# Patient Record
Sex: FEMALE | Race: BLACK OR AFRICAN AMERICAN | NOT HISPANIC OR LATINO | Employment: UNEMPLOYED | URBAN - METROPOLITAN AREA
[De-identification: names, ages, dates, MRNs, and addresses within clinical notes are randomized per-mention and may not be internally consistent; named-entity substitution may affect disease eponyms.]

---

## 2017-02-08 ENCOUNTER — HOSPITAL ENCOUNTER (EMERGENCY)
Facility: HOSPITAL | Age: 27
Discharge: HOME | End: 2017-02-08
Attending: EMERGENCY MEDICINE
Payer: MEDICAID

## 2017-02-08 VITALS
TEMPERATURE: 99 F | HEIGHT: 67 IN | OXYGEN SATURATION: 97 % | SYSTOLIC BLOOD PRESSURE: 141 MMHG | RESPIRATION RATE: 18 BRPM | WEIGHT: 220 LBS | HEART RATE: 64 BPM | DIASTOLIC BLOOD PRESSURE: 83 MMHG | BODY MASS INDEX: 34.53 KG/M2

## 2017-02-08 DIAGNOSIS — M23.8X1 DERANGEMENT OF RIGHT KNEE LIGAMENT: Primary | ICD-10-CM

## 2017-02-08 DIAGNOSIS — S89.90XA KNEE INJURY: ICD-10-CM

## 2017-02-08 DIAGNOSIS — M25.461 EFFUSION OF RIGHT KNEE: ICD-10-CM

## 2017-02-08 LAB
B-HCG UR QL: NEGATIVE
CTP QC/QA: YES

## 2017-02-08 PROCEDURE — 63600175 PHARM REV CODE 636 W HCPCS: Performed by: EMERGENCY MEDICINE

## 2017-02-08 PROCEDURE — 29505 APPLICATION LONG LEG SPLINT: CPT | Mod: RT

## 2017-02-08 PROCEDURE — 99284 EMERGENCY DEPT VISIT MOD MDM: CPT | Mod: 25

## 2017-02-08 PROCEDURE — 81025 URINE PREGNANCY TEST: CPT | Performed by: EMERGENCY MEDICINE

## 2017-02-08 PROCEDURE — 96372 THER/PROPH/DIAG INJ SC/IM: CPT

## 2017-02-08 RX ORDER — KETOROLAC TROMETHAMINE 30 MG/ML
30 INJECTION, SOLUTION INTRAMUSCULAR; INTRAVENOUS
Status: COMPLETED | OUTPATIENT
Start: 2017-02-08 | End: 2017-02-08

## 2017-02-08 RX ORDER — TRAMADOL HYDROCHLORIDE 50 MG/1
50 TABLET ORAL EVERY 6 HOURS PRN
Qty: 12 TABLET | Refills: 0 | Status: SHIPPED | OUTPATIENT
Start: 2017-02-08 | End: 2017-02-18

## 2017-02-08 RX ORDER — IBUPROFEN 600 MG/1
600 TABLET ORAL EVERY 6 HOURS PRN
Qty: 20 TABLET | Refills: 0 | Status: SHIPPED | OUTPATIENT
Start: 2017-02-08 | End: 2021-09-30

## 2017-02-08 RX ADMIN — KETOROLAC TROMETHAMINE 30 MG: 30 INJECTION, SOLUTION INTRAMUSCULAR at 10:02

## 2017-02-08 NOTE — ED AVS SNAPSHOT
[facility]  [address] [place] [address]               Ole Allen   2017  8:07 PM   ED    Description:  Female : 1990   Department:  [facility]           Your Care was Coordinated By:     Provider Role From To    Bonnie Riggs MD Attending Provider 17 --      Reason for Visit     Knee Pain           Diagnoses this Visit        Comments    Derangement of right knee ligament    -  Primary     Knee injury         Effusion of right knee           ED Disposition     ED Disposition Condition Comment    Discharge             To Do List           Follow-up Information     Follow up with Umesh He MD In 2 days.    Specialty:  Family Medicine    Why:  call to make an appointment to be seen for referral for orthopedic specialist.    Contact information:    [address]  [place] [address]  [phone]          Follow up with Jacky Welsh MD In 2 days.    Specialty:  Orthopedic Surgery    Why:  for further evaluation and definitive management    Contact information:    [address]  [address]  [place] [address]  [phone]         These Medications        Disp Refills Start End    ibuprofen (ADVIL,MOTRIN) 600 MG tablet 20 tablet 0 2017     Take 1 tablet (600 mg total) by mouth every 6 (six) hours as needed for Pain. - Oral    Pharmacy: [facility] - [place] [facility] Ph #: [phone]       tramadol (ULTRAM) 50 mg tablet 12 tablet 0 2017    Take 1 tablet (50 mg total) by mouth every 6 (six) hours as needed. - Oral    Pharmacy: [facility] - [place] [facility] Ph #: [phone]         [facility] On Call     [facility] On Call Nurse Care Line -  Assistance  Registered nurses in the [facility] provide clinical advisement, health education, appointment booking, and other advisory services.  Call for  "this free service at [phone].             Medications           Message regarding Medications     Verify the changes and/or additions to your medication regime listed below are the same as discussed with your clinician today.  If any of these changes or additions are incorrect, please notify your healthcare provider.        START taking these NEW medications        Refills    ibuprofen (ADVIL,MOTRIN) 600 MG tablet 0    Sig: Take 1 tablet (600 mg total) by mouth every 6 (six) hours as needed for Pain.    Class: Print    Route: Oral    tramadol (ULTRAM) 50 mg tablet 0    Sig: Take 1 tablet (50 mg total) by mouth every 6 (six) hours as needed.    Class: Print    Route: Oral      These medications were administered today        Dose Freq    ketorolac injection 30 mg 30 mg ED 1 Time    Sig: Inject 30 mg into the muscle ED 1 Time.    Class: Normal    Route: Intramuscular           Verify that the below list of medications is an accurate representation of the medications you are currently taking.  If none reported, the list may be blank. If incorrect, please contact your healthcare provider. Carry this list with you in case of emergency.           Current Medications     LEVONORGESTREL-ETHIN ESTRADIOL (LEVORA 0.15/30, 28, ORAL) Take by mouth.    albuterol sulfate 2.5 mg/0.5 mL Nebu Take 2.5 mg by nebulization every 4 (four) hours as needed.    ibuprofen (ADVIL,MOTRIN) 600 MG tablet Take 1 tablet (600 mg total) by mouth every 6 (six) hours as needed for Pain.    ketorolac injection 30 mg Inject 30 mg into the muscle ED 1 Time.    tramadol (ULTRAM) 50 mg tablet Take 1 tablet (50 mg total) by mouth every 6 (six) hours as needed.           Clinical Reference Information           Your Vitals Were     BP Pulse Temp Resp Height Weight    141/83 64 98.9 °F (37.2 °C) 18 5' 7" (1.702 m) 99.8 kg (220 lb)    SpO2 BMI             97% 34.46 kg/m2         Allergies as of 2/8/2017     No Known Allergies      Immunizations " Administered on Date of Encounter - 2/8/2017     None      ED Micro, Lab, POCT     Start Ordered       Status Ordering Provider    02/08/17 2053 02/08/17 2052  POCT urine pregnancy  Once      Final result       ED Imaging Orders     Start Ordered       Status Ordering Provider    02/08/17 2053 02/08/17 2052  X-Ray Knee 3 View Right  1 time imaging      Final result     02/08/17 2053 02/08/17 2052  X-Ray Tibia Fibula 2 View Right  1 time imaging      Final result         Discharge Instructions       Follow-up with orthopedic specialist for further evaluation and definitive management.  You may require further evaluation with MRI to assess for possible ligament injury.  Keep knee immobilizer in place until seen and cleared by orthopedic specialist.    Discharge References/Attachments     KNEE SPRAIN (ENGLISH)    KNEE LIGAMENT INJURY, ACL AND PCL (ENGLISH)    KNEE EFFUSION (ENGLISH)      [facility] Sign-Up     Activating your [facility] account is as easy as 1-2-3!     1) Visit [email or web address], select Sign Up Now, enter this activation code and your date of birth, then select Next.  Activation code not generated  Current Patient Portal Status: Account disabled      2) Create a username and password to use when you visit [facility] in the future and select a security question in case you lose your password and select Next.    3) Enter your e-mail address and click Sign Up!    Additional Information  If you have questions, please e-mail [email or web address] or call [phone] to talk to our [facility] staff. Remember, [facility] is NOT to be used for urgent needs. For medical emergencies, dial 911.          [facility] complies with applicable Federal civil rights laws and does not discriminate on the basis of race, color, national origin, age, disability, or sex.        Language Assistance Services     ATTENTION: Language assistance services are available, free of charge. Please call [phone].       ATENCIÓN: Si habla español, tiene a [place] disposición servicios gratuitos de asistencia lingüística. Llame al [phone].     CHÚ Ý: N?u b?n nói Ti?ng Vi?t, có các d?ch v? h? tr? ngôn ng? mi?n phí dành cho b?n. G?i s? [phone].

## 2017-02-09 NOTE — DISCHARGE INSTRUCTIONS
Follow-up with orthopedic specialist for further evaluation and definitive management.  You may require further evaluation with MRI to assess for possible ligament injury.  Keep knee immobilizer in place until seen and cleared by orthopedic specialist.

## 2017-02-09 NOTE — ED TRIAGE NOTES
Pt complains of Right knee pain.  Pt injured knee during a basketball layup when she came down on the right leg and heard a pop, felt a tear and pain along out knee area.  Pt also complains of pain all around the knee. Pt has taken ibuprophen last night for pain but has not taken anything today.   R knee is swollen and warm to touch.

## 2017-02-09 NOTE — ED PROVIDER NOTES
"Encounter Date: 2017    SCRIBE #1 NOTE: I, Kiki Pandya, am scribing for, and in the presence of,  Bonnie Riggs MD. I have scribed the following portions of the note - Other sections scribed: HPI, ROS.       History     Chief Complaint   Patient presents with    Knee Pain     " I was playing basket ball and I fell, landing on my right knee on Friday. I tried to see if it would get better on its own but it hasnt. It is swollen and hard to put pressure on."     Review of patient's allergies indicates:  No Known Allergies  HPI Comments: CC: Knee Pain    HPI: 27 year old female with asthma and herpes presents to the ED c/o acute, moderate (6/10) right knee pain with associated swelling. Patient reports playing basketball 6 days ago and heard a pop in her right knee when landing after a layup. Patient states the knee pain is exacerbated with movement, and is unable to bear weight on the right leg. No reported prior treatment. Patient denies fever, nausea, and other symptoms.     The history is provided by the patient. No  was used.     Past Medical History   Diagnosis Date    Asthma     Herpes      No past medical history pertinent negatives.  Past Surgical History   Procedure Laterality Date     section      Anterior cruciate ligament repair       History reviewed. No pertinent family history.  Social History   Substance Use Topics    Smoking status: Never Smoker    Smokeless tobacco: Never Used    Alcohol use Yes      Comment: occ     Review of Systems   Constitutional: Negative for fever.   HENT: Negative for rhinorrhea.    Eyes: Negative for pain.   Respiratory: Negative for shortness of breath.    Cardiovascular: Negative for chest pain.   Gastrointestinal: Negative for diarrhea, nausea and vomiting.   Genitourinary: Negative for dysuria.   Musculoskeletal:        (+) Right knee pain  (+) Right knee swelling   Skin: Negative for rash.   Neurological: Negative " for headaches.       Physical Exam   Initial Vitals   BP Pulse Resp Temp SpO2   02/08/17 1747 02/08/17 1747 02/08/17 1747 02/08/17 1747 02/08/17 1747   117/64 62 18 98.9 °F (37.2 °C) 95 %     Physical Exam    Nursing note and vitals reviewed.  Constitutional: She appears well-developed and well-nourished.   HENT:   Head: Normocephalic and atraumatic.   Eyes: Conjunctivae and EOM are normal.   Neck: Neck supple.   Cardiovascular: Regular rhythm and normal heart sounds. Exam reveals no gallop and no friction rub.    No murmur heard.  Pulmonary/Chest: Breath sounds normal. No respiratory distress. She has no wheezes. She has no rhonchi. She has no rales.   Abdominal: Soft. Bowel sounds are normal. There is no tenderness. There is no rebound and no guarding.   Musculoskeletal: Normal range of motion. She exhibits tenderness.   Pain with palpation to right lateral knee and right proximal anterior tibia. No obvious ligamentous laxity. 2+ DP pulse. No long bone deformity.    Neurological: She is alert and oriented to person, place, and time. No cranial nerve deficit.   Skin: No rash noted.         ED Course   Procedures  Labs Reviewed   POCT URINE PREGNANCY             Medical Decision Making:   History:   Old Medical Records: I decided to obtain old medical records.  27 y.o. female presents with right knee pain, after injuring it while playing basketball approximately 6 days ago.  She reports hearing a pop when landing on the affected leg, after doing a layup while playing basketball.  Differential diagnosis includes but not limited to acute fracture, ligament injury, sprain, septic arthritis, among others.  On exam patient has full range of motion of right knee.  She does have point tenderness to right lateral knee, and right proximal anterior tib-fib.  Right knee x-rays independently interpreted by me show no evidence of acute fracture however there is lateral displacement of patella and evidence of knee effusion.   Doubt septic joint.  I'm concerned for ligament injury.  Patient placed in knee immobilizer and I will refer her to orthopedic specialist.  I discussed at length with the patient the importance of close follow-up up with orthopedic specialist for MRI.  She states understanding discharge plan and is comfortable going home.            Scribe Attestation:   Scribe #1: I performed the above scribed service and the documentation accurately describes the services I performed. I attest to the accuracy of the note.    Attending Attestation:           Physician Attestation for Scribe:  Physician Attestation Statement for Scribe #1: I, Bonnie Riggs MD, reviewed documentation, as scribed by Kiki Pandya in my presence, and it is both accurate and complete.                 ED Course     Clinical Impression:   The primary encounter diagnosis was Derangement of right knee ligament. Diagnoses of Knee injury and Effusion of right knee were also pertinent to this visit.          Bonnie Riggs MD  02/08/17 1809

## 2017-11-28 ENCOUNTER — HOSPITAL ENCOUNTER (EMERGENCY)
Facility: HOSPITAL | Age: 27
Discharge: HOME | End: 2017-11-29
Attending: EMERGENCY MEDICINE
Payer: MEDICAID

## 2017-11-28 DIAGNOSIS — Z33.1 PREGNANCY AS INCIDENTAL FINDING: ICD-10-CM

## 2017-11-28 DIAGNOSIS — R11.2 NAUSEA AND VOMITING, INTRACTABILITY OF VOMITING NOT SPECIFIED, UNSPECIFIED VOMITING TYPE: ICD-10-CM

## 2017-11-28 DIAGNOSIS — N76.0 BV (BACTERIAL VAGINOSIS): ICD-10-CM

## 2017-11-28 DIAGNOSIS — B96.89 BV (BACTERIAL VAGINOSIS): ICD-10-CM

## 2017-11-28 DIAGNOSIS — O30.041 DICHORIONIC DIAMNIOTIC TWIN PREGNANCY IN FIRST TRIMESTER: Primary | ICD-10-CM

## 2017-11-28 LAB
ABO + RH BLD: NORMAL
ALBUMIN SERPL BCP-MCNC: 3.8 G/DL
ALP SERPL-CCNC: 71 U/L
ALT SERPL W/O P-5'-P-CCNC: 26 U/L
ANION GAP SERPL CALC-SCNC: 11 MMOL/L
AST SERPL-CCNC: 23 U/L
B-HCG UR QL: POSITIVE
BACTERIA GENITAL QL WET PREP: ABNORMAL
BASOPHILS # BLD AUTO: 0.02 K/UL
BASOPHILS NFR BLD: 0.3 %
BILIRUB SERPL-MCNC: 0.9 MG/DL
BILIRUB UR QL STRIP: NEGATIVE
BUN SERPL-MCNC: 10 MG/DL
CALCIUM SERPL-MCNC: 9.9 MG/DL
CHLORIDE SERPL-SCNC: 106 MMOL/L
CLARITY UR: CLEAR
CLUE CELLS VAG QL WET PREP: ABNORMAL
CO2 SERPL-SCNC: 20 MMOL/L
COLOR UR: YELLOW
CREAT SERPL-MCNC: 0.8 MG/DL
CTP QC/QA: YES
DIFFERENTIAL METHOD: ABNORMAL
EOSINOPHIL # BLD AUTO: 0 K/UL
EOSINOPHIL NFR BLD: 0.1 %
ERYTHROCYTE [DISTWIDTH] IN BLOOD BY AUTOMATED COUNT: 13.5 %
EST. GFR  (AFRICAN AMERICAN): >60 ML/MIN/1.73 M^2
EST. GFR  (NON AFRICAN AMERICAN): >60 ML/MIN/1.73 M^2
FILAMENT FUNGI VAG WET PREP-#/AREA: ABNORMAL
GLUCOSE SERPL-MCNC: 111 MG/DL
GLUCOSE UR QL STRIP: NEGATIVE
HCG INTACT+B SERPL-ACNC: NORMAL MIU/ML
HCT VFR BLD AUTO: 34.4 %
HGB BLD-MCNC: 12 G/DL
HGB UR QL STRIP: NEGATIVE
KETONES UR QL STRIP: ABNORMAL
LEUKOCYTE ESTERASE UR QL STRIP: NEGATIVE
LIPASE SERPL-CCNC: 4 U/L
LYMPHOCYTES # BLD AUTO: 1 K/UL
LYMPHOCYTES NFR BLD: 14.4 %
MCH RBC QN AUTO: 31 PG
MCHC RBC AUTO-ENTMCNC: 34.9 G/DL
MCV RBC AUTO: 89 FL
MONOCYTES # BLD AUTO: 0.1 K/UL
MONOCYTES NFR BLD: 1.5 %
NEUTROPHILS # BLD AUTO: 5.7 K/UL
NEUTROPHILS NFR BLD: 83.7 %
NITRITE UR QL STRIP: NEGATIVE
PH UR STRIP: 7 [PH] (ref 5–8)
PLATELET # BLD AUTO: 232 K/UL
PMV BLD AUTO: 10.7 FL
POTASSIUM SERPL-SCNC: 3.6 MMOL/L
PROT SERPL-MCNC: 7.9 G/DL
PROT UR QL STRIP: NEGATIVE
RBC # BLD AUTO: 3.87 M/UL
SODIUM SERPL-SCNC: 137 MMOL/L
SP GR UR STRIP: 1.02 (ref 1–1.03)
SPECIMEN SOURCE: ABNORMAL
T VAGINALIS GENITAL QL WET PREP: ABNORMAL
URN SPEC COLLECT METH UR: ABNORMAL
UROBILINOGEN UR STRIP-ACNC: NEGATIVE EU/DL
WBC # BLD AUTO: 6.75 K/UL
WBC #/AREA VAG WET PREP: ABNORMAL
YEAST GENITAL QL WET PREP: ABNORMAL

## 2017-11-28 PROCEDURE — 86901 BLOOD TYPING SEROLOGIC RH(D): CPT | Mod: 91

## 2017-11-28 PROCEDURE — 84702 CHORIONIC GONADOTROPIN TEST: CPT

## 2017-11-28 PROCEDURE — 87210 SMEAR WET MOUNT SALINE/INK: CPT

## 2017-11-28 PROCEDURE — 83690 ASSAY OF LIPASE: CPT

## 2017-11-28 PROCEDURE — 63600175 PHARM REV CODE 636 W HCPCS: Performed by: NURSE PRACTITIONER

## 2017-11-28 PROCEDURE — 80053 COMPREHEN METABOLIC PANEL: CPT

## 2017-11-28 PROCEDURE — 81025 URINE PREGNANCY TEST: CPT | Performed by: EMERGENCY MEDICINE

## 2017-11-28 PROCEDURE — 96374 THER/PROPH/DIAG INJ IV PUSH: CPT

## 2017-11-28 PROCEDURE — 81003 URINALYSIS AUTO W/O SCOPE: CPT

## 2017-11-28 PROCEDURE — 86900 BLOOD TYPING SEROLOGIC ABO: CPT

## 2017-11-28 PROCEDURE — 85025 COMPLETE CBC W/AUTO DIFF WBC: CPT

## 2017-11-28 PROCEDURE — 99284 EMERGENCY DEPT VISIT MOD MDM: CPT | Mod: 25

## 2017-11-28 PROCEDURE — 25000003 PHARM REV CODE 250: Performed by: NURSE PRACTITIONER

## 2017-11-28 PROCEDURE — 87591 N.GONORRHOEAE DNA AMP PROB: CPT

## 2017-11-28 PROCEDURE — 86901 BLOOD TYPING SEROLOGIC RH(D): CPT

## 2017-11-28 PROCEDURE — 96361 HYDRATE IV INFUSION ADD-ON: CPT

## 2017-11-28 RX ORDER — ONDANSETRON 2 MG/ML
4 INJECTION INTRAMUSCULAR; INTRAVENOUS
Status: COMPLETED | OUTPATIENT
Start: 2017-11-28 | End: 2017-11-28

## 2017-11-28 RX ORDER — ONDANSETRON 2 MG/ML
4 INJECTION INTRAMUSCULAR; INTRAVENOUS
Status: DISCONTINUED | OUTPATIENT
Start: 2017-11-28 | End: 2017-11-28

## 2017-11-28 RX ORDER — ONDANSETRON 4 MG/1
4 TABLET, FILM COATED ORAL EVERY 6 HOURS PRN
Qty: 10 TABLET | Refills: 0 | Status: SHIPPED | OUTPATIENT
Start: 2017-11-28 | End: 2022-10-24

## 2017-11-28 RX ORDER — ONDANSETRON 4 MG/1
4 TABLET, ORALLY DISINTEGRATING ORAL
Status: COMPLETED | OUTPATIENT
Start: 2017-11-28 | End: 2017-11-28

## 2017-11-28 RX ADMIN — ONDANSETRON 4 MG: 2 INJECTION INTRAMUSCULAR; INTRAVENOUS at 09:11

## 2017-11-28 RX ADMIN — SODIUM CHLORIDE 1000 ML: 0.9 INJECTION, SOLUTION INTRAVENOUS at 08:11

## 2017-11-28 RX ADMIN — ONDANSETRON 4 MG: 4 TABLET, ORALLY DISINTEGRATING ORAL at 08:11

## 2017-11-29 VITALS
HEART RATE: 60 BPM | OXYGEN SATURATION: 95 % | WEIGHT: 205 LBS | SYSTOLIC BLOOD PRESSURE: 140 MMHG | DIASTOLIC BLOOD PRESSURE: 76 MMHG | TEMPERATURE: 98 F | RESPIRATION RATE: 18 BRPM | BODY MASS INDEX: 32.18 KG/M2 | HEIGHT: 67 IN

## 2017-11-29 LAB
C TRACH DNA SPEC QL NAA+PROBE: NOT DETECTED
N GONORRHOEA DNA SPEC QL NAA+PROBE: NOT DETECTED
RH BLD: NORMAL

## 2017-11-29 NOTE — DISCHARGE INSTRUCTIONS
You are 6 weeks 5 days pregnant with twins.  Estimated delivery date 7/19/2018.    You have been prescribed prenatal vitamins.  Take these as prescribed. You have also been prescribed Zofran, and anti-nausea medication.  Take this medication as prescribed as needed for nausea.  Eat a bland diet and drink plenty of fluids.    Call your OB/GYN tomorrow morning to schedule an appointment immediately to establish care.  Your blood type is O negative. This will be important later on in your pregnancy.  You also have bacterial vaginosis. We will defer this treatment to the OB-GYN doctor. Notify your OB-GYN on your first visit.    Please return to the Emergency Department for any new or worsening symptoms including: Worsening abdominal pain, continued vomiting, vaginal bleeding or pelvic pain, fever, chest pain, shortness of breath, loss of consciousness, dizziness, weakness, or any other concerns.     Please follow up with your Primary Care Provider within in the week. If you do not have one, you may contact the one listed on your discharge paperwork or you may also call the [facility] Appointment Desk at [phone] to schedule an appointment with one.     Please take all medication as prescribed.

## 2017-11-29 NOTE — ED PROVIDER NOTES
Encounter Date: 2017       History     Chief Complaint   Patient presents with    Abdominal Pain     Vomiting since this morning x 10.  Chills and abdominal pain.     CC: Abdominal pain    HPI: The patient is a 27-year-old female who presents today with generalized abdominal pain, nausea, vomiting since this morning.  She describes the pain as cramping, it is constant, rated 9 out of 10 on pain scale.  She reports vomiting many times today and is currently nauseated.  She attempted to take Pepto-Bismol for this problem, but she vomited it up. Her last menstrual cycle was reported as earlier this month.  UPT today here in the ED is positive.  She does report having unprotected sex.  She denies fever, chills, shortness of breath, chest pain, diarrhea, vaginal bleeding, vaginal discharge, dysuria, urinary urgency or frequency.       The history is provided by the patient. No  was used.     Review of patient's allergies indicates:  No Known Allergies  Past Medical History:   Diagnosis Date    Asthma     Herpes      Past Surgical History:   Procedure Laterality Date    ANTERIOR CRUCIATE LIGAMENT REPAIR       SECTION       History reviewed. No pertinent family history.  Social History   Substance Use Topics    Smoking status: Never Smoker    Smokeless tobacco: Never Used    Alcohol use Yes      Comment: occ     Review of Systems   Constitutional: Negative for chills and fever.   HENT: Negative for sore throat.    Respiratory: Negative for cough, chest tightness and shortness of breath.    Cardiovascular: Negative for chest pain.   Gastrointestinal: Positive for abdominal pain, nausea and vomiting. Negative for constipation and diarrhea.   Genitourinary: Negative for decreased urine volume, difficulty urinating, dysuria, vaginal bleeding, vaginal discharge and vaginal pain.   Musculoskeletal: Negative for back pain.   Skin: Negative for rash.   Neurological: Negative for dizziness,  weakness, light-headedness and headaches.   Hematological: Does not bruise/bleed easily.       Physical Exam     Initial Vitals [11/28/17 1725]   BP Pulse Resp Temp SpO2   (!) 154/79 63 18 97.6 °F (36.4 °C) 95 %      MAP       104         Physical Exam    Constitutional: She appears well-developed and well-nourished. She is not diaphoretic. She is Obese . She has a sickly appearance. No distress.   HENT:   Head: Normocephalic and atraumatic.   Neck: Normal range of motion.   Cardiovascular: Normal rate, regular rhythm and normal heart sounds. Exam reveals no gallop and no friction rub.    No murmur heard.  Pulmonary/Chest: Breath sounds normal. No respiratory distress. She has no wheezes. She has no rhonchi. She has no rales.   Abdominal: Soft. Bowel sounds are normal. She exhibits no distension and no mass. There is no hepatosplenomegaly. There is no tenderness. There is no rigidity, no rebound, no guarding, no CVA tenderness, no tenderness at McBurney's point and negative Suazo's sign.   Genitourinary: Vagina normal. Pelvic exam was performed with patient supine. There is no rash, tenderness, lesion or injury on the right labia. There is no rash, tenderness, lesion or injury on the left labia. Cervix exhibits no motion tenderness, no discharge (small amount mucoid clear/white drainage) and no friability. Right adnexum displays no mass, no tenderness and no fullness. Left adnexum displays no mass, no tenderness and no fullness. No erythema, tenderness or bleeding in the vagina. No foreign body in the vagina. No signs of injury around the vagina. No vaginal discharge found.   Musculoskeletal: Normal range of motion.   Neurological: She is alert and oriented to person, place, and time.   Skin: Skin is warm and dry.   Psychiatric: She has a normal mood and affect. Her behavior is normal.         ED Course   Procedures  Labs Reviewed   CBC W/ AUTO DIFFERENTIAL - Abnormal; Notable for the following:        Result Value     RBC 3.87 (*)     Hematocrit 34.4 (*)     Mono # 0.1 (*)     Gran% 83.7 (*)     Lymph% 14.4 (*)     Mono% 1.5 (*)     All other components within normal limits    Narrative:     For upper or mid abdominal pain.   COMPREHENSIVE METABOLIC PANEL - Abnormal; Notable for the following:     CO2 20 (*)     Glucose 111 (*)     All other components within normal limits    Narrative:     For upper or mid abdominal pain.   URINALYSIS - Abnormal; Notable for the following:     Ketones, UA 1+ (*)     All other components within normal limits   POCT URINE PREGNANCY - Abnormal; Notable for the following:     POC Preg Test, Ur Positive (*)     All other components within normal limits   C. TRACHOMATIS/N. GONORRHOEAE BY AMP DNA   LIPASE    Narrative:     For upper or mid abdominal pain.   HCG, QUANTITATIVE, PREGNANCY   VAGINAL SCREEN   GROUP & RH             Medical Decision Making:   ED Management:  This is an evaluation of a 27 y.o. female that that presents to the Emergency Department for Abdominal Pain, nausea and vomiting. UPT was positive on arrival to the ED.  Last menstrual period earlier this month.  She was unaware she was pregnant. She has not received prenatal care. ROS positive for: Generalized abdominal cramping pain, nausea, vomiting; ROS negative for chills, diarrhea, shortness breath, chest pain, vaginal bleeding, vaginal discharge, dysuria. The patient is a non-toxic, afebrile, and well appearing female. On examination, the abdomen is flat without distention.  There is no surface trauma, scars, incisions.  Bowel sounds are present in all 4 quadrants.  No tenderness, guarding, rigidity to palpation.  No tenderness, masses, pulsation in epigastric area.  No organomegaly.  Negative Suazo sign.  No periumbilical tenderness.  No rebound in the lower quadrants.  Nontender over McBurney's point.  No suprapubic tenderness or distention.  Good femoral pulses bilaterally.  No CVA tenderness. pelvic examination revealed a  closed cervical os with small amount of white mucoid drainage, no CMT, no adnexal fullness or tenderness with palpation. She has no bloody vaginal discharge.     Vital Signs are reassuring.    RESULTS:   The chemistry was negative for hypo-or hyper natremia, kalemia, chloridemia, or other electrolyte abnormalities; BUN and creatinine were within normal limits indicating normal kidney function, ALT and AST were within normal limits indicating normal liver function, there was no transaminitis. Lipase within normal limits indicating normal pancreatic function.  CBC was negativefor leukocytosis  indicating unlikely systemic infection, the H&H was stable and was within normal limits and indicating absence of anemia. The platelet count was normal indicating the absence of a tendency toward bleeding.  UA is negative for infection, no nitrites, leukocytes, blood, or protein present.  There were clue cells and moderate amount of bacteria present on vaginal screen. She reports history of BV. She is currently asymptomatic at this time.  I will defer to OB/GYN.      A transvaginal ultrasound was obtained showing a dichorionic diamniotic intrauterine gestation.  Baby A corresponding to 6 weeks and 5 days' gestation.  Fetal heart rate 124 bpm.  Baby B corresponding to 6 weeks and 3 days gestation.  Fetal heart rate of 128 bpm.  Beta HCG 88781, correlates with transvaginal US findings.  Her blood type is O Negative.  She is not exhibiting any vaginal bleeding or signs of miscarriage at this time.  Therefore I do not feel RhoGAM administration appropriate at this time.  This was discussed with the patient.    Given the above findings, my overall impression is pregnancy, nausea, vomiting, BV. Given the above findings, I do not think the patient has ectopic pregnancy, ovarian torsion, tubo-ovarian abscess, bowel obstruction, appendicitis, cholecystitis, gastroenteritis, diverticulitis, colitis, UTI.    During her stay in the ED, the  patient has been given IVF and zofran with good relief of her symptoms. The patient will be discharged home with short-term course of zofran and prenatal vitamins. Additional home care recommendations include bland diet. The diagnosis, treatment plan, instructions for follow-up and reevaluation with her discharging her with follow up to her OB/GYN for further evaluation of her symptoms. She can take tylenol for pain. ED return precautions have been discussed with the patient and she has verbalized an understanding of the information. All questions or concerns from the patient have been addressed.     This case was discussed with Dr. Ulloa who is in agreement with my assessment and plan.                   ED Course      Clinical Impression:   The primary encounter diagnosis was Dichorionic diamniotic twin pregnancy in first trimester. Diagnoses of Pregnancy as incidental finding, Nausea and vomiting, intractability of vomiting not specified, unspecified vomiting type, and BV (bacterial vaginosis) were also pertinent to this visit.    Disposition:   Disposition: Discharged  Condition: Stable                        Joann Hauser NP  11/29/17 0019

## 2017-11-29 NOTE — ED TRIAGE NOTES
Pt presents to ED c/o abdominal pain, nausea/vomiting, chills and generalized body aches that started this morning.

## 2017-12-14 PROBLEM — Z98.891 H/O CESAREAN SECTION: Status: ACTIVE | Noted: 2017-12-14

## 2017-12-14 PROBLEM — A60.9 HSV (HERPES SIMPLEX VIRUS) ANOGENITAL INFECTION: Status: ACTIVE | Noted: 2017-12-14

## 2017-12-14 PROBLEM — J45.909 ASTHMA: Status: ACTIVE | Noted: 2017-12-14

## 2017-12-18 PROBLEM — D57.3 SICKLE CELL TRAIT: Status: ACTIVE | Noted: 2017-12-18

## 2021-05-10 ENCOUNTER — LAB VISIT (OUTPATIENT)
Dept: LAB | Facility: HOSPITAL | Age: 31
End: 2021-05-10
Attending: OBSTETRICS & GYNECOLOGY
Payer: MEDICAID

## 2021-05-10 ENCOUNTER — OFFICE VISIT (OUTPATIENT)
Dept: OBSTETRICS AND GYNECOLOGY | Facility: CLINIC | Age: 31
End: 2021-05-10
Payer: MEDICAID

## 2021-05-10 VITALS
BODY MASS INDEX: 36.43 KG/M2 | WEIGHT: 232.56 LBS | SYSTOLIC BLOOD PRESSURE: 114 MMHG | DIASTOLIC BLOOD PRESSURE: 74 MMHG

## 2021-05-10 DIAGNOSIS — O34.219 HISTORY OF CESAREAN DELIVERY AFFECTING PREGNANCY: ICD-10-CM

## 2021-05-10 DIAGNOSIS — O09.299 HISTORY OF PRE-ECLAMPSIA IN PRIOR PREGNANCY, CURRENTLY PREGNANT: Primary | ICD-10-CM

## 2021-05-10 DIAGNOSIS — O09.299 HISTORY OF PRE-ECLAMPSIA IN PRIOR PREGNANCY, CURRENTLY PREGNANT: ICD-10-CM

## 2021-05-10 DIAGNOSIS — Z12.4 ENCOUNTER FOR PAPANICOLAOU SMEAR FOR CERVICAL CANCER SCREENING: ICD-10-CM

## 2021-05-10 DIAGNOSIS — Z11.3 SCREEN FOR STD (SEXUALLY TRANSMITTED DISEASE): ICD-10-CM

## 2021-05-10 LAB
ABO + RH BLD: NORMAL
BILIRUB UR QL STRIP: NEGATIVE
BLD GP AB SCN CELLS X3 SERPL QL: NORMAL
CLARITY UR: CLEAR
COLOR UR: YELLOW
ERYTHROCYTE [DISTWIDTH] IN BLOOD BY AUTOMATED COUNT: 14.5 % (ref 11.5–14.5)
GLUCOSE UR QL STRIP: NEGATIVE
HCT VFR BLD AUTO: 29.8 % (ref 37–48.5)
HGB BLD-MCNC: 10.2 G/DL (ref 12–16)
HGB UR QL STRIP: NEGATIVE
KETONES UR QL STRIP: NEGATIVE
LEUKOCYTE ESTERASE UR QL STRIP: NEGATIVE
MCH RBC QN AUTO: 28.8 PG (ref 27–31)
MCHC RBC AUTO-ENTMCNC: 34.2 G/DL (ref 32–36)
MCV RBC AUTO: 84 FL (ref 82–98)
NITRITE UR QL STRIP: NEGATIVE
PH UR STRIP: 7 [PH] (ref 5–8)
PLATELET # BLD AUTO: 271 K/UL (ref 150–450)
PMV BLD AUTO: 10.8 FL (ref 9.2–12.9)
PROT UR QL STRIP: NEGATIVE
RBC # BLD AUTO: 3.54 M/UL (ref 4–5.4)
SP GR UR STRIP: 1.01 (ref 1–1.03)
URN SPEC COLLECT METH UR: NORMAL
UROBILINOGEN UR STRIP-ACNC: NEGATIVE EU/DL
WBC # BLD AUTO: 5.13 K/UL (ref 3.9–12.7)

## 2021-05-10 PROCEDURE — 85027 COMPLETE CBC AUTOMATED: CPT | Performed by: OBSTETRICS & GYNECOLOGY

## 2021-05-10 PROCEDURE — 83021 HEMOGLOBIN CHROMOTOGRAPHY: CPT | Performed by: OBSTETRICS & GYNECOLOGY

## 2021-05-10 PROCEDURE — 81003 URINALYSIS AUTO W/O SCOPE: CPT | Performed by: OBSTETRICS & GYNECOLOGY

## 2021-05-10 PROCEDURE — 87491 CHLMYD TRACH DNA AMP PROBE: CPT | Mod: 59 | Performed by: OBSTETRICS & GYNECOLOGY

## 2021-05-10 PROCEDURE — 87624 HPV HI-RISK TYP POOLED RSLT: CPT | Performed by: OBSTETRICS & GYNECOLOGY

## 2021-05-10 PROCEDURE — 80307 DRUG TEST PRSMV CHEM ANLYZR: CPT | Performed by: OBSTETRICS & GYNECOLOGY

## 2021-05-10 PROCEDURE — 87481 CANDIDA DNA AMP PROBE: CPT | Mod: 59 | Performed by: OBSTETRICS & GYNECOLOGY

## 2021-05-10 PROCEDURE — 99203 OFFICE O/P NEW LOW 30 MIN: CPT | Mod: PBBFAC,TH,25 | Performed by: OBSTETRICS & GYNECOLOGY

## 2021-05-10 PROCEDURE — 99999 PR PBB SHADOW E&M-NEW PATIENT-LVL III: ICD-10-PCS | Mod: PBBFAC,,, | Performed by: OBSTETRICS & GYNECOLOGY

## 2021-05-10 PROCEDURE — 99204 PR OFFICE/OUTPT VISIT, NEW, LEVL IV, 45-59 MIN: ICD-10-PCS | Mod: S$PBB,TH,, | Performed by: OBSTETRICS & GYNECOLOGY

## 2021-05-10 PROCEDURE — 86762 RUBELLA ANTIBODY: CPT | Performed by: OBSTETRICS & GYNECOLOGY

## 2021-05-10 PROCEDURE — 80074 ACUTE HEPATITIS PANEL: CPT | Performed by: OBSTETRICS & GYNECOLOGY

## 2021-05-10 PROCEDURE — 86703 HIV-1/HIV-2 1 RESULT ANTBDY: CPT | Performed by: OBSTETRICS & GYNECOLOGY

## 2021-05-10 PROCEDURE — 99204 OFFICE O/P NEW MOD 45 MIN: CPT | Mod: S$PBB,TH,, | Performed by: OBSTETRICS & GYNECOLOGY

## 2021-05-10 PROCEDURE — 87591 N.GONORRHOEAE DNA AMP PROB: CPT | Mod: 59 | Performed by: OBSTETRICS & GYNECOLOGY

## 2021-05-10 PROCEDURE — 85660 RBC SICKLE CELL TEST: CPT | Performed by: OBSTETRICS & GYNECOLOGY

## 2021-05-10 PROCEDURE — 99999 PR PBB SHADOW E&M-NEW PATIENT-LVL III: CPT | Mod: PBBFAC,,, | Performed by: OBSTETRICS & GYNECOLOGY

## 2021-05-10 PROCEDURE — 86900 BLOOD TYPING SEROLOGIC ABO: CPT | Performed by: OBSTETRICS & GYNECOLOGY

## 2021-05-10 PROCEDURE — 88175 CYTOPATH C/V AUTO FLUID REDO: CPT | Performed by: OBSTETRICS & GYNECOLOGY

## 2021-05-10 PROCEDURE — 87086 URINE CULTURE/COLONY COUNT: CPT | Performed by: OBSTETRICS & GYNECOLOGY

## 2021-05-10 RX ORDER — ASPIRIN 81 MG/1
81 TABLET ORAL DAILY
Qty: 150 TABLET | Refills: 2 | Status: SHIPPED | OUTPATIENT
Start: 2021-05-10 | End: 2022-10-18 | Stop reason: SDUPTHER

## 2021-05-11 LAB
AMPHET+METHAMPHET UR QL: NEGATIVE
BARBITURATES UR QL SCN>200 NG/ML: NEGATIVE
BENZODIAZ UR QL SCN>200 NG/ML: NEGATIVE
BZE UR QL SCN: NEGATIVE
CANNABINOIDS UR QL SCN: NORMAL
CREAT UR-MCNC: 69 MG/DL (ref 15–325)
ETHANOL UR-MCNC: <10 MG/DL
HAV IGM SERPL QL IA: NEGATIVE
HBV CORE IGM SERPL QL IA: NEGATIVE
HBV SURFACE AG SERPL QL IA: NEGATIVE
HCV AB SERPL QL IA: NEGATIVE
HIV 1+2 AB+HIV1 P24 AG SERPL QL IA: NEGATIVE
METHADONE UR QL SCN>300 NG/ML: NEGATIVE
OPIATES UR QL SCN: NEGATIVE
PCP UR QL SCN>25 NG/ML: NEGATIVE
RUBV IGG SER-ACNC: 21.4 IU/ML
RUBV IGG SER-IMP: REACTIVE
TOXICOLOGY INFORMATION: NORMAL

## 2021-05-12 ENCOUNTER — TELEPHONE (OUTPATIENT)
Dept: OBSTETRICS AND GYNECOLOGY | Facility: CLINIC | Age: 31
End: 2021-05-12

## 2021-05-12 ENCOUNTER — TELEPHONE (OUTPATIENT)
Dept: MATERNAL FETAL MEDICINE | Facility: CLINIC | Age: 31
End: 2021-05-12

## 2021-05-12 LAB
BACTERIA UR CULT: NORMAL
BACTERIAL VAGINOSIS DNA: NEGATIVE
C TRACH DNA SPEC QL NAA+PROBE: NOT DETECTED
CANDIDA GLABRATA DNA: NEGATIVE
CANDIDA KRUSEI DNA: NEGATIVE
CANDIDA RRNA VAG QL PROBE: POSITIVE
HB ELECTROPHORESIS INTERP CANCEL: ABNORMAL
HB ELECTROPHORESIS INTERPRETATION: ABNORMAL
HB ELECTROPHORESIS SUMMARY INTERPRETATION: NORMAL
HGB A MFR BLD ELPH: 58.1 % (ref 95.8–98)
HGB A2 MFR BLD: 2.9 % (ref 2–3.3)
HGB A2+XXX MFR BLD ELPH: ABNORMAL %
HGB F MFR BLD: 1.7 % (ref 0–0.9)
HGB S BLD QL: POSITIVE
HGB XXX MFR BLD ELPH: ABNORMAL %
HPLC HB VARIANT: ABNORMAL
N GONORRHOEA DNA SPEC QL NAA+PROBE: NOT DETECTED
PROVIDER SIGNING NAME: NORMAL
T VAGINALIS RRNA GENITAL QL PROBE: NEGATIVE

## 2021-05-14 LAB
HPV HR 12 DNA SPEC QL NAA+PROBE: NEGATIVE
HPV16 AG SPEC QL: NEGATIVE
HPV18 DNA SPEC QL NAA+PROBE: NEGATIVE

## 2021-05-16 LAB
FINAL PATHOLOGIC DIAGNOSIS: NORMAL
Lab: NORMAL

## 2021-05-17 ENCOUNTER — TELEPHONE (OUTPATIENT)
Dept: MATERNAL FETAL MEDICINE | Facility: CLINIC | Age: 31
End: 2021-05-17

## 2021-05-18 RX ORDER — DOCUSATE SODIUM 100 MG/1
100 CAPSULE, LIQUID FILLED ORAL 2 TIMES DAILY
Qty: 60 CAPSULE | Refills: 11 | OUTPATIENT
Start: 2021-05-18 | End: 2021-09-15

## 2021-05-18 RX ORDER — FERROUS SULFATE 325(65) MG
325 TABLET ORAL
Qty: 30 TABLET | Refills: 11 | Status: SHIPPED | OUTPATIENT
Start: 2021-05-18 | End: 2022-10-24

## 2021-06-01 ENCOUNTER — PROCEDURE VISIT (OUTPATIENT)
Dept: MATERNAL FETAL MEDICINE | Facility: CLINIC | Age: 31
End: 2021-06-01
Payer: MEDICAID

## 2021-06-01 DIAGNOSIS — O99.212 OBESITY AFFECTING PREGNANCY IN SECOND TRIMESTER: ICD-10-CM

## 2021-06-01 DIAGNOSIS — O34.219 HISTORY OF CESAREAN DELIVERY AFFECTING PREGNANCY: ICD-10-CM

## 2021-06-01 DIAGNOSIS — O09.299 HISTORY OF PRE-ECLAMPSIA IN PRIOR PREGNANCY, CURRENTLY PREGNANT: ICD-10-CM

## 2021-06-01 PROCEDURE — 76811 OB US DETAILED SNGL FETUS: CPT | Mod: PBBFAC,PO | Performed by: OBSTETRICS & GYNECOLOGY

## 2021-06-01 PROCEDURE — 76811 OB US DETAILED SNGL FETUS: CPT | Mod: 26,S$PBB,, | Performed by: OBSTETRICS & GYNECOLOGY

## 2021-06-01 PROCEDURE — 76811 PR US, OB FETAL EVAL & EXAM, TRANSABDOM,FIRST GESTATION: ICD-10-PCS | Mod: 26,S$PBB,, | Performed by: OBSTETRICS & GYNECOLOGY

## 2021-06-09 ENCOUNTER — LAB VISIT (OUTPATIENT)
Dept: LAB | Facility: HOSPITAL | Age: 31
End: 2021-06-09
Attending: OBSTETRICS & GYNECOLOGY
Payer: MEDICAID

## 2021-06-09 ENCOUNTER — INITIAL PRENATAL (OUTPATIENT)
Dept: OBSTETRICS AND GYNECOLOGY | Facility: CLINIC | Age: 31
End: 2021-06-09
Payer: MEDICAID

## 2021-06-09 VITALS — WEIGHT: 229.5 LBS | BODY MASS INDEX: 35.94 KG/M2

## 2021-06-09 DIAGNOSIS — Z67.91 RH NEGATIVE STATE IN ANTEPARTUM PERIOD: Primary | ICD-10-CM

## 2021-06-09 DIAGNOSIS — O09.299 HISTORY OF PRE-ECLAMPSIA IN PRIOR PREGNANCY, CURRENTLY PREGNANT: ICD-10-CM

## 2021-06-09 DIAGNOSIS — F12.11 MARIJUANA ABUSE IN REMISSION: ICD-10-CM

## 2021-06-09 DIAGNOSIS — O34.219 HISTORY OF CESAREAN DELIVERY AFFECTING PREGNANCY: ICD-10-CM

## 2021-06-09 DIAGNOSIS — Z3A.21 21 WEEKS GESTATION OF PREGNANCY: ICD-10-CM

## 2021-06-09 DIAGNOSIS — O98.319 GENITAL HERPES SIMPLEX VIRUS (HSV) INFECTION IN MOTHER AFFECTING PREGNANCY: ICD-10-CM

## 2021-06-09 DIAGNOSIS — D57.3 SICKLE CELL TRAIT: ICD-10-CM

## 2021-06-09 DIAGNOSIS — O26.899 RH NEGATIVE STATE IN ANTEPARTUM PERIOD: Primary | ICD-10-CM

## 2021-06-09 DIAGNOSIS — A60.09 GENITAL HERPES SIMPLEX VIRUS (HSV) INFECTION IN MOTHER AFFECTING PREGNANCY: ICD-10-CM

## 2021-06-09 LAB
ALBUMIN SERPL BCP-MCNC: 3.3 G/DL (ref 3.5–5.2)
ALP SERPL-CCNC: 73 U/L (ref 55–135)
ALT SERPL W/O P-5'-P-CCNC: 11 U/L (ref 10–44)
ANION GAP SERPL CALC-SCNC: 8 MMOL/L (ref 8–16)
AST SERPL-CCNC: 15 U/L (ref 10–40)
BILIRUB SERPL-MCNC: 0.5 MG/DL (ref 0.1–1)
BUN SERPL-MCNC: 6 MG/DL (ref 6–20)
CALCIUM SERPL-MCNC: 9.5 MG/DL (ref 8.7–10.5)
CHLORIDE SERPL-SCNC: 105 MMOL/L (ref 95–110)
CO2 SERPL-SCNC: 22 MMOL/L (ref 23–29)
CREAT SERPL-MCNC: 0.7 MG/DL (ref 0.5–1.4)
CREAT UR-MCNC: 204 MG/DL (ref 15–325)
EST. GFR  (AFRICAN AMERICAN): >60 ML/MIN/1.73 M^2
EST. GFR  (NON AFRICAN AMERICAN): >60 ML/MIN/1.73 M^2
GLUCOSE SERPL-MCNC: 70 MG/DL (ref 70–110)
POTASSIUM SERPL-SCNC: 3.8 MMOL/L (ref 3.5–5.1)
PROT SERPL-MCNC: 7.4 G/DL (ref 6–8.4)
PROT UR-MCNC: 12 MG/DL
PROT/CREAT UR: 0.06 MG/G{CREAT} (ref 0–0.2)
SODIUM SERPL-SCNC: 135 MMOL/L (ref 136–145)

## 2021-06-09 PROCEDURE — 80053 COMPREHEN METABOLIC PANEL: CPT | Performed by: OBSTETRICS & GYNECOLOGY

## 2021-06-09 PROCEDURE — 82570 ASSAY OF URINE CREATININE: CPT | Performed by: OBSTETRICS & GYNECOLOGY

## 2021-06-09 PROCEDURE — 80307 DRUG TEST PRSMV CHEM ANLYZR: CPT | Performed by: OBSTETRICS & GYNECOLOGY

## 2021-06-09 PROCEDURE — 99214 OFFICE O/P EST MOD 30 MIN: CPT | Mod: TH,S$PBB,, | Performed by: OBSTETRICS & GYNECOLOGY

## 2021-06-09 PROCEDURE — 36415 COLL VENOUS BLD VENIPUNCTURE: CPT | Performed by: OBSTETRICS & GYNECOLOGY

## 2021-06-09 PROCEDURE — 99211 OFF/OP EST MAY X REQ PHY/QHP: CPT | Mod: PBBFAC,TH | Performed by: OBSTETRICS & GYNECOLOGY

## 2021-06-09 PROCEDURE — 86592 SYPHILIS TEST NON-TREP QUAL: CPT | Performed by: OBSTETRICS & GYNECOLOGY

## 2021-06-09 PROCEDURE — 99214 PR OFFICE/OUTPT VISIT, EST, LEVL IV, 30-39 MIN: ICD-10-PCS | Mod: TH,S$PBB,, | Performed by: OBSTETRICS & GYNECOLOGY

## 2021-06-09 PROCEDURE — 81511 FTL CGEN ABNOR FOUR ANAL: CPT | Performed by: OBSTETRICS & GYNECOLOGY

## 2021-06-09 PROCEDURE — 99999 PR PBB SHADOW E&M-EST. PATIENT-LVL I: ICD-10-PCS | Mod: PBBFAC,,, | Performed by: OBSTETRICS & GYNECOLOGY

## 2021-06-09 PROCEDURE — 99999 PR PBB SHADOW E&M-EST. PATIENT-LVL I: CPT | Mod: PBBFAC,,, | Performed by: OBSTETRICS & GYNECOLOGY

## 2021-06-10 LAB
AMPHET+METHAMPHET UR QL: NEGATIVE
BARBITURATES UR QL SCN>200 NG/ML: NEGATIVE
BENZODIAZ UR QL SCN>200 NG/ML: NEGATIVE
BZE UR QL SCN: NEGATIVE
CANNABINOIDS UR QL SCN: NORMAL
CREAT UR-MCNC: 189 MG/DL (ref 15–325)
ETHANOL UR-MCNC: <10 MG/DL
METHADONE UR QL SCN>300 NG/ML: NEGATIVE
OPIATES UR QL SCN: NEGATIVE
PCP UR QL SCN>25 NG/ML: NEGATIVE
RPR SER QL: NORMAL
TOXICOLOGY INFORMATION: NORMAL

## 2021-06-14 LAB
# FETUSES US: NORMAL
2ND TRIMESTER 4 SCREEN PNL SERPL: NEGATIVE
2ND TRIMESTER 4 SCREEN SERPL-IMP: NORMAL
AFP MOM SERPL: 0.91
AFP SERPL-MCNC: 61.3 NG/ML
AGE AT DELIVERY: 33
B-HCG MOM SERPL: 0.74
B-HCG SERPL-ACNC: 12.5 IU/ML
FET TS 21 RISK FROM MAT AGE: NORMAL
GA (DAYS): 6 D
GA (WEEKS): 21 WK
GA METHOD: NORMAL
IDDM PATIENT QL: NORMAL
INHIBIN A MOM SERPL: 0.5
INHIBIN A SERPL-MCNC: 72.7 PG/ML
SMOKING STATUS FTND: NO
TS 18 RISK FETUS: NORMAL
TS 21 RISK FETUS: NORMAL
U ESTRIOL MOM SERPL: 0.71
U ESTRIOL SERPL-MCNC: 1.73 NG/ML

## 2021-07-01 ENCOUNTER — PATIENT MESSAGE (OUTPATIENT)
Dept: ADMINISTRATIVE | Facility: OTHER | Age: 31
End: 2021-07-01

## 2021-07-07 ENCOUNTER — ROUTINE PRENATAL (OUTPATIENT)
Dept: OBSTETRICS AND GYNECOLOGY | Facility: CLINIC | Age: 31
End: 2021-07-07
Payer: MEDICAID

## 2021-07-07 VITALS
SYSTOLIC BLOOD PRESSURE: 130 MMHG | DIASTOLIC BLOOD PRESSURE: 84 MMHG | BODY MASS INDEX: 36.88 KG/M2 | WEIGHT: 235.44 LBS

## 2021-07-07 DIAGNOSIS — O09.299 HISTORY OF PRE-ECLAMPSIA IN PRIOR PREGNANCY, CURRENTLY PREGNANT: Primary | ICD-10-CM

## 2021-07-07 DIAGNOSIS — D57.3 SICKLE CELL TRAIT: ICD-10-CM

## 2021-07-07 DIAGNOSIS — O98.319 GENITAL HERPES SIMPLEX VIRUS (HSV) INFECTION IN MOTHER AFFECTING PREGNANCY: ICD-10-CM

## 2021-07-07 DIAGNOSIS — O34.219 HISTORY OF CESAREAN DELIVERY AFFECTING PREGNANCY: ICD-10-CM

## 2021-07-07 DIAGNOSIS — Z67.91 RH NEGATIVE STATE IN ANTEPARTUM PERIOD: ICD-10-CM

## 2021-07-07 DIAGNOSIS — Z3A.25 25 WEEKS GESTATION OF PREGNANCY: ICD-10-CM

## 2021-07-07 DIAGNOSIS — O26.899 RH NEGATIVE STATE IN ANTEPARTUM PERIOD: ICD-10-CM

## 2021-07-07 DIAGNOSIS — A60.09 GENITAL HERPES SIMPLEX VIRUS (HSV) INFECTION IN MOTHER AFFECTING PREGNANCY: ICD-10-CM

## 2021-07-07 PROCEDURE — 99999 PR PBB SHADOW E&M-EST. PATIENT-LVL II: ICD-10-PCS | Mod: PBBFAC,,, | Performed by: OBSTETRICS & GYNECOLOGY

## 2021-07-07 PROCEDURE — 99215 OFFICE O/P EST HI 40 MIN: CPT | Mod: TH,S$PBB,, | Performed by: OBSTETRICS & GYNECOLOGY

## 2021-07-07 PROCEDURE — 99212 OFFICE O/P EST SF 10 MIN: CPT | Mod: PBBFAC,TH | Performed by: OBSTETRICS & GYNECOLOGY

## 2021-07-07 PROCEDURE — 99215 PR OFFICE/OUTPT VISIT, EST, LEVL V, 40-54 MIN: ICD-10-PCS | Mod: TH,S$PBB,, | Performed by: OBSTETRICS & GYNECOLOGY

## 2021-07-07 PROCEDURE — 99999 PR PBB SHADOW E&M-EST. PATIENT-LVL II: CPT | Mod: PBBFAC,,, | Performed by: OBSTETRICS & GYNECOLOGY

## 2021-07-26 ENCOUNTER — PROCEDURE VISIT (OUTPATIENT)
Dept: MATERNAL FETAL MEDICINE | Facility: CLINIC | Age: 31
End: 2021-07-26
Payer: MEDICAID

## 2021-07-26 PROCEDURE — 76816 OB US FOLLOW-UP PER FETUS: CPT | Mod: PBBFAC | Performed by: OBSTETRICS & GYNECOLOGY

## 2021-07-26 PROCEDURE — 76816 OB US FOLLOW-UP PER FETUS: CPT | Mod: 26,S$PBB,, | Performed by: OBSTETRICS & GYNECOLOGY

## 2021-07-26 PROCEDURE — 76816 PR  US,PREGNANT UTERUS,F/U,TRANSABD APP: ICD-10-PCS | Mod: 26,S$PBB,, | Performed by: OBSTETRICS & GYNECOLOGY

## 2021-08-03 ENCOUNTER — ROUTINE PRENATAL (OUTPATIENT)
Dept: OBSTETRICS AND GYNECOLOGY | Facility: CLINIC | Age: 31
End: 2021-08-03
Payer: MEDICAID

## 2021-08-03 VITALS — WEIGHT: 233 LBS | DIASTOLIC BLOOD PRESSURE: 70 MMHG | SYSTOLIC BLOOD PRESSURE: 132 MMHG | BODY MASS INDEX: 36.49 KG/M2

## 2021-08-03 DIAGNOSIS — O26.899 RH NEGATIVE STATE IN ANTEPARTUM PERIOD: Primary | ICD-10-CM

## 2021-08-03 DIAGNOSIS — Z3A.29 29 WEEKS GESTATION OF PREGNANCY: ICD-10-CM

## 2021-08-03 DIAGNOSIS — O34.219 HISTORY OF CESAREAN DELIVERY AFFECTING PREGNANCY: ICD-10-CM

## 2021-08-03 DIAGNOSIS — Z67.91 RH NEGATIVE STATE IN ANTEPARTUM PERIOD: Primary | ICD-10-CM

## 2021-08-03 DIAGNOSIS — O09.299 HISTORY OF PRE-ECLAMPSIA IN PRIOR PREGNANCY, CURRENTLY PREGNANT: ICD-10-CM

## 2021-08-03 PROCEDURE — 99999 PR PBB SHADOW E&M-EST. PATIENT-LVL III: ICD-10-PCS | Mod: PBBFAC,,, | Performed by: OBSTETRICS & GYNECOLOGY

## 2021-08-03 PROCEDURE — 99213 OFFICE O/P EST LOW 20 MIN: CPT | Mod: TH,S$PBB,, | Performed by: OBSTETRICS & GYNECOLOGY

## 2021-08-03 PROCEDURE — 80307 DRUG TEST PRSMV CHEM ANLYZR: CPT | Performed by: OBSTETRICS & GYNECOLOGY

## 2021-08-03 PROCEDURE — 99999 PR PBB SHADOW E&M-EST. PATIENT-LVL III: CPT | Mod: PBBFAC,,, | Performed by: OBSTETRICS & GYNECOLOGY

## 2021-08-03 PROCEDURE — 99213 OFFICE O/P EST LOW 20 MIN: CPT | Mod: PBBFAC,TH | Performed by: OBSTETRICS & GYNECOLOGY

## 2021-08-03 PROCEDURE — 99213 PR OFFICE/OUTPT VISIT, EST, LEVL III, 20-29 MIN: ICD-10-PCS | Mod: TH,S$PBB,, | Performed by: OBSTETRICS & GYNECOLOGY

## 2021-08-04 LAB
AMPHET+METHAMPHET UR QL: NEGATIVE
BARBITURATES UR QL SCN>200 NG/ML: NEGATIVE
BENZODIAZ UR QL SCN>200 NG/ML: NEGATIVE
BZE UR QL SCN: NEGATIVE
CANNABINOIDS UR QL SCN: NEGATIVE
CREAT UR-MCNC: 123 MG/DL (ref 15–325)
ETHANOL UR-MCNC: <10 MG/DL
METHADONE UR QL SCN>300 NG/ML: NEGATIVE
OPIATES UR QL SCN: NEGATIVE
PCP UR QL SCN>25 NG/ML: NEGATIVE
TOXICOLOGY INFORMATION: NORMAL

## 2021-08-06 DIAGNOSIS — Z3A.29 29 WEEKS GESTATION OF PREGNANCY: Primary | ICD-10-CM

## 2021-08-19 ENCOUNTER — PATIENT MESSAGE (OUTPATIENT)
Dept: ADMINISTRATIVE | Facility: OTHER | Age: 31
End: 2021-08-19

## 2021-08-19 ENCOUNTER — ROUTINE PRENATAL (OUTPATIENT)
Dept: OBSTETRICS AND GYNECOLOGY | Facility: CLINIC | Age: 31
End: 2021-08-19
Payer: MEDICAID

## 2021-08-19 VITALS
BODY MASS INDEX: 37.19 KG/M2 | SYSTOLIC BLOOD PRESSURE: 136 MMHG | WEIGHT: 237.44 LBS | DIASTOLIC BLOOD PRESSURE: 76 MMHG

## 2021-08-19 DIAGNOSIS — O09.299 HISTORY OF PRE-ECLAMPSIA IN PRIOR PREGNANCY, CURRENTLY PREGNANT: Primary | ICD-10-CM

## 2021-08-19 DIAGNOSIS — Z67.91 RH NEGATIVE STATE IN ANTEPARTUM PERIOD: ICD-10-CM

## 2021-08-19 DIAGNOSIS — O99.210 OBESITY AFFECTING PREGNANCY, ANTEPARTUM: ICD-10-CM

## 2021-08-19 DIAGNOSIS — O34.219 HISTORY OF CESAREAN DELIVERY AFFECTING PREGNANCY: ICD-10-CM

## 2021-08-19 DIAGNOSIS — O26.899 RH NEGATIVE STATE IN ANTEPARTUM PERIOD: ICD-10-CM

## 2021-08-19 DIAGNOSIS — Z3A.32 32 WEEKS GESTATION OF PREGNANCY: ICD-10-CM

## 2021-08-19 PROCEDURE — 99214 PR OFFICE/OUTPT VISIT, EST, LEVL IV, 30-39 MIN: ICD-10-PCS | Mod: S$PBB,TH,, | Performed by: OBSTETRICS & GYNECOLOGY

## 2021-08-19 PROCEDURE — 99999 PR PBB SHADOW E&M-EST. PATIENT-LVL III: ICD-10-PCS | Mod: PBBFAC,,, | Performed by: OBSTETRICS & GYNECOLOGY

## 2021-08-19 PROCEDURE — 99214 OFFICE O/P EST MOD 30 MIN: CPT | Mod: S$PBB,TH,, | Performed by: OBSTETRICS & GYNECOLOGY

## 2021-08-19 PROCEDURE — 99999 PR PBB SHADOW E&M-EST. PATIENT-LVL III: CPT | Mod: PBBFAC,,, | Performed by: OBSTETRICS & GYNECOLOGY

## 2021-08-19 PROCEDURE — 99213 OFFICE O/P EST LOW 20 MIN: CPT | Mod: PBBFAC,TH | Performed by: OBSTETRICS & GYNECOLOGY

## 2021-08-21 ENCOUNTER — HOSPITAL ENCOUNTER (OUTPATIENT)
Dept: OBSTETRICS AND GYNECOLOGY | Facility: HOSPITAL | Age: 31
Discharge: HOME | End: 2021-08-21
Attending: OBSTETRICS & GYNECOLOGY
Payer: MEDICAID

## 2021-08-21 DIAGNOSIS — O34.219 HISTORY OF CESAREAN DELIVERY AFFECTING PREGNANCY: ICD-10-CM

## 2021-08-21 DIAGNOSIS — O09.299 HISTORY OF PRE-ECLAMPSIA IN PRIOR PREGNANCY, CURRENTLY PREGNANT: ICD-10-CM

## 2021-08-21 LAB — INJECT RH IG VOL PATIENT: NORMAL ML

## 2021-08-21 PROCEDURE — 63600519 RHOGAM PHARM REV CODE 636 ALT 250 W HCPCS: Performed by: OBSTETRICS & GYNECOLOGY

## 2021-08-21 PROCEDURE — 96372 THER/PROPH/DIAG INJ SC/IM: CPT

## 2021-08-21 RX ADMIN — HUMAN RHO(D) IMMUNE GLOBULIN 300 MCG: 300 INJECTION, SOLUTION INTRAMUSCULAR at 11:08

## 2021-08-23 ENCOUNTER — TELEPHONE (OUTPATIENT)
Dept: OBSTETRICS AND GYNECOLOGY | Facility: CLINIC | Age: 31
End: 2021-08-23

## 2021-08-25 ENCOUNTER — ROUTINE PRENATAL (OUTPATIENT)
Dept: OBSTETRICS AND GYNECOLOGY | Facility: CLINIC | Age: 31
End: 2021-08-25
Payer: MEDICAID

## 2021-08-25 VITALS
DIASTOLIC BLOOD PRESSURE: 80 MMHG | SYSTOLIC BLOOD PRESSURE: 130 MMHG | WEIGHT: 236.69 LBS | BODY MASS INDEX: 37.07 KG/M2

## 2021-08-25 DIAGNOSIS — Z3A.32 PREGNANCY WITH 32 COMPLETED WEEKS GESTATION: Primary | ICD-10-CM

## 2021-08-25 DIAGNOSIS — O34.219 H/O CESAREAN SECTION COMPLICATING PREGNANCY: ICD-10-CM

## 2021-08-25 PROCEDURE — 99999 PR PBB SHADOW E&M-EST. PATIENT-LVL II: CPT | Mod: PBBFAC,,, | Performed by: OBSTETRICS & GYNECOLOGY

## 2021-08-25 PROCEDURE — 99213 PR OFFICE/OUTPT VISIT, EST, LEVL III, 20-29 MIN: ICD-10-PCS | Mod: TH,S$PBB,, | Performed by: OBSTETRICS & GYNECOLOGY

## 2021-08-25 PROCEDURE — 99212 OFFICE O/P EST SF 10 MIN: CPT | Mod: PBBFAC,TH | Performed by: OBSTETRICS & GYNECOLOGY

## 2021-08-25 PROCEDURE — 99999 PR PBB SHADOW E&M-EST. PATIENT-LVL II: ICD-10-PCS | Mod: PBBFAC,,, | Performed by: OBSTETRICS & GYNECOLOGY

## 2021-08-25 PROCEDURE — 99213 OFFICE O/P EST LOW 20 MIN: CPT | Mod: TH,S$PBB,, | Performed by: OBSTETRICS & GYNECOLOGY

## 2021-09-15 ENCOUNTER — TELEPHONE (OUTPATIENT)
Dept: OBSTETRICS AND GYNECOLOGY | Facility: CLINIC | Age: 31
End: 2021-09-15

## 2021-09-15 ENCOUNTER — HOSPITAL ENCOUNTER (EMERGENCY)
Facility: HOSPITAL | Age: 31
Discharge: HOME | End: 2021-09-15
Attending: EMERGENCY MEDICINE
Payer: MEDICAID

## 2021-09-15 VITALS
SYSTOLIC BLOOD PRESSURE: 133 MMHG | WEIGHT: 233 LBS | RESPIRATION RATE: 20 BRPM | BODY MASS INDEX: 36.57 KG/M2 | DIASTOLIC BLOOD PRESSURE: 70 MMHG | HEART RATE: 98 BPM | TEMPERATURE: 98 F | OXYGEN SATURATION: 100 % | HEIGHT: 67 IN

## 2021-09-15 DIAGNOSIS — K62.89 RECTAL PAIN: ICD-10-CM

## 2021-09-15 DIAGNOSIS — K59.00 CONSTIPATION, UNSPECIFIED CONSTIPATION TYPE: Primary | ICD-10-CM

## 2021-09-15 PROCEDURE — 25000003 PHARM REV CODE 250: Performed by: PHYSICIAN ASSISTANT

## 2021-09-15 PROCEDURE — 99284 EMERGENCY DEPT VISIT MOD MDM: CPT

## 2021-09-15 RX ORDER — DOCUSATE SODIUM 100 MG/1
100 CAPSULE, LIQUID FILLED ORAL
Status: COMPLETED | OUTPATIENT
Start: 2021-09-15 | End: 2021-09-15

## 2021-09-15 RX ORDER — ACETAMINOPHEN 500 MG
500 TABLET ORAL EVERY 4 HOURS PRN
Qty: 20 TABLET | Refills: 0 | Status: SHIPPED | OUTPATIENT
Start: 2021-09-15 | End: 2021-09-20

## 2021-09-15 RX ORDER — ACETAMINOPHEN 500 MG
500 TABLET ORAL
Status: COMPLETED | OUTPATIENT
Start: 2021-09-15 | End: 2021-09-15

## 2021-09-15 RX ORDER — POLYETHYLENE GLYCOL 3350 17 G/17G
17 POWDER, FOR SOLUTION ORAL DAILY
Qty: 5 EACH | Refills: 0 | Status: SHIPPED | OUTPATIENT
Start: 2021-09-15 | End: 2021-09-20

## 2021-09-15 RX ORDER — DOCUSATE SODIUM 100 MG/1
100 CAPSULE, LIQUID FILLED ORAL 2 TIMES DAILY
Qty: 20 CAPSULE | Refills: 0 | Status: SHIPPED | OUTPATIENT
Start: 2021-09-15 | End: 2021-09-25

## 2021-09-15 RX ADMIN — ACETAMINOPHEN 500 MG: 500 TABLET ORAL at 04:09

## 2021-09-15 RX ADMIN — DOCUSATE SODIUM 100 MG: 100 CAPSULE ORAL at 04:09

## 2021-09-17 ENCOUNTER — TELEPHONE (OUTPATIENT)
Dept: OBSTETRICS AND GYNECOLOGY | Facility: CLINIC | Age: 31
End: 2021-09-17

## 2021-09-22 ENCOUNTER — ROUTINE PRENATAL (OUTPATIENT)
Dept: OBSTETRICS AND GYNECOLOGY | Facility: CLINIC | Age: 31
End: 2021-09-22
Payer: MEDICAID

## 2021-09-22 VITALS
BODY MASS INDEX: 38.01 KG/M2 | DIASTOLIC BLOOD PRESSURE: 82 MMHG | SYSTOLIC BLOOD PRESSURE: 132 MMHG | WEIGHT: 242.69 LBS

## 2021-09-22 DIAGNOSIS — O09.299 HISTORY OF PRE-ECLAMPSIA IN PRIOR PREGNANCY, CURRENTLY PREGNANT: ICD-10-CM

## 2021-09-22 DIAGNOSIS — O26.899 RH NEGATIVE STATE IN ANTEPARTUM PERIOD: ICD-10-CM

## 2021-09-22 DIAGNOSIS — O34.219 H/O CESAREAN SECTION COMPLICATING PREGNANCY: ICD-10-CM

## 2021-09-22 DIAGNOSIS — Z67.91 RH NEGATIVE STATE IN ANTEPARTUM PERIOD: ICD-10-CM

## 2021-09-22 PROCEDURE — 99214 PR OFFICE/OUTPT VISIT, EST, LEVL IV, 30-39 MIN: ICD-10-PCS | Mod: S$PBB,TH,, | Performed by: OBSTETRICS & GYNECOLOGY

## 2021-09-22 PROCEDURE — 99999 PR PBB SHADOW E&M-EST. PATIENT-LVL II: CPT | Mod: PBBFAC,,, | Performed by: OBSTETRICS & GYNECOLOGY

## 2021-09-22 PROCEDURE — 99212 OFFICE O/P EST SF 10 MIN: CPT | Mod: PBBFAC,TH | Performed by: OBSTETRICS & GYNECOLOGY

## 2021-09-22 PROCEDURE — 99999 PR PBB SHADOW E&M-EST. PATIENT-LVL II: ICD-10-PCS | Mod: PBBFAC,,, | Performed by: OBSTETRICS & GYNECOLOGY

## 2021-09-22 PROCEDURE — 99214 OFFICE O/P EST MOD 30 MIN: CPT | Mod: S$PBB,TH,, | Performed by: OBSTETRICS & GYNECOLOGY

## 2021-09-22 RX ORDER — VALACYCLOVIR HYDROCHLORIDE 500 MG/1
500 TABLET, FILM COATED ORAL 2 TIMES DAILY
Qty: 60 TABLET | Refills: 1 | Status: SHIPPED | OUTPATIENT
Start: 2021-09-22 | End: 2021-09-22 | Stop reason: SDUPTHER

## 2021-09-22 RX ORDER — VALACYCLOVIR HYDROCHLORIDE 500 MG/1
500 TABLET, FILM COATED ORAL 2 TIMES DAILY
Qty: 60 TABLET | Refills: 1 | Status: SHIPPED | OUTPATIENT
Start: 2021-09-22 | End: 2022-10-24

## 2021-09-23 ENCOUNTER — ROUTINE PRENATAL (OUTPATIENT)
Dept: OBSTETRICS AND GYNECOLOGY | Facility: CLINIC | Age: 31
End: 2021-09-23
Attending: OBSTETRICS & GYNECOLOGY
Payer: MEDICAID

## 2021-09-23 ENCOUNTER — TELEPHONE (OUTPATIENT)
Dept: OBSTETRICS AND GYNECOLOGY | Facility: CLINIC | Age: 31
End: 2021-09-23

## 2021-09-23 VITALS
BODY MASS INDEX: 38.02 KG/M2 | WEIGHT: 242.75 LBS | DIASTOLIC BLOOD PRESSURE: 78 MMHG | SYSTOLIC BLOOD PRESSURE: 114 MMHG

## 2021-09-23 DIAGNOSIS — O09.299 HISTORY OF PRE-ECLAMPSIA IN PRIOR PREGNANCY, CURRENTLY PREGNANT: Primary | ICD-10-CM

## 2021-09-23 DIAGNOSIS — O34.219 HISTORY OF CESAREAN DELIVERY AFFECTING PREGNANCY: ICD-10-CM

## 2021-09-23 PROBLEM — Z98.891 H/O CESAREAN SECTION: Status: RESOLVED | Noted: 2017-12-14 | Resolved: 2021-09-23

## 2021-09-23 PROCEDURE — 87081 CULTURE SCREEN ONLY: CPT

## 2021-09-23 PROCEDURE — 99999 PR PBB SHADOW E&M-EST. PATIENT-LVL III: ICD-10-PCS | Mod: PBBFAC,,, | Performed by: OBSTETRICS & GYNECOLOGY

## 2021-09-23 PROCEDURE — 87147 CULTURE TYPE IMMUNOLOGIC: CPT

## 2021-09-23 PROCEDURE — 99999 PR PBB SHADOW E&M-EST. PATIENT-LVL III: CPT | Mod: PBBFAC,,, | Performed by: OBSTETRICS & GYNECOLOGY

## 2021-09-23 PROCEDURE — 99213 OFFICE O/P EST LOW 20 MIN: CPT | Mod: TH,S$PBB,, | Performed by: OBSTETRICS & GYNECOLOGY

## 2021-09-23 PROCEDURE — 99213 OFFICE O/P EST LOW 20 MIN: CPT | Mod: PBBFAC,TH,PN | Performed by: OBSTETRICS & GYNECOLOGY

## 2021-09-23 PROCEDURE — 99213 PR OFFICE/OUTPT VISIT, EST, LEVL III, 20-29 MIN: ICD-10-PCS | Mod: TH,S$PBB,, | Performed by: OBSTETRICS & GYNECOLOGY

## 2021-09-26 LAB — BACTERIA SPEC AEROBE CULT: ABNORMAL

## 2021-09-30 ENCOUNTER — PATIENT MESSAGE (OUTPATIENT)
Dept: OBSTETRICS AND GYNECOLOGY | Facility: CLINIC | Age: 31
End: 2021-09-30

## 2021-09-30 ENCOUNTER — LAB VISIT (OUTPATIENT)
Dept: LAB | Facility: OTHER | Age: 31
End: 2021-09-30
Payer: MEDICAID

## 2021-09-30 ENCOUNTER — ROUTINE PRENATAL (OUTPATIENT)
Dept: OBSTETRICS AND GYNECOLOGY | Facility: CLINIC | Age: 31
End: 2021-09-30
Attending: OBSTETRICS & GYNECOLOGY
Payer: MEDICAID

## 2021-09-30 VITALS
SYSTOLIC BLOOD PRESSURE: 130 MMHG | WEIGHT: 244.94 LBS | DIASTOLIC BLOOD PRESSURE: 72 MMHG | BODY MASS INDEX: 38.36 KG/M2

## 2021-09-30 DIAGNOSIS — A60.09 GENITAL HERPES SIMPLEX VIRUS (HSV) INFECTION IN MOTHER AFFECTING PREGNANCY: ICD-10-CM

## 2021-09-30 DIAGNOSIS — O09.299 HISTORY OF PRE-ECLAMPSIA IN PRIOR PREGNANCY, CURRENTLY PREGNANT: ICD-10-CM

## 2021-09-30 DIAGNOSIS — O99.820 GBS (GROUP B STREPTOCOCCUS CARRIER), +RV CULTURE, CURRENTLY PREGNANT: ICD-10-CM

## 2021-09-30 DIAGNOSIS — O09.299 HISTORY OF PRE-ECLAMPSIA IN PRIOR PREGNANCY, CURRENTLY PREGNANT: Primary | ICD-10-CM

## 2021-09-30 DIAGNOSIS — O34.219 HISTORY OF CESAREAN DELIVERY AFFECTING PREGNANCY: ICD-10-CM

## 2021-09-30 DIAGNOSIS — O98.319 GENITAL HERPES SIMPLEX VIRUS (HSV) INFECTION IN MOTHER AFFECTING PREGNANCY: ICD-10-CM

## 2021-09-30 LAB
BASOPHILS # BLD AUTO: 0.06 K/UL (ref 0–0.2)
BASOPHILS NFR BLD: 0.9 % (ref 0–1.9)
DIFFERENTIAL METHOD: ABNORMAL
EOSINOPHIL # BLD AUTO: 0.3 K/UL (ref 0–0.5)
EOSINOPHIL NFR BLD: 4 % (ref 0–8)
ERYTHROCYTE [DISTWIDTH] IN BLOOD BY AUTOMATED COUNT: 16 % (ref 11.5–14.5)
HCT VFR BLD AUTO: 29.5 % (ref 37–48.5)
HGB BLD-MCNC: 9.7 G/DL (ref 12–16)
IMM GRANULOCYTES # BLD AUTO: 0.04 K/UL (ref 0–0.04)
IMM GRANULOCYTES NFR BLD AUTO: 0.6 % (ref 0–0.5)
LYMPHOCYTES # BLD AUTO: 1.3 K/UL (ref 1–4.8)
LYMPHOCYTES NFR BLD: 18 % (ref 18–48)
MCH RBC QN AUTO: 27.2 PG (ref 27–31)
MCHC RBC AUTO-ENTMCNC: 32.9 G/DL (ref 32–36)
MCV RBC AUTO: 83 FL (ref 82–98)
MONOCYTES # BLD AUTO: 0.4 K/UL (ref 0.3–1)
MONOCYTES NFR BLD: 5.5 % (ref 4–15)
NEUTROPHILS # BLD AUTO: 5 K/UL (ref 1.8–7.7)
NEUTROPHILS NFR BLD: 71 % (ref 38–73)
NRBC BLD-RTO: 0 /100 WBC
PLATELET # BLD AUTO: 279 K/UL (ref 150–450)
PMV BLD AUTO: 10.7 FL (ref 9.2–12.9)
RBC # BLD AUTO: 3.57 M/UL (ref 4–5.4)
WBC # BLD AUTO: 7.05 K/UL (ref 3.9–12.7)

## 2021-09-30 PROCEDURE — 99213 OFFICE O/P EST LOW 20 MIN: CPT | Mod: TH,S$PBB,, | Performed by: OBSTETRICS & GYNECOLOGY

## 2021-09-30 PROCEDURE — 99213 PR OFFICE/OUTPT VISIT, EST, LEVL III, 20-29 MIN: ICD-10-PCS | Mod: TH,S$PBB,, | Performed by: OBSTETRICS & GYNECOLOGY

## 2021-09-30 PROCEDURE — 99213 OFFICE O/P EST LOW 20 MIN: CPT | Mod: PBBFAC,TH,PN | Performed by: OBSTETRICS & GYNECOLOGY

## 2021-09-30 PROCEDURE — 99999 PR PBB SHADOW E&M-EST. PATIENT-LVL III: ICD-10-PCS | Mod: PBBFAC,,, | Performed by: OBSTETRICS & GYNECOLOGY

## 2021-09-30 PROCEDURE — 99999 PR PBB SHADOW E&M-EST. PATIENT-LVL III: CPT | Mod: PBBFAC,,, | Performed by: OBSTETRICS & GYNECOLOGY

## 2021-09-30 PROCEDURE — 86592 SYPHILIS TEST NON-TREP QUAL: CPT

## 2021-09-30 PROCEDURE — 36415 COLL VENOUS BLD VENIPUNCTURE: CPT

## 2021-09-30 PROCEDURE — 85025 COMPLETE CBC W/AUTO DIFF WBC: CPT

## 2021-09-30 PROCEDURE — 87389 HIV-1 AG W/HIV-1&-2 AB AG IA: CPT

## 2021-10-01 LAB
HIV 1+2 AB+HIV1 P24 AG SERPL QL IA: NEGATIVE
RPR SER QL: NORMAL

## 2021-10-07 ENCOUNTER — ROUTINE PRENATAL (OUTPATIENT)
Dept: OBSTETRICS AND GYNECOLOGY | Facility: CLINIC | Age: 31
End: 2021-10-07
Attending: OBSTETRICS & GYNECOLOGY
Payer: MEDICAID

## 2021-10-07 VITALS — SYSTOLIC BLOOD PRESSURE: 92 MMHG | WEIGHT: 250 LBS | DIASTOLIC BLOOD PRESSURE: 68 MMHG | BODY MASS INDEX: 39.16 KG/M2

## 2021-10-07 DIAGNOSIS — O34.219 HISTORY OF CESAREAN DELIVERY AFFECTING PREGNANCY: ICD-10-CM

## 2021-10-07 DIAGNOSIS — Z67.91 RH NEGATIVE STATE IN ANTEPARTUM PERIOD: ICD-10-CM

## 2021-10-07 DIAGNOSIS — O26.899 RH NEGATIVE STATE IN ANTEPARTUM PERIOD: ICD-10-CM

## 2021-10-07 DIAGNOSIS — O99.820 GBS (GROUP B STREPTOCOCCUS CARRIER), +RV CULTURE, CURRENTLY PREGNANT: ICD-10-CM

## 2021-10-07 DIAGNOSIS — O09.299 HISTORY OF PRE-ECLAMPSIA IN PRIOR PREGNANCY, CURRENTLY PREGNANT: Primary | ICD-10-CM

## 2021-10-07 DIAGNOSIS — A60.09 GENITAL HERPES SIMPLEX VIRUS (HSV) INFECTION IN MOTHER AFFECTING PREGNANCY: ICD-10-CM

## 2021-10-07 DIAGNOSIS — O98.319 GENITAL HERPES SIMPLEX VIRUS (HSV) INFECTION IN MOTHER AFFECTING PREGNANCY: ICD-10-CM

## 2021-10-07 PROCEDURE — 99212 OFFICE O/P EST SF 10 MIN: CPT | Mod: PBBFAC,TH,PN | Performed by: OBSTETRICS & GYNECOLOGY

## 2021-10-07 PROCEDURE — 99999 PR PBB SHADOW E&M-EST. PATIENT-LVL II: CPT | Mod: PBBFAC,,, | Performed by: OBSTETRICS & GYNECOLOGY

## 2021-10-07 PROCEDURE — 99213 OFFICE O/P EST LOW 20 MIN: CPT | Mod: TH,S$PBB,, | Performed by: OBSTETRICS & GYNECOLOGY

## 2021-10-07 PROCEDURE — 99999 PR PBB SHADOW E&M-EST. PATIENT-LVL II: ICD-10-PCS | Mod: PBBFAC,,, | Performed by: OBSTETRICS & GYNECOLOGY

## 2021-10-07 PROCEDURE — 99213 PR OFFICE/OUTPT VISIT, EST, LEVL III, 20-29 MIN: ICD-10-PCS | Mod: TH,S$PBB,, | Performed by: OBSTETRICS & GYNECOLOGY

## 2021-10-14 ENCOUNTER — ROUTINE PRENATAL (OUTPATIENT)
Dept: OBSTETRICS AND GYNECOLOGY | Facility: CLINIC | Age: 31
End: 2021-10-14
Attending: OBSTETRICS & GYNECOLOGY
Payer: MEDICAID

## 2021-10-14 VITALS — SYSTOLIC BLOOD PRESSURE: 118 MMHG | DIASTOLIC BLOOD PRESSURE: 70 MMHG | BODY MASS INDEX: 39.5 KG/M2 | WEIGHT: 252.19 LBS

## 2021-10-14 DIAGNOSIS — O26.899 RH NEGATIVE STATE IN ANTEPARTUM PERIOD: ICD-10-CM

## 2021-10-14 DIAGNOSIS — O99.820 GBS (GROUP B STREPTOCOCCUS CARRIER), +RV CULTURE, CURRENTLY PREGNANT: ICD-10-CM

## 2021-10-14 DIAGNOSIS — Z67.91 RH NEGATIVE STATE IN ANTEPARTUM PERIOD: ICD-10-CM

## 2021-10-14 DIAGNOSIS — O48.0 POST-TERM PREGNANCY, 40-42 WEEKS OF GESTATION: ICD-10-CM

## 2021-10-14 DIAGNOSIS — O34.219 HISTORY OF CESAREAN DELIVERY AFFECTING PREGNANCY: Primary | ICD-10-CM

## 2021-10-14 DIAGNOSIS — O09.299 HISTORY OF PRE-ECLAMPSIA IN PRIOR PREGNANCY, CURRENTLY PREGNANT: ICD-10-CM

## 2021-10-14 PROCEDURE — 99999 PR PBB SHADOW E&M-EST. PATIENT-LVL III: ICD-10-PCS | Mod: PBBFAC,,, | Performed by: OBSTETRICS & GYNECOLOGY

## 2021-10-14 PROCEDURE — 99999 PR PBB SHADOW E&M-EST. PATIENT-LVL III: CPT | Mod: PBBFAC,,, | Performed by: OBSTETRICS & GYNECOLOGY

## 2021-10-14 PROCEDURE — 99213 PR OFFICE/OUTPT VISIT, EST, LEVL III, 20-29 MIN: ICD-10-PCS | Mod: TH,S$PBB,, | Performed by: OBSTETRICS & GYNECOLOGY

## 2021-10-14 PROCEDURE — 99213 OFFICE O/P EST LOW 20 MIN: CPT | Mod: TH,S$PBB,, | Performed by: OBSTETRICS & GYNECOLOGY

## 2021-10-14 PROCEDURE — 99213 OFFICE O/P EST LOW 20 MIN: CPT | Mod: PBBFAC,TH,PN | Performed by: OBSTETRICS & GYNECOLOGY

## 2021-10-15 ENCOUNTER — TELEPHONE (OUTPATIENT)
Dept: OBSTETRICS AND GYNECOLOGY | Facility: CLINIC | Age: 31
End: 2021-10-15

## 2021-10-15 ENCOUNTER — HOSPITAL ENCOUNTER (OUTPATIENT)
Dept: PERINATAL CARE | Facility: OTHER | Age: 31
Discharge: HOME | End: 2021-10-15
Attending: OBSTETRICS & GYNECOLOGY
Payer: MEDICAID

## 2021-10-15 DIAGNOSIS — O48.0 POST-TERM PREGNANCY, 40-42 WEEKS OF GESTATION: ICD-10-CM

## 2021-10-15 PROCEDURE — 76818 FETAL BIOPHYS PROFILE W/NST: CPT

## 2021-10-15 PROCEDURE — 76819 PR US, OB, FETAL BIOPHYSICAL, W/O NST: ICD-10-PCS | Mod: 26,,, | Performed by: OBSTETRICS & GYNECOLOGY

## 2021-10-15 PROCEDURE — 76819 FETAL BIOPHYS PROFIL W/O NST: CPT | Mod: 26,,, | Performed by: OBSTETRICS & GYNECOLOGY

## 2021-10-15 PROCEDURE — 76819 FETAL BIOPHYS PROFIL W/O NST: CPT

## 2021-10-18 ENCOUNTER — HOSPITAL ENCOUNTER (OUTPATIENT)
Dept: PERINATAL CARE | Facility: OTHER | Age: 31
Discharge: HOME | End: 2021-10-18
Attending: OBSTETRICS & GYNECOLOGY
Payer: MEDICAID

## 2021-10-18 DIAGNOSIS — O48.0 POST-TERM PREGNANCY, 40-42 WEEKS OF GESTATION: ICD-10-CM

## 2021-10-18 PROCEDURE — 59025 FETAL NON-STRESS TEST: CPT | Mod: 26,,, | Performed by: OBSTETRICS & GYNECOLOGY

## 2021-10-18 PROCEDURE — 59025 FETAL NON-STRESS TEST: CPT

## 2021-10-18 PROCEDURE — 76815 PR  US,PREGNANT UTERUS,LIMITED, 1/> FETUSES: ICD-10-PCS | Mod: 26,,, | Performed by: OBSTETRICS & GYNECOLOGY

## 2021-10-18 PROCEDURE — 59025 PR FETAL 2N-STRESS TEST: ICD-10-PCS | Mod: 26,,, | Performed by: OBSTETRICS & GYNECOLOGY

## 2021-10-18 PROCEDURE — 76815 OB US LIMITED FETUS(S): CPT

## 2021-10-18 PROCEDURE — 76815 OB US LIMITED FETUS(S): CPT | Mod: 26,,, | Performed by: OBSTETRICS & GYNECOLOGY

## 2021-10-20 ENCOUNTER — HOSPITAL ENCOUNTER (OUTPATIENT)
Dept: PERINATAL CARE | Facility: OTHER | Age: 31
Discharge: HOME | End: 2021-10-20
Attending: OBSTETRICS & GYNECOLOGY
Payer: MEDICAID

## 2021-10-20 DIAGNOSIS — O48.0 POST-TERM PREGNANCY, 40-42 WEEKS OF GESTATION: ICD-10-CM

## 2021-10-20 PROCEDURE — 76815 OB US LIMITED FETUS(S): CPT

## 2021-10-20 PROCEDURE — 76815 OB US LIMITED FETUS(S): CPT | Mod: 26,,, | Performed by: OBSTETRICS & GYNECOLOGY

## 2021-10-20 PROCEDURE — 59025 PR FETAL 2N-STRESS TEST: ICD-10-PCS | Mod: 26,,, | Performed by: OBSTETRICS & GYNECOLOGY

## 2021-10-20 PROCEDURE — 59025 FETAL NON-STRESS TEST: CPT

## 2021-10-20 PROCEDURE — 59025 FETAL NON-STRESS TEST: CPT | Mod: 26,,, | Performed by: OBSTETRICS & GYNECOLOGY

## 2021-10-20 PROCEDURE — 76815 PR  US,PREGNANT UTERUS,LIMITED, 1/> FETUSES: ICD-10-PCS | Mod: 26,,, | Performed by: OBSTETRICS & GYNECOLOGY

## 2021-10-21 ENCOUNTER — HOSPITAL ENCOUNTER (INPATIENT)
Facility: OTHER | Age: 31
LOS: 4 days | Discharge: HOME | End: 2021-10-25
Attending: OBSTETRICS & GYNECOLOGY | Admitting: OBSTETRICS & GYNECOLOGY
Payer: MEDICAID

## 2021-10-21 ENCOUNTER — ROUTINE PRENATAL (OUTPATIENT)
Dept: OBSTETRICS AND GYNECOLOGY | Facility: CLINIC | Age: 31
End: 2021-10-21
Attending: OBSTETRICS & GYNECOLOGY
Payer: MEDICAID

## 2021-10-21 VITALS
BODY MASS INDEX: 40.43 KG/M2 | DIASTOLIC BLOOD PRESSURE: 90 MMHG | WEIGHT: 258.19 LBS | SYSTOLIC BLOOD PRESSURE: 134 MMHG

## 2021-10-21 DIAGNOSIS — Z98.891 S/P CESAREAN SECTION: Primary | ICD-10-CM

## 2021-10-21 DIAGNOSIS — O26.899 RH NEGATIVE STATE IN ANTEPARTUM PERIOD: ICD-10-CM

## 2021-10-21 DIAGNOSIS — O99.820 GBS (GROUP B STREPTOCOCCUS CARRIER), +RV CULTURE, CURRENTLY PREGNANT: ICD-10-CM

## 2021-10-21 DIAGNOSIS — O98.319 GENITAL HERPES SIMPLEX VIRUS (HSV) INFECTION IN MOTHER AFFECTING PREGNANCY: ICD-10-CM

## 2021-10-21 DIAGNOSIS — Z67.91 RH NEGATIVE STATE IN ANTEPARTUM PERIOD: ICD-10-CM

## 2021-10-21 DIAGNOSIS — O34.219 HISTORY OF CESAREAN DELIVERY AFFECTING PREGNANCY: ICD-10-CM

## 2021-10-21 DIAGNOSIS — O48.0 41 WEEKS GESTATION OF PREGNANCY: ICD-10-CM

## 2021-10-21 DIAGNOSIS — A60.09 GENITAL HERPES SIMPLEX VIRUS (HSV) INFECTION IN MOTHER AFFECTING PREGNANCY: ICD-10-CM

## 2021-10-21 DIAGNOSIS — O09.299 HISTORY OF PRE-ECLAMPSIA IN PRIOR PREGNANCY, CURRENTLY PREGNANT: Primary | ICD-10-CM

## 2021-10-21 DIAGNOSIS — Z3A.41 41 WEEKS GESTATION OF PREGNANCY: ICD-10-CM

## 2021-10-21 DIAGNOSIS — O13.3 GESTATIONAL HYPERTENSION, THIRD TRIMESTER: ICD-10-CM

## 2021-10-21 PROBLEM — I10 HYPERTENSION: Status: ACTIVE | Noted: 2021-10-21

## 2021-10-21 LAB
ABO + RH BLD: NORMAL
ALBUMIN SERPL BCP-MCNC: 2.5 G/DL (ref 3.5–5.2)
ALP SERPL-CCNC: 165 U/L (ref 55–135)
ALT SERPL W/O P-5'-P-CCNC: 6 U/L (ref 10–44)
ANION GAP SERPL CALC-SCNC: 11 MMOL/L (ref 8–16)
AST SERPL-CCNC: 10 U/L (ref 10–40)
BASOPHILS # BLD AUTO: 0.05 K/UL (ref 0–0.2)
BASOPHILS NFR BLD: 0.7 % (ref 0–1.9)
BILIRUB SERPL-MCNC: 0.4 MG/DL (ref 0.1–1)
BILIRUB SERPL-MCNC: NORMAL MG/DL
BLD GP AB SCN CELLS X3 SERPL QL: NORMAL
BLOOD URINE, POC: 50
BUN SERPL-MCNC: 8 MG/DL (ref 6–20)
CALCIUM SERPL-MCNC: 9.1 MG/DL (ref 8.7–10.5)
CHLORIDE SERPL-SCNC: 104 MMOL/L (ref 95–110)
CO2 SERPL-SCNC: 19 MMOL/L (ref 23–29)
COLOR, POC UA: YELLOW
CREAT SERPL-MCNC: 0.7 MG/DL (ref 0.5–1.4)
CREAT UR-MCNC: 63.3 MG/DL (ref 15–325)
DIFFERENTIAL METHOD: ABNORMAL
EOSINOPHIL # BLD AUTO: 0.3 K/UL (ref 0–0.5)
EOSINOPHIL NFR BLD: 4 % (ref 0–8)
ERYTHROCYTE [DISTWIDTH] IN BLOOD BY AUTOMATED COUNT: 16.6 % (ref 11.5–14.5)
EST. GFR  (AFRICAN AMERICAN): >60 ML/MIN/1.73 M^2
EST. GFR  (NON AFRICAN AMERICAN): >60 ML/MIN/1.73 M^2
GLUCOSE SERPL-MCNC: 89 MG/DL (ref 70–110)
GLUCOSE UR QL STRIP: NORMAL
HCT VFR BLD AUTO: 27.4 % (ref 37–48.5)
HGB BLD-MCNC: 9.1 G/DL (ref 12–16)
IMM GRANULOCYTES # BLD AUTO: 0.05 K/UL (ref 0–0.04)
IMM GRANULOCYTES NFR BLD AUTO: 0.7 % (ref 0–0.5)
KETONES UR QL STRIP: NORMAL
LEUKOCYTE ESTERASE URINE, POC: NORMAL
LYMPHOCYTES # BLD AUTO: 1.4 K/UL (ref 1–4.8)
LYMPHOCYTES NFR BLD: 18.6 % (ref 18–48)
MCH RBC QN AUTO: 27.5 PG (ref 27–31)
MCHC RBC AUTO-ENTMCNC: 33.2 G/DL (ref 32–36)
MCV RBC AUTO: 83 FL (ref 82–98)
MONOCYTES # BLD AUTO: 0.6 K/UL (ref 0.3–1)
MONOCYTES NFR BLD: 8 % (ref 4–15)
NEUTROPHILS # BLD AUTO: 5.2 K/UL (ref 1.8–7.7)
NEUTROPHILS NFR BLD: 68 % (ref 38–73)
NITRITE, POC UA: NORMAL
NRBC BLD-RTO: 0 /100 WBC
PH, POC UA: 1.01
PLATELET # BLD AUTO: 280 K/UL (ref 150–450)
PMV BLD AUTO: 11.1 FL (ref 9.2–12.9)
POTASSIUM SERPL-SCNC: 3.7 MMOL/L (ref 3.5–5.1)
PROT SERPL-MCNC: 6.5 G/DL (ref 6–8.4)
PROT UR-MCNC: 10 MG/DL (ref 0–15)
PROT/CREAT UR: 0.16 MG/G{CREAT} (ref 0–0.2)
PROTEIN, POC: NORMAL
RBC # BLD AUTO: 3.31 M/UL (ref 4–5.4)
SARS-COV-2 RDRP RESP QL NAA+PROBE: NEGATIVE
SODIUM SERPL-SCNC: 134 MMOL/L (ref 136–145)
SPECIFIC GRAVITY, POC UA: NORMAL
UROBILINOGEN, POC UA: NORMAL
WBC # BLD AUTO: 7.67 K/UL (ref 3.9–12.7)

## 2021-10-21 PROCEDURE — 99285 EMERGENCY DEPT VISIT HI MDM: CPT | Mod: 25,27

## 2021-10-21 PROCEDURE — U0002 COVID-19 LAB TEST NON-CDC: HCPCS

## 2021-10-21 PROCEDURE — 99213 OFFICE O/P EST LOW 20 MIN: CPT | Mod: TH,S$PBB,, | Performed by: OBSTETRICS & GYNECOLOGY

## 2021-10-21 PROCEDURE — 99213 PR OFFICE/OUTPT VISIT, EST, LEVL III, 20-29 MIN: ICD-10-PCS | Mod: TH,S$PBB,, | Performed by: OBSTETRICS & GYNECOLOGY

## 2021-10-21 PROCEDURE — 99212 OFFICE O/P EST SF 10 MIN: CPT | Mod: PBBFAC,TH,PN | Performed by: OBSTETRICS & GYNECOLOGY

## 2021-10-21 PROCEDURE — 99284 PR EMERGENCY DEPT VISIT,LEVEL IV: ICD-10-PCS | Mod: 25,,, | Performed by: OBSTETRICS & GYNECOLOGY

## 2021-10-21 PROCEDURE — 99999 PR PBB SHADOW E&M-EST. PATIENT-LVL II: CPT | Mod: PBBFAC,,, | Performed by: OBSTETRICS & GYNECOLOGY

## 2021-10-21 PROCEDURE — 99999 PR PBB SHADOW E&M-EST. PATIENT-LVL II: ICD-10-PCS | Mod: PBBFAC,,, | Performed by: OBSTETRICS & GYNECOLOGY

## 2021-10-21 PROCEDURE — 85025 COMPLETE CBC W/AUTO DIFF WBC: CPT

## 2021-10-21 PROCEDURE — 59025 PR FETAL 2N-STRESS TEST: ICD-10-PCS | Mod: 26,,, | Performed by: OBSTETRICS & GYNECOLOGY

## 2021-10-21 PROCEDURE — 25000003 PHARM REV CODE 250

## 2021-10-21 PROCEDURE — 86900 BLOOD TYPING SEROLOGIC ABO: CPT | Performed by: STUDENT IN AN ORGANIZED HEALTH CARE EDUCATION/TRAINING PROGRAM

## 2021-10-21 PROCEDURE — 81002 URINALYSIS NONAUTO W/O SCOPE: CPT

## 2021-10-21 PROCEDURE — 99284 EMERGENCY DEPT VISIT MOD MDM: CPT | Mod: 25,,, | Performed by: OBSTETRICS & GYNECOLOGY

## 2021-10-21 PROCEDURE — 80053 COMPREHEN METABOLIC PANEL: CPT

## 2021-10-21 PROCEDURE — 86920 COMPATIBILITY TEST SPIN: CPT | Performed by: STUDENT IN AN ORGANIZED HEALTH CARE EDUCATION/TRAINING PROGRAM

## 2021-10-21 PROCEDURE — 59025 FETAL NON-STRESS TEST: CPT | Mod: 26,,, | Performed by: OBSTETRICS & GYNECOLOGY

## 2021-10-21 PROCEDURE — 82570 ASSAY OF URINE CREATININE: CPT

## 2021-10-21 PROCEDURE — 11000001 HC ACUTE MED/SURG PRIVATE ROOM

## 2021-10-21 RX ORDER — SODIUM CITRATE AND CITRIC ACID MONOHYDRATE 334; 500 MG/5ML; MG/5ML
30 SOLUTION ORAL
Status: DISCONTINUED | OUTPATIENT
Start: 2021-10-21 | End: 2021-10-22

## 2021-10-21 RX ORDER — SODIUM CHLORIDE, SODIUM LACTATE, POTASSIUM CHLORIDE, CALCIUM CHLORIDE 600; 310; 30; 20 MG/100ML; MG/100ML; MG/100ML; MG/100ML
INJECTION, SOLUTION INTRAVENOUS CONTINUOUS
Status: DISCONTINUED | OUTPATIENT
Start: 2021-10-21 | End: 2021-10-22

## 2021-10-21 RX ORDER — LIDOCAINE HYDROCHLORIDE 10 MG/ML
INJECTION INFILTRATION; PERINEURAL
Status: DISPENSED
Start: 2021-10-21 | End: 2021-10-22

## 2021-10-21 RX ORDER — CEFAZOLIN SODIUM 2 G/50ML
2 SOLUTION INTRAVENOUS
Status: DISCONTINUED | OUTPATIENT
Start: 2021-10-21 | End: 2021-10-22

## 2021-10-21 RX ORDER — ACETAMINOPHEN 500 MG
1000 TABLET ORAL
Status: DISCONTINUED | OUTPATIENT
Start: 2021-10-21 | End: 2021-10-22

## 2021-10-21 RX ORDER — OXYTOCIN/RINGER'S LACTATE 30/500 ML
95 PLASTIC BAG, INJECTION (ML) INTRAVENOUS CONTINUOUS
Status: CANCELLED | OUTPATIENT
Start: 2021-10-21

## 2021-10-21 RX ORDER — FAMOTIDINE 10 MG/ML
20 INJECTION INTRAVENOUS
Status: DISCONTINUED | OUTPATIENT
Start: 2021-10-21 | End: 2021-10-22

## 2021-10-21 RX ORDER — BUTALBITAL, ACETAMINOPHEN AND CAFFEINE 50; 325; 40 MG/1; MG/1; MG/1
1 TABLET ORAL EVERY 4 HOURS PRN
Status: DISCONTINUED | OUTPATIENT
Start: 2021-10-21 | End: 2021-10-22

## 2021-10-21 RX ADMIN — BUTALBITAL, ACETAMINOPHEN, AND CAFFEINE 1 TABLET: 50; 325; 40 TABLET ORAL at 06:10

## 2021-10-22 ENCOUNTER — ANESTHESIA EVENT (OUTPATIENT)
Dept: OBSTETRICS AND GYNECOLOGY | Facility: OTHER | Age: 31
End: 2021-10-22
Payer: MEDICAID

## 2021-10-22 ENCOUNTER — ANESTHESIA (OUTPATIENT)
Dept: OBSTETRICS AND GYNECOLOGY | Facility: OTHER | Age: 31
End: 2021-10-22
Payer: MEDICAID

## 2021-10-22 PROCEDURE — 59514 PR CESAREAN DELIVERY ONLY: ICD-10-PCS | Mod: GB,,, | Performed by: OBSTETRICS & GYNECOLOGY

## 2021-10-22 PROCEDURE — 01968 PR INSERT CATH,ART,PERCUT,SHORTTERM: ICD-10-PCS | Mod: AA,,, | Performed by: ANESTHESIOLOGY

## 2021-10-22 PROCEDURE — 36004724 HC OB OR TIME LEV III - 1ST 15 MIN: Performed by: OBSTETRICS & GYNECOLOGY

## 2021-10-22 PROCEDURE — 59200 INSERT CERVICAL DILATOR: CPT

## 2021-10-22 PROCEDURE — 37000008 HC ANESTHESIA 1ST 15 MINUTES: Performed by: OBSTETRICS & GYNECOLOGY

## 2021-10-22 PROCEDURE — 25000003 PHARM REV CODE 250: Performed by: STUDENT IN AN ORGANIZED HEALTH CARE EDUCATION/TRAINING PROGRAM

## 2021-10-22 PROCEDURE — 01968 ANES/ANALG CS DLVR NEURAXIAL: CPT | Mod: AA,,, | Performed by: ANESTHESIOLOGY

## 2021-10-22 PROCEDURE — 63600175 PHARM REV CODE 636 W HCPCS: Performed by: STUDENT IN AN ORGANIZED HEALTH CARE EDUCATION/TRAINING PROGRAM

## 2021-10-22 PROCEDURE — 71000033 HC RECOVERY, INTIAL HOUR: Performed by: OBSTETRICS & GYNECOLOGY

## 2021-10-22 PROCEDURE — C1751 CATH, INF, PER/CENT/MIDLINE: HCPCS | Performed by: ANESTHESIOLOGY

## 2021-10-22 PROCEDURE — 36004725 HC OB OR TIME LEV III - EA ADD 15 MIN: Performed by: OBSTETRICS & GYNECOLOGY

## 2021-10-22 PROCEDURE — 59514 PRA REAN DELIVERY ONLY: ICD-10-PCS | Mod: AA,,, | Performed by: ANESTHESIOLOGY

## 2021-10-22 PROCEDURE — 59514 CESAREAN DELIVERY ONLY: CPT | Mod: GB,,, | Performed by: OBSTETRICS & GYNECOLOGY

## 2021-10-22 PROCEDURE — 37000009 HC ANESTHESIA EA ADD 15 MINS: Performed by: OBSTETRICS & GYNECOLOGY

## 2021-10-22 PROCEDURE — 71000039 HC RECOVERY, EACH ADD'L HOUR: Performed by: OBSTETRICS & GYNECOLOGY

## 2021-10-22 PROCEDURE — 27200710 HC EPIDURAL INFUSION PUMP SET: Performed by: ANESTHESIOLOGY

## 2021-10-22 PROCEDURE — 11000001 HC ACUTE MED/SURG PRIVATE ROOM

## 2021-10-22 PROCEDURE — 62326 NJX INTERLAMINAR LMBR/SAC: CPT | Performed by: STUDENT IN AN ORGANIZED HEALTH CARE EDUCATION/TRAINING PROGRAM

## 2021-10-22 PROCEDURE — 59514 CESAREAN DELIVERY ONLY: CPT | Mod: AA,,, | Performed by: ANESTHESIOLOGY

## 2021-10-22 RX ORDER — SODIUM CITRATE AND CITRIC ACID MONOHYDRATE 334; 500 MG/5ML; MG/5ML
30 SOLUTION ORAL ONCE
Status: COMPLETED | OUTPATIENT
Start: 2021-10-22 | End: 2021-10-22

## 2021-10-22 RX ORDER — SIMETHICONE 80 MG
1 TABLET,CHEWABLE ORAL EVERY 6 HOURS PRN
Status: DISCONTINUED | OUTPATIENT
Start: 2021-10-22 | End: 2021-10-25 | Stop reason: HOSPADM

## 2021-10-22 RX ORDER — KETOROLAC TROMETHAMINE 30 MG/ML
30 INJECTION, SOLUTION INTRAMUSCULAR; INTRAVENOUS
Status: COMPLETED | OUTPATIENT
Start: 2021-10-23 | End: 2021-10-23

## 2021-10-22 RX ORDER — SIMETHICONE 80 MG
1 TABLET,CHEWABLE ORAL 4 TIMES DAILY PRN
Status: DISCONTINUED | OUTPATIENT
Start: 2021-10-22 | End: 2021-10-22

## 2021-10-22 RX ORDER — PRENATAL WITH FERROUS FUM AND FOLIC ACID 3080; 920; 120; 400; 22; 1.84; 3; 20; 10; 1; 12; 200; 27; 25; 2 [IU]/1; [IU]/1; MG/1; [IU]/1; MG/1; MG/1; MG/1; MG/1; MG/1; MG/1; UG/1; MG/1; MG/1; MG/1; MG/1
1 TABLET ORAL DAILY
Status: DISCONTINUED | OUTPATIENT
Start: 2021-10-23 | End: 2021-10-25 | Stop reason: HOSPADM

## 2021-10-22 RX ORDER — BUPIVACAINE HYDROCHLORIDE 2.5 MG/ML
INJECTION, SOLUTION EPIDURAL; INFILTRATION; INTRACAUDAL
Status: DISPENSED
Start: 2021-10-22 | End: 2021-10-22

## 2021-10-22 RX ORDER — CARBOPROST TROMETHAMINE 250 UG/ML
250 INJECTION, SOLUTION INTRAMUSCULAR
Status: DISCONTINUED | OUTPATIENT
Start: 2021-10-22 | End: 2021-10-22

## 2021-10-22 RX ORDER — MISOPROSTOL 200 UG/1
800 TABLET ORAL ONCE AS NEEDED
Status: DISCONTINUED | OUTPATIENT
Start: 2021-10-22 | End: 2021-10-25 | Stop reason: HOSPADM

## 2021-10-22 RX ORDER — ONDANSETRON 8 MG/1
8 TABLET, ORALLY DISINTEGRATING ORAL EVERY 8 HOURS PRN
Status: DISCONTINUED | OUTPATIENT
Start: 2021-10-22 | End: 2021-10-25 | Stop reason: HOSPADM

## 2021-10-22 RX ORDER — DOCUSATE SODIUM 100 MG/1
200 CAPSULE, LIQUID FILLED ORAL 2 TIMES DAILY
Status: DISCONTINUED | OUTPATIENT
Start: 2021-10-22 | End: 2021-10-25 | Stop reason: HOSPADM

## 2021-10-22 RX ORDER — TRANEXAMIC ACID 100 MG/ML
1000 INJECTION, SOLUTION INTRAVENOUS ONCE AS NEEDED
Status: DISCONTINUED | OUTPATIENT
Start: 2021-10-22 | End: 2021-10-22

## 2021-10-22 RX ORDER — ONDANSETRON 2 MG/ML
4 INJECTION INTRAMUSCULAR; INTRAVENOUS EVERY 6 HOURS PRN
Status: DISCONTINUED | OUTPATIENT
Start: 2021-10-22 | End: 2021-10-22

## 2021-10-22 RX ORDER — OXYTOCIN/RINGER'S LACTATE 30/500 ML
95 PLASTIC BAG, INJECTION (ML) INTRAVENOUS ONCE
Status: COMPLETED | OUTPATIENT
Start: 2021-10-22 | End: 2021-10-22

## 2021-10-22 RX ORDER — SODIUM CHLORIDE, SODIUM LACTATE, POTASSIUM CHLORIDE, CALCIUM CHLORIDE 600; 310; 30; 20 MG/100ML; MG/100ML; MG/100ML; MG/100ML
INJECTION, SOLUTION INTRAVENOUS CONTINUOUS PRN
Status: DISCONTINUED | OUTPATIENT
Start: 2021-10-22 | End: 2021-10-22

## 2021-10-22 RX ORDER — CALCIUM CARBONATE 200(500)MG
500 TABLET,CHEWABLE ORAL 3 TIMES DAILY PRN
Status: DISCONTINUED | OUTPATIENT
Start: 2021-10-22 | End: 2021-10-25 | Stop reason: HOSPADM

## 2021-10-22 RX ORDER — DIPHENOXYLATE HYDROCHLORIDE AND ATROPINE SULFATE 2.5; .025 MG/1; MG/1
1 TABLET ORAL 4 TIMES DAILY PRN
Status: DISCONTINUED | OUTPATIENT
Start: 2021-10-22 | End: 2021-10-25 | Stop reason: HOSPADM

## 2021-10-22 RX ORDER — ONDANSETRON HYDROCHLORIDE 2 MG/ML
INJECTION, SOLUTION INTRAMUSCULAR; INTRAVENOUS
Status: DISCONTINUED | OUTPATIENT
Start: 2021-10-22 | End: 2021-10-22

## 2021-10-22 RX ORDER — HYDROCODONE BITARTRATE AND ACETAMINOPHEN 500; 5 MG/1; MG/1
TABLET ORAL
Status: DISCONTINUED | OUTPATIENT
Start: 2021-10-22 | End: 2021-10-25 | Stop reason: HOSPADM

## 2021-10-22 RX ORDER — DIPHENHYDRAMINE HYDROCHLORIDE 50 MG/ML
12.5 INJECTION INTRAMUSCULAR; INTRAVENOUS
Status: DISCONTINUED | OUTPATIENT
Start: 2021-10-22 | End: 2021-10-25 | Stop reason: HOSPADM

## 2021-10-22 RX ORDER — PROCHLORPERAZINE EDISYLATE 5 MG/ML
5 INJECTION INTRAMUSCULAR; INTRAVENOUS EVERY 6 HOURS PRN
Status: DISCONTINUED | OUTPATIENT
Start: 2021-10-22 | End: 2021-10-22

## 2021-10-22 RX ORDER — FENTANYL CITRATE 50 UG/ML
INJECTION, SOLUTION INTRAMUSCULAR; INTRAVENOUS
Status: COMPLETED
Start: 2021-10-22 | End: 2021-10-22

## 2021-10-22 RX ORDER — OXYTOCIN 10 [USP'U]/ML
INJECTION, SOLUTION INTRAMUSCULAR; INTRAVENOUS
Status: DISCONTINUED | OUTPATIENT
Start: 2021-10-22 | End: 2021-10-22

## 2021-10-22 RX ORDER — MORPHINE SULFATE 0.5 MG/ML
INJECTION, SOLUTION EPIDURAL; INTRATHECAL; INTRAVENOUS
Status: DISCONTINUED | OUTPATIENT
Start: 2021-10-22 | End: 2021-10-22

## 2021-10-22 RX ORDER — LIDOCAINE HYDROCHLORIDE AND EPINEPHRINE 15; 5 MG/ML; UG/ML
INJECTION, SOLUTION EPIDURAL
Status: DISCONTINUED | OUTPATIENT
Start: 2021-10-22 | End: 2021-10-22

## 2021-10-22 RX ORDER — ACETAMINOPHEN 325 MG/1
650 TABLET ORAL EVERY 6 HOURS
Status: DISCONTINUED | OUTPATIENT
Start: 2021-10-22 | End: 2021-10-22

## 2021-10-22 RX ORDER — NALBUPHINE HYDROCHLORIDE 10 MG/ML
5 INJECTION, SOLUTION INTRAMUSCULAR; INTRAVENOUS; SUBCUTANEOUS ONCE AS NEEDED
Status: DISCONTINUED | OUTPATIENT
Start: 2021-10-22 | End: 2021-10-25 | Stop reason: HOSPADM

## 2021-10-22 RX ORDER — METHYLERGONOVINE MALEATE 0.2 MG/ML
200 INJECTION INTRAVENOUS ONCE AS NEEDED
Status: DISCONTINUED | OUTPATIENT
Start: 2021-10-22 | End: 2021-10-25 | Stop reason: HOSPADM

## 2021-10-22 RX ORDER — MISOPROSTOL 200 UG/1
800 TABLET ORAL
Status: DISCONTINUED | OUTPATIENT
Start: 2021-10-22 | End: 2021-10-22

## 2021-10-22 RX ORDER — LIDOCAINE HCL/EPINEPHRINE/PF 2%-1:200K
VIAL (ML) INJECTION
Status: DISCONTINUED | OUTPATIENT
Start: 2021-10-22 | End: 2021-10-22

## 2021-10-22 RX ORDER — METHYLERGONOVINE MALEATE 0.2 MG/ML
200 INJECTION INTRAVENOUS
Status: DISCONTINUED | OUTPATIENT
Start: 2021-10-22 | End: 2021-10-22

## 2021-10-22 RX ORDER — OXYCODONE HYDROCHLORIDE 5 MG/1
5 TABLET ORAL EVERY 4 HOURS PRN
Status: DISPENSED | OUTPATIENT
Start: 2021-10-22 | End: 2021-10-23

## 2021-10-22 RX ORDER — FAMOTIDINE 10 MG/ML
20 INJECTION INTRAVENOUS ONCE
Status: COMPLETED | OUTPATIENT
Start: 2021-10-22 | End: 2021-10-22

## 2021-10-22 RX ORDER — DIPHENHYDRAMINE HCL 25 MG
25 CAPSULE ORAL EVERY 6 HOURS PRN
Status: DISCONTINUED | OUTPATIENT
Start: 2021-10-22 | End: 2021-10-25 | Stop reason: HOSPADM

## 2021-10-22 RX ORDER — ONDANSETRON 8 MG/1
8 TABLET, ORALLY DISINTEGRATING ORAL EVERY 8 HOURS PRN
Status: DISCONTINUED | OUTPATIENT
Start: 2021-10-22 | End: 2021-10-24

## 2021-10-22 RX ORDER — OXYCODONE HYDROCHLORIDE 5 MG/1
10 TABLET ORAL EVERY 4 HOURS PRN
Status: DISCONTINUED | OUTPATIENT
Start: 2021-10-22 | End: 2021-10-25 | Stop reason: HOSPADM

## 2021-10-22 RX ORDER — ACETAMINOPHEN 325 MG/1
650 TABLET ORAL
Status: DISCONTINUED | OUTPATIENT
Start: 2021-10-22 | End: 2021-10-25 | Stop reason: HOSPADM

## 2021-10-22 RX ORDER — TRANEXAMIC ACID 100 MG/ML
1000 INJECTION, SOLUTION INTRAVENOUS ONCE AS NEEDED
Status: DISCONTINUED | OUTPATIENT
Start: 2021-10-22 | End: 2021-10-25 | Stop reason: HOSPADM

## 2021-10-22 RX ORDER — FENTANYL CITRATE 50 UG/ML
INJECTION, SOLUTION INTRAMUSCULAR; INTRAVENOUS
Status: DISCONTINUED | OUTPATIENT
Start: 2021-10-22 | End: 2021-10-22

## 2021-10-22 RX ORDER — ONDANSETRON 2 MG/ML
4 INJECTION INTRAMUSCULAR; INTRAVENOUS EVERY 6 HOURS PRN
Status: DISCONTINUED | OUTPATIENT
Start: 2021-10-22 | End: 2021-10-25 | Stop reason: HOSPADM

## 2021-10-22 RX ORDER — OXYTOCIN/RINGER'S LACTATE 30/500 ML
0-30 PLASTIC BAG, INJECTION (ML) INTRAVENOUS CONTINUOUS
Status: DISCONTINUED | OUTPATIENT
Start: 2021-10-22 | End: 2021-10-25 | Stop reason: HOSPADM

## 2021-10-22 RX ORDER — ACETAMINOPHEN 10 MG/ML
INJECTION, SOLUTION INTRAVENOUS
Status: DISCONTINUED | OUTPATIENT
Start: 2021-10-22 | End: 2021-10-22

## 2021-10-22 RX ORDER — OXYCODONE HYDROCHLORIDE 5 MG/1
10 TABLET ORAL EVERY 4 HOURS PRN
Status: DISPENSED | OUTPATIENT
Start: 2021-10-22 | End: 2021-10-23

## 2021-10-22 RX ORDER — HYDROCORTISONE 25 MG/G
CREAM TOPICAL 3 TIMES DAILY PRN
Status: DISCONTINUED | OUTPATIENT
Start: 2021-10-22 | End: 2021-10-25 | Stop reason: HOSPADM

## 2021-10-22 RX ORDER — OXYTOCIN/RINGER'S LACTATE 30/500 ML
334 PLASTIC BAG, INJECTION (ML) INTRAVENOUS ONCE
Status: DISCONTINUED | OUTPATIENT
Start: 2021-10-22 | End: 2021-10-24

## 2021-10-22 RX ORDER — FENTANYL/BUPIVACAINE/NS/PF 2MCG/ML-.1
PLASTIC BAG, INJECTION (ML) INJECTION CONTINUOUS
Status: DISCONTINUED | OUTPATIENT
Start: 2021-10-22 | End: 2021-10-24

## 2021-10-22 RX ORDER — KETOROLAC TROMETHAMINE 30 MG/ML
30 INJECTION, SOLUTION INTRAMUSCULAR; INTRAVENOUS EVERY 6 HOURS
Status: DISCONTINUED | OUTPATIENT
Start: 2021-10-22 | End: 2021-10-22

## 2021-10-22 RX ORDER — OXYCODONE HYDROCHLORIDE 5 MG/1
5 TABLET ORAL EVERY 4 HOURS PRN
Status: DISCONTINUED | OUTPATIENT
Start: 2021-10-22 | End: 2021-10-25 | Stop reason: HOSPADM

## 2021-10-22 RX ORDER — SODIUM CHLORIDE 9 MG/ML
INJECTION, SOLUTION INTRAVENOUS
Status: DISCONTINUED | OUTPATIENT
Start: 2021-10-22 | End: 2021-10-24

## 2021-10-22 RX ORDER — IBUPROFEN 400 MG/1
800 TABLET ORAL
Status: DISCONTINUED | OUTPATIENT
Start: 2021-10-23 | End: 2021-10-25 | Stop reason: HOSPADM

## 2021-10-22 RX ORDER — CARBOPROST TROMETHAMINE 250 UG/ML
250 INJECTION, SOLUTION INTRAMUSCULAR
Status: DISCONTINUED | OUTPATIENT
Start: 2021-10-22 | End: 2021-10-25 | Stop reason: HOSPADM

## 2021-10-22 RX ORDER — AMOXICILLIN 250 MG
1 CAPSULE ORAL NIGHTLY PRN
Status: DISCONTINUED | OUTPATIENT
Start: 2021-10-22 | End: 2021-10-25 | Stop reason: HOSPADM

## 2021-10-22 RX ORDER — FENTANYL/BUPIVACAINE/NS/PF 2MCG/ML-.1
PLASTIC BAG, INJECTION (ML) INJECTION CONTINUOUS PRN
Status: DISCONTINUED | OUTPATIENT
Start: 2021-10-22 | End: 2021-10-22

## 2021-10-22 RX ORDER — SODIUM CHLORIDE, SODIUM LACTATE, POTASSIUM CHLORIDE, CALCIUM CHLORIDE 600; 310; 30; 20 MG/100ML; MG/100ML; MG/100ML; MG/100ML
INJECTION, SOLUTION INTRAVENOUS CONTINUOUS
Status: DISCONTINUED | OUTPATIENT
Start: 2021-10-22 | End: 2021-10-25 | Stop reason: HOSPADM

## 2021-10-22 RX ORDER — FENTANYL/BUPIVACAINE/NS/PF 2MCG/ML-.1
PLASTIC BAG, INJECTION (ML) INJECTION
Status: COMPLETED
Start: 2021-10-22 | End: 2021-10-22

## 2021-10-22 RX ORDER — CEFAZOLIN SODIUM 2 G/50ML
2 SOLUTION INTRAVENOUS ONCE AS NEEDED
Status: DISCONTINUED | OUTPATIENT
Start: 2021-10-22 | End: 2021-10-24

## 2021-10-22 RX ORDER — SODIUM CHLORIDE, SODIUM LACTATE, POTASSIUM CHLORIDE, CALCIUM CHLORIDE 600; 310; 30; 20 MG/100ML; MG/100ML; MG/100ML; MG/100ML
INJECTION, SOLUTION INTRAVENOUS CONTINUOUS
Status: DISCONTINUED | OUTPATIENT
Start: 2021-10-22 | End: 2021-10-24

## 2021-10-22 RX ORDER — PROCHLORPERAZINE EDISYLATE 5 MG/ML
5 INJECTION INTRAMUSCULAR; INTRAVENOUS EVERY 6 HOURS PRN
Status: DISCONTINUED | OUTPATIENT
Start: 2021-10-22 | End: 2021-10-25 | Stop reason: HOSPADM

## 2021-10-22 RX ADMIN — FENTANYL CITRATE 100 MCG: 50 INJECTION, SOLUTION INTRAMUSCULAR; INTRAVENOUS at 09:10

## 2021-10-22 RX ADMIN — Medication 10 ML/HR: at 09:10

## 2021-10-22 RX ADMIN — OXYTOCIN 3 UNITS: 10 INJECTION, SOLUTION INTRAMUSCULAR; INTRAVENOUS at 04:10

## 2021-10-22 RX ADMIN — SODIUM CITRATE AND CITRIC ACID MONOHYDRATE 30 ML: 500; 334 SOLUTION ORAL at 03:10

## 2021-10-22 RX ADMIN — LIDOCAINE HYDROCHLORIDE,EPINEPHRINE BITARTRATE 5 MG: 20; .005 INJECTION, SOLUTION EPIDURAL; INFILTRATION; INTRACAUDAL; PERINEURAL at 04:10

## 2021-10-22 RX ADMIN — DEXTROSE 3 MILLION UNITS: 50 INJECTION, SOLUTION INTRAVENOUS at 08:10

## 2021-10-22 RX ADMIN — Medication 5 ML: at 09:10

## 2021-10-22 RX ADMIN — ONDANSETRON 4 MG: 2 INJECTION, SOLUTION INTRAMUSCULAR; INTRAVENOUS at 04:10

## 2021-10-22 RX ADMIN — SODIUM CHLORIDE, SODIUM LACTATE, POTASSIUM CHLORIDE, AND CALCIUM CHLORIDE: 600; 310; 30; 20 INJECTION, SOLUTION INTRAVENOUS at 09:10

## 2021-10-22 RX ADMIN — DOCUSATE SODIUM 200 MG: 100 CAPSULE, LIQUID FILLED ORAL at 11:10

## 2021-10-22 RX ADMIN — FENTANYL CITRATE 100 MCG: 50 INJECTION, SOLUTION INTRAMUSCULAR; INTRAVENOUS at 04:10

## 2021-10-22 RX ADMIN — DEXTROSE 2 G: 50 INJECTION, SOLUTION INTRAVENOUS at 04:10

## 2021-10-22 RX ADMIN — Medication 1 MG: at 04:10

## 2021-10-22 RX ADMIN — OXYCODONE 10 MG: 5 TABLET ORAL at 06:10

## 2021-10-22 RX ADMIN — FAMOTIDINE 20 MG: 10 INJECTION INTRAVENOUS at 03:10

## 2021-10-22 RX ADMIN — DEXTROSE 5 MILLION UNITS: 50 INJECTION, SOLUTION INTRAVENOUS at 03:10

## 2021-10-22 RX ADMIN — KETOROLAC TROMETHAMINE 30 MG: 30 INJECTION, SOLUTION INTRAMUSCULAR at 05:10

## 2021-10-22 RX ADMIN — Medication 2 MILLI-UNITS/MIN: at 03:10

## 2021-10-22 RX ADMIN — DIPHENHYDRAMINE HYDROCHLORIDE 12.5 MG: 50 INJECTION, SOLUTION INTRAMUSCULAR; INTRAVENOUS at 09:10

## 2021-10-22 RX ADMIN — KETOROLAC TROMETHAMINE 30 MG: 30 INJECTION, SOLUTION INTRAMUSCULAR; INTRAVENOUS at 11:10

## 2021-10-22 RX ADMIN — Medication 95 MILLI-UNITS/MIN: at 05:10

## 2021-10-22 RX ADMIN — ACETAMINOPHEN 1000 MG: 10 INJECTION INTRAVENOUS at 04:10

## 2021-10-22 RX ADMIN — SODIUM CHLORIDE, SODIUM LACTATE, POTASSIUM CHLORIDE, AND CALCIUM CHLORIDE: .6; .31; .03; .02 INJECTION, SOLUTION INTRAVENOUS at 03:10

## 2021-10-22 RX ADMIN — ACETAMINOPHEN 650 MG: 325 TABLET, FILM COATED ORAL at 11:10

## 2021-10-22 RX ADMIN — LIDOCAINE HYDROCHLORIDE,EPINEPHRINE BITARTRATE 3 ML: 15; .005 INJECTION, SOLUTION EPIDURAL; INFILTRATION; INTRACAUDAL; PERINEURAL at 09:10

## 2021-10-22 RX ADMIN — Medication 1.5 MG: at 04:10

## 2021-10-22 RX ADMIN — DEXTROSE 3 MILLION UNITS: 50 INJECTION, SOLUTION INTRAVENOUS at 12:10

## 2021-10-23 LAB
ABO + RH BLD: NORMAL
BASOPHILS # BLD AUTO: 0.03 K/UL (ref 0–0.2)
BASOPHILS NFR BLD: 0.2 % (ref 0–1.9)
BLD GP AB SCN CELLS X3 SERPL QL: NORMAL
DIFFERENTIAL METHOD: ABNORMAL
EOSINOPHIL # BLD AUTO: 0.1 K/UL (ref 0–0.5)
EOSINOPHIL NFR BLD: 0.4 % (ref 0–8)
ERYTHROCYTE [DISTWIDTH] IN BLOOD BY AUTOMATED COUNT: 16.9 % (ref 11.5–14.5)
FETAL CELL SCN BLD QL ROSETTE: NORMAL
HCT VFR BLD AUTO: 26.4 % (ref 37–48.5)
HGB BLD-MCNC: 8.6 G/DL (ref 12–16)
IMM GRANULOCYTES # BLD AUTO: 0.05 K/UL (ref 0–0.04)
IMM GRANULOCYTES NFR BLD AUTO: 0.4 % (ref 0–0.5)
LYMPHOCYTES # BLD AUTO: 1 K/UL (ref 1–4.8)
LYMPHOCYTES NFR BLD: 7.9 % (ref 18–48)
MCH RBC QN AUTO: 26.8 PG (ref 27–31)
MCHC RBC AUTO-ENTMCNC: 32.6 G/DL (ref 32–36)
MCV RBC AUTO: 82 FL (ref 82–98)
MONOCYTES # BLD AUTO: 0.8 K/UL (ref 0.3–1)
MONOCYTES NFR BLD: 6.7 % (ref 4–15)
NEUTROPHILS # BLD AUTO: 10.4 K/UL (ref 1.8–7.7)
NEUTROPHILS NFR BLD: 84.4 % (ref 38–73)
NRBC BLD-RTO: 0 /100 WBC
PLATELET # BLD AUTO: 260 K/UL (ref 150–450)
PMV BLD AUTO: 11.2 FL (ref 9.2–12.9)
RBC # BLD AUTO: 3.21 M/UL (ref 4–5.4)
WBC # BLD AUTO: 12.31 K/UL (ref 3.9–12.7)

## 2021-10-23 PROCEDURE — 85461 HEMOGLOBIN FETAL: CPT | Performed by: OBSTETRICS & GYNECOLOGY

## 2021-10-23 PROCEDURE — 99232 SBSQ HOSP IP/OBS MODERATE 35: CPT | Mod: ,,, | Performed by: OBSTETRICS & GYNECOLOGY

## 2021-10-23 PROCEDURE — 25000003 PHARM REV CODE 250: Performed by: STUDENT IN AN ORGANIZED HEALTH CARE EDUCATION/TRAINING PROGRAM

## 2021-10-23 PROCEDURE — 63600175 PHARM REV CODE 636 W HCPCS: Performed by: STUDENT IN AN ORGANIZED HEALTH CARE EDUCATION/TRAINING PROGRAM

## 2021-10-23 PROCEDURE — 11000001 HC ACUTE MED/SURG PRIVATE ROOM

## 2021-10-23 PROCEDURE — 86900 BLOOD TYPING SEROLOGIC ABO: CPT | Performed by: OBSTETRICS & GYNECOLOGY

## 2021-10-23 PROCEDURE — 85025 COMPLETE CBC W/AUTO DIFF WBC: CPT | Performed by: STUDENT IN AN ORGANIZED HEALTH CARE EDUCATION/TRAINING PROGRAM

## 2021-10-23 PROCEDURE — 36415 COLL VENOUS BLD VENIPUNCTURE: CPT | Performed by: STUDENT IN AN ORGANIZED HEALTH CARE EDUCATION/TRAINING PROGRAM

## 2021-10-23 PROCEDURE — 36415 COLL VENOUS BLD VENIPUNCTURE: CPT | Performed by: OBSTETRICS & GYNECOLOGY

## 2021-10-23 PROCEDURE — 99232 PR SUBSEQUENT HOSPITAL CARE,LEVL II: ICD-10-PCS | Mod: ,,, | Performed by: OBSTETRICS & GYNECOLOGY

## 2021-10-23 RX ADMIN — OXYCODONE 5 MG: 5 TABLET ORAL at 06:10

## 2021-10-23 RX ADMIN — ACETAMINOPHEN 650 MG: 325 TABLET, FILM COATED ORAL at 05:10

## 2021-10-23 RX ADMIN — ACETAMINOPHEN 650 MG: 325 TABLET, FILM COATED ORAL at 11:10

## 2021-10-23 RX ADMIN — OXYCODONE 10 MG: 5 TABLET ORAL at 08:10

## 2021-10-23 RX ADMIN — ACETAMINOPHEN 650 MG: 325 TABLET, FILM COATED ORAL at 12:10

## 2021-10-23 RX ADMIN — DOCUSATE SODIUM 200 MG: 100 CAPSULE, LIQUID FILLED ORAL at 08:10

## 2021-10-23 RX ADMIN — PRENATAL VIT W/ FE FUMARATE-FA TAB 27-0.8 MG 1 TABLET: 27-0.8 TAB at 08:10

## 2021-10-23 RX ADMIN — IBUPROFEN 800 MG: 400 TABLET ORAL at 06:10

## 2021-10-23 RX ADMIN — ACETAMINOPHEN 650 MG: 325 TABLET, FILM COATED ORAL at 06:10

## 2021-10-23 RX ADMIN — KETOROLAC TROMETHAMINE 30 MG: 30 INJECTION, SOLUTION INTRAMUSCULAR; INTRAVENOUS at 05:10

## 2021-10-23 RX ADMIN — OXYCODONE 5 MG: 5 TABLET ORAL at 10:10

## 2021-10-23 RX ADMIN — OXYCODONE 10 MG: 5 TABLET ORAL at 12:10

## 2021-10-23 RX ADMIN — DIMETHICONE 80 MG: 80 TABLET, CHEWABLE ORAL at 08:10

## 2021-10-23 RX ADMIN — KETOROLAC TROMETHAMINE 30 MG: 30 INJECTION, SOLUTION INTRAMUSCULAR; INTRAVENOUS at 12:10

## 2021-10-24 PROBLEM — Z98.891 S/P CESAREAN SECTION: Status: ACTIVE | Noted: 2021-10-24

## 2021-10-24 LAB — INJECT RH IG VOL PATIENT: NORMAL ML

## 2021-10-24 PROCEDURE — 99232 SBSQ HOSP IP/OBS MODERATE 35: CPT | Mod: ,,, | Performed by: STUDENT IN AN ORGANIZED HEALTH CARE EDUCATION/TRAINING PROGRAM

## 2021-10-24 PROCEDURE — 11000001 HC ACUTE MED/SURG PRIVATE ROOM

## 2021-10-24 PROCEDURE — 25000003 PHARM REV CODE 250: Performed by: STUDENT IN AN ORGANIZED HEALTH CARE EDUCATION/TRAINING PROGRAM

## 2021-10-24 PROCEDURE — 99232 PR SUBSEQUENT HOSPITAL CARE,LEVL II: ICD-10-PCS | Mod: ,,, | Performed by: STUDENT IN AN ORGANIZED HEALTH CARE EDUCATION/TRAINING PROGRAM

## 2021-10-24 PROCEDURE — 63600519 RHOGAM PHARM REV CODE 636 ALT 250 W HCPCS: Performed by: OBSTETRICS & GYNECOLOGY

## 2021-10-24 RX ORDER — IBUPROFEN 800 MG/1
800 TABLET ORAL EVERY 8 HOURS
Qty: 30 TABLET | Refills: 0 | Status: SHIPPED | OUTPATIENT
Start: 2021-10-25 | End: 2022-10-18

## 2021-10-24 RX ADMIN — DOCUSATE SODIUM 200 MG: 100 CAPSULE, LIQUID FILLED ORAL at 09:10

## 2021-10-24 RX ADMIN — PRENATAL VIT W/ FE FUMARATE-FA TAB 27-0.8 MG 1 TABLET: 27-0.8 TAB at 08:10

## 2021-10-24 RX ADMIN — ACETAMINOPHEN 650 MG: 325 TABLET, FILM COATED ORAL at 12:10

## 2021-10-24 RX ADMIN — IBUPROFEN 800 MG: 400 TABLET ORAL at 08:10

## 2021-10-24 RX ADMIN — ACETAMINOPHEN 650 MG: 325 TABLET, FILM COATED ORAL at 06:10

## 2021-10-24 RX ADMIN — OXYCODONE 10 MG: 5 TABLET ORAL at 11:10

## 2021-10-24 RX ADMIN — ACETAMINOPHEN 650 MG: 325 TABLET, FILM COATED ORAL at 05:10

## 2021-10-24 RX ADMIN — ACETAMINOPHEN 650 MG: 325 TABLET, FILM COATED ORAL at 11:10

## 2021-10-24 RX ADMIN — DIMETHICONE 80 MG: 80 TABLET, CHEWABLE ORAL at 08:10

## 2021-10-24 RX ADMIN — IBUPROFEN 800 MG: 400 TABLET ORAL at 02:10

## 2021-10-24 RX ADMIN — OXYCODONE 10 MG: 5 TABLET ORAL at 03:10

## 2021-10-24 RX ADMIN — DOCUSATE SODIUM 200 MG: 100 CAPSULE, LIQUID FILLED ORAL at 08:10

## 2021-10-24 RX ADMIN — OXYCODONE 10 MG: 5 TABLET ORAL at 06:10

## 2021-10-24 RX ADMIN — HUMAN RHO(D) IMMUNE GLOBULIN 300 MCG: 300 INJECTION, SOLUTION INTRAMUSCULAR at 09:10

## 2021-10-24 RX ADMIN — OXYCODONE 10 MG: 5 TABLET ORAL at 02:10

## 2021-10-24 RX ADMIN — IBUPROFEN 800 MG: 400 TABLET ORAL at 05:10

## 2021-10-25 VITALS
OXYGEN SATURATION: 100 % | HEIGHT: 67 IN | TEMPERATURE: 99 F | DIASTOLIC BLOOD PRESSURE: 84 MMHG | SYSTOLIC BLOOD PRESSURE: 138 MMHG | BODY MASS INDEX: 40.52 KG/M2 | WEIGHT: 258.19 LBS | RESPIRATION RATE: 16 BRPM | HEART RATE: 100 BPM

## 2021-10-25 LAB
BLD PROD TYP BPU: NORMAL
BLD PROD TYP BPU: NORMAL
BLOOD UNIT EXPIRATION DATE: NORMAL
BLOOD UNIT EXPIRATION DATE: NORMAL
BLOOD UNIT TYPE CODE: 9500
BLOOD UNIT TYPE CODE: 9500
BLOOD UNIT TYPE: NORMAL
BLOOD UNIT TYPE: NORMAL
CODING SYSTEM: NORMAL
CODING SYSTEM: NORMAL
DISPENSE STATUS: NORMAL
DISPENSE STATUS: NORMAL
NUM UNITS TRANS PACKED RBC: NORMAL
TRANS ERYTHROCYTES VOL PATIENT: NORMAL ML

## 2021-10-25 PROCEDURE — 25000003 PHARM REV CODE 250: Performed by: STUDENT IN AN ORGANIZED HEALTH CARE EDUCATION/TRAINING PROGRAM

## 2021-10-25 PROCEDURE — 99238 HOSP IP/OBS DSCHRG MGMT 30/<: CPT | Mod: ,,, | Performed by: OBSTETRICS & GYNECOLOGY

## 2021-10-25 PROCEDURE — 99238 PR HOSPITAL DISCHARGE DAY,<30 MIN: ICD-10-PCS | Mod: ,,, | Performed by: OBSTETRICS & GYNECOLOGY

## 2021-10-25 RX ORDER — NIFEDIPINE 30 MG/1
30 TABLET, EXTENDED RELEASE ORAL DAILY
Qty: 30 TABLET | Refills: 11 | Status: SHIPPED | OUTPATIENT
Start: 2021-10-25 | End: 2022-10-24 | Stop reason: ALTCHOICE

## 2021-10-25 RX ORDER — NIFEDIPINE 30 MG/1
30 TABLET, EXTENDED RELEASE ORAL DAILY
Status: DISCONTINUED | OUTPATIENT
Start: 2021-10-25 | End: 2021-10-25 | Stop reason: HOSPADM

## 2021-10-25 RX ORDER — HYDROCODONE BITARTRATE AND ACETAMINOPHEN 5; 325 MG/1; MG/1
1 TABLET ORAL EVERY 6 HOURS PRN
Qty: 20 TABLET | Refills: 0 | Status: SHIPPED | OUTPATIENT
Start: 2021-10-25 | End: 2022-10-24 | Stop reason: ALTCHOICE

## 2021-10-25 RX ADMIN — NIFEDIPINE 30 MG: 30 TABLET, FILM COATED, EXTENDED RELEASE ORAL at 08:10

## 2021-10-25 RX ADMIN — IBUPROFEN 800 MG: 400 TABLET ORAL at 10:10

## 2021-10-25 RX ADMIN — ACETAMINOPHEN 650 MG: 325 TABLET, FILM COATED ORAL at 05:10

## 2021-10-25 RX ADMIN — IBUPROFEN 800 MG: 400 TABLET ORAL at 02:10

## 2021-10-25 RX ADMIN — PRENATAL VIT W/ FE FUMARATE-FA TAB 27-0.8 MG 1 TABLET: 27-0.8 TAB at 08:10

## 2021-10-25 RX ADMIN — DOCUSATE SODIUM 200 MG: 100 CAPSULE, LIQUID FILLED ORAL at 08:10

## 2021-10-25 RX ADMIN — ACETAMINOPHEN 650 MG: 325 TABLET, FILM COATED ORAL at 10:10

## 2021-10-25 RX ADMIN — OXYCODONE 5 MG: 5 TABLET ORAL at 12:10

## 2021-10-29 ENCOUNTER — POSTPARTUM VISIT (OUTPATIENT)
Dept: OBSTETRICS AND GYNECOLOGY | Facility: CLINIC | Age: 31
End: 2021-10-29
Payer: MEDICAID

## 2021-10-29 VITALS
BODY MASS INDEX: 36.8 KG/M2 | HEIGHT: 68 IN | WEIGHT: 242.81 LBS | DIASTOLIC BLOOD PRESSURE: 90 MMHG | SYSTOLIC BLOOD PRESSURE: 138 MMHG

## 2021-10-29 DIAGNOSIS — Z01.30 BP CHECK: Primary | ICD-10-CM

## 2021-10-29 PROCEDURE — 99213 OFFICE O/P EST LOW 20 MIN: CPT | Mod: PBBFAC

## 2021-10-29 PROCEDURE — 99999 PR PBB SHADOW E&M-EST. PATIENT-LVL III: CPT | Mod: PBBFAC,,,

## 2021-10-29 PROCEDURE — 99213 PR OFFICE/OUTPT VISIT, EST, LEVL III, 20-29 MIN: ICD-10-PCS | Mod: 24,S$PBB,, | Performed by: REGISTERED NURSE

## 2021-10-29 PROCEDURE — 99999 PR PBB SHADOW E&M-EST. PATIENT-LVL III: ICD-10-PCS | Mod: PBBFAC,,,

## 2021-10-29 PROCEDURE — 99213 OFFICE O/P EST LOW 20 MIN: CPT | Mod: 24,S$PBB,, | Performed by: REGISTERED NURSE

## 2021-11-05 ENCOUNTER — TELEPHONE (OUTPATIENT)
Dept: OBSTETRICS AND GYNECOLOGY | Facility: CLINIC | Age: 31
End: 2021-11-05
Payer: MEDICAID

## 2021-11-05 ENCOUNTER — PATIENT MESSAGE (OUTPATIENT)
Dept: OBSTETRICS AND GYNECOLOGY | Facility: CLINIC | Age: 31
End: 2021-11-05

## 2021-12-08 ENCOUNTER — POSTPARTUM VISIT (OUTPATIENT)
Dept: OBSTETRICS AND GYNECOLOGY | Facility: CLINIC | Age: 31
End: 2021-12-08
Attending: OBSTETRICS & GYNECOLOGY
Payer: MEDICAID

## 2021-12-08 VITALS
DIASTOLIC BLOOD PRESSURE: 80 MMHG | BODY MASS INDEX: 37.85 KG/M2 | HEIGHT: 67 IN | SYSTOLIC BLOOD PRESSURE: 130 MMHG | WEIGHT: 241.19 LBS

## 2021-12-08 DIAGNOSIS — Z13.32 ENCOUNTER FOR SCREENING FOR MATERNAL DEPRESSION: ICD-10-CM

## 2021-12-08 DIAGNOSIS — Z30.9 ENCOUNTER FOR CONTRACEPTIVE MANAGEMENT, UNSPECIFIED TYPE: ICD-10-CM

## 2021-12-08 DIAGNOSIS — Z98.891 S/P CESAREAN SECTION: Primary | ICD-10-CM

## 2021-12-08 PROBLEM — O26.899 RH NEGATIVE STATE IN ANTEPARTUM PERIOD: Status: RESOLVED | Noted: 2021-06-09 | Resolved: 2021-12-08

## 2021-12-08 PROBLEM — O13.3 GESTATIONAL HYPERTENSION, THIRD TRIMESTER: Status: RESOLVED | Noted: 2021-10-21 | Resolved: 2021-12-08

## 2021-12-08 PROBLEM — A60.09 GENITAL HERPES SIMPLEX VIRUS (HSV) INFECTION IN MOTHER AFFECTING PREGNANCY: Status: RESOLVED | Noted: 2021-06-09 | Resolved: 2021-12-08

## 2021-12-08 PROBLEM — O34.219 HISTORY OF CESAREAN DELIVERY AFFECTING PREGNANCY: Status: RESOLVED | Noted: 2021-05-10 | Resolved: 2021-12-08

## 2021-12-08 PROBLEM — O98.319 GENITAL HERPES SIMPLEX VIRUS (HSV) INFECTION IN MOTHER AFFECTING PREGNANCY: Status: RESOLVED | Noted: 2021-06-09 | Resolved: 2021-12-08

## 2021-12-08 PROBLEM — O99.820 GBS (GROUP B STREPTOCOCCUS CARRIER), +RV CULTURE, CURRENTLY PREGNANT: Status: RESOLVED | Noted: 2021-09-30 | Resolved: 2021-12-08

## 2021-12-08 PROBLEM — Z67.91 RH NEGATIVE STATE IN ANTEPARTUM PERIOD: Status: RESOLVED | Noted: 2021-06-09 | Resolved: 2021-12-08

## 2021-12-08 PROBLEM — O09.299 HISTORY OF PRE-ECLAMPSIA IN PRIOR PREGNANCY, CURRENTLY PREGNANT: Status: RESOLVED | Noted: 2021-05-10 | Resolved: 2021-12-08

## 2021-12-08 LAB
B-HCG UR QL: NEGATIVE
CTP QC/QA: YES

## 2021-12-08 PROCEDURE — 96160 PT-FOCUSED HLTH RISK ASSMT: CPT | Mod: S$PBB,,, | Performed by: OBSTETRICS & GYNECOLOGY

## 2021-12-08 PROCEDURE — 99213 OFFICE O/P EST LOW 20 MIN: CPT | Mod: PBBFAC,PN | Performed by: OBSTETRICS & GYNECOLOGY

## 2021-12-08 PROCEDURE — 81025 URINE PREGNANCY TEST: CPT | Mod: PBBFAC,PN | Performed by: OBSTETRICS & GYNECOLOGY

## 2021-12-08 PROCEDURE — 0503F POSTPARTUM CARE VISIT: CPT | Mod: CPTII,,, | Performed by: OBSTETRICS & GYNECOLOGY

## 2021-12-08 PROCEDURE — 96160 PR PT FOCUSED HLTH RISK ASSMT: ICD-10-PCS | Mod: S$PBB,,, | Performed by: OBSTETRICS & GYNECOLOGY

## 2021-12-08 PROCEDURE — 96160 PT-FOCUSED HLTH RISK ASSMT: CPT | Mod: PBBFAC,PN | Performed by: OBSTETRICS & GYNECOLOGY

## 2021-12-08 PROCEDURE — 0503F PR POSTPARTUM CARE VISIT: ICD-10-PCS | Mod: CPTII,,, | Performed by: OBSTETRICS & GYNECOLOGY

## 2021-12-08 PROCEDURE — 99999 PR PBB SHADOW E&M-EST. PATIENT-LVL III: CPT | Mod: PBBFAC,,, | Performed by: OBSTETRICS & GYNECOLOGY

## 2021-12-08 PROCEDURE — 99999 PR PBB SHADOW E&M-EST. PATIENT-LVL III: ICD-10-PCS | Mod: PBBFAC,,, | Performed by: OBSTETRICS & GYNECOLOGY

## 2021-12-08 RX ORDER — ACETAMINOPHEN AND CODEINE PHOSPHATE 120; 12 MG/5ML; MG/5ML
1 SOLUTION ORAL DAILY
Qty: 28 TABLET | Refills: 12 | Status: SHIPPED | OUTPATIENT
Start: 2021-12-08 | End: 2022-10-18

## 2021-12-08 RX ORDER — ACETAMINOPHEN AND CODEINE PHOSPHATE 120; 12 MG/5ML; MG/5ML
1 SOLUTION ORAL DAILY
Qty: 28 TABLET | Refills: 12 | Status: SHIPPED | OUTPATIENT
Start: 2021-12-08 | End: 2021-12-08

## 2022-10-12 ENCOUNTER — TELEPHONE (OUTPATIENT)
Dept: OBSTETRICS AND GYNECOLOGY | Facility: CLINIC | Age: 32
End: 2022-10-12
Payer: MEDICAID

## 2022-10-13 ENCOUNTER — CLINICAL SUPPORT (OUTPATIENT)
Dept: OBSTETRICS AND GYNECOLOGY | Facility: CLINIC | Age: 32
End: 2022-10-13
Payer: MEDICAID

## 2022-10-13 DIAGNOSIS — Z71.9 COUNSELED BY NURSE: Primary | ICD-10-CM

## 2022-10-18 ENCOUNTER — OFFICE VISIT (OUTPATIENT)
Dept: OBSTETRICS AND GYNECOLOGY | Facility: CLINIC | Age: 32
End: 2022-10-18
Payer: MEDICAID

## 2022-10-18 VITALS
DIASTOLIC BLOOD PRESSURE: 90 MMHG | SYSTOLIC BLOOD PRESSURE: 132 MMHG | WEIGHT: 258.19 LBS | BODY MASS INDEX: 40.43 KG/M2

## 2022-10-18 DIAGNOSIS — Z32.01 PREGNANCY CONFIRMED BY POSITIVE URINE TEST: Primary | ICD-10-CM

## 2022-10-18 DIAGNOSIS — O09.299 HX OF PREECLAMPSIA, PRIOR PREGNANCY, CURRENTLY PREGNANT: ICD-10-CM

## 2022-10-18 DIAGNOSIS — O09.529 ANTEPARTUM MULTIGRAVIDA OF ADVANCED MATERNAL AGE: ICD-10-CM

## 2022-10-18 DIAGNOSIS — O09.899 SHORT INTERVAL BETWEEN PREGNANCIES AFFECTING PREGNANCY, ANTEPARTUM: ICD-10-CM

## 2022-10-18 DIAGNOSIS — O09.30 LATE PRENATAL CARE AFFECTING PREGNANCY, ANTEPARTUM: ICD-10-CM

## 2022-10-18 PROCEDURE — 87088 URINE BACTERIA CULTURE: CPT | Performed by: OBSTETRICS & GYNECOLOGY

## 2022-10-18 PROCEDURE — 81514 NFCT DS BV&VAGINITIS DNA ALG: CPT | Performed by: OBSTETRICS & GYNECOLOGY

## 2022-10-18 PROCEDURE — 1160F PR REVIEW ALL MEDS BY PRESCRIBER/CLIN PHARMACIST DOCUMENTED: ICD-10-PCS | Mod: CPTII,,, | Performed by: OBSTETRICS & GYNECOLOGY

## 2022-10-18 PROCEDURE — 1159F PR MEDICATION LIST DOCUMENTED IN MEDICAL RECORD: ICD-10-PCS | Mod: CPTII,,, | Performed by: OBSTETRICS & GYNECOLOGY

## 2022-10-18 PROCEDURE — 1159F MED LIST DOCD IN RCRD: CPT | Mod: CPTII,,, | Performed by: OBSTETRICS & GYNECOLOGY

## 2022-10-18 PROCEDURE — 87591 N.GONORRHOEAE DNA AMP PROB: CPT | Performed by: OBSTETRICS & GYNECOLOGY

## 2022-10-18 PROCEDURE — 99214 OFFICE O/P EST MOD 30 MIN: CPT | Mod: S$PBB,TH,, | Performed by: OBSTETRICS & GYNECOLOGY

## 2022-10-18 PROCEDURE — 87491 CHLMYD TRACH DNA AMP PROBE: CPT | Performed by: OBSTETRICS & GYNECOLOGY

## 2022-10-18 PROCEDURE — 1160F RVW MEDS BY RX/DR IN RCRD: CPT | Mod: CPTII,,, | Performed by: OBSTETRICS & GYNECOLOGY

## 2022-10-18 PROCEDURE — 99213 OFFICE O/P EST LOW 20 MIN: CPT | Mod: PBBFAC,TH | Performed by: OBSTETRICS & GYNECOLOGY

## 2022-10-18 PROCEDURE — 99999 PR PBB SHADOW E&M-EST. PATIENT-LVL III: ICD-10-PCS | Mod: PBBFAC,,, | Performed by: OBSTETRICS & GYNECOLOGY

## 2022-10-18 PROCEDURE — 99214 PR OFFICE/OUTPT VISIT, EST, LEVL IV, 30-39 MIN: ICD-10-PCS | Mod: S$PBB,TH,, | Performed by: OBSTETRICS & GYNECOLOGY

## 2022-10-18 PROCEDURE — 3080F DIAST BP >= 90 MM HG: CPT | Mod: CPTII,,, | Performed by: OBSTETRICS & GYNECOLOGY

## 2022-10-18 PROCEDURE — 87186 SC STD MICRODIL/AGAR DIL: CPT | Performed by: OBSTETRICS & GYNECOLOGY

## 2022-10-18 PROCEDURE — 3075F PR MOST RECENT SYSTOLIC BLOOD PRESS GE 130-139MM HG: ICD-10-PCS | Mod: CPTII,,, | Performed by: OBSTETRICS & GYNECOLOGY

## 2022-10-18 PROCEDURE — 87086 URINE CULTURE/COLONY COUNT: CPT | Performed by: OBSTETRICS & GYNECOLOGY

## 2022-10-18 PROCEDURE — 3075F SYST BP GE 130 - 139MM HG: CPT | Mod: CPTII,,, | Performed by: OBSTETRICS & GYNECOLOGY

## 2022-10-18 PROCEDURE — 87077 CULTURE AEROBIC IDENTIFY: CPT | Performed by: OBSTETRICS & GYNECOLOGY

## 2022-10-18 PROCEDURE — 99999 PR PBB SHADOW E&M-EST. PATIENT-LVL III: CPT | Mod: PBBFAC,,, | Performed by: OBSTETRICS & GYNECOLOGY

## 2022-10-18 PROCEDURE — 81025 URINE PREGNANCY TEST: CPT | Mod: PBBFAC | Performed by: OBSTETRICS & GYNECOLOGY

## 2022-10-18 PROCEDURE — 3080F PR MOST RECENT DIASTOLIC BLOOD PRESSURE >= 90 MM HG: ICD-10-PCS | Mod: CPTII,,, | Performed by: OBSTETRICS & GYNECOLOGY

## 2022-10-18 RX ORDER — ASPIRIN 81 MG/1
81 TABLET ORAL DAILY
Qty: 180 TABLET | Refills: 3 | Status: ON HOLD | OUTPATIENT
Start: 2022-10-18 | End: 2023-02-06 | Stop reason: HOSPADM

## 2022-10-18 NOTE — PROGRESS NOTES
Ole Allen is a 32 y.o. , presents today for amenorrhea.    Has not seen any other provider for this possible pregnancy.     C/C: amenorrhea    HPI: Reports amenorrhea since Patient's last menstrual period was 2022 (exact date).. Prior to LMP, menses were regular but she did have an irregular period. She had a period in April then 2 weeks later she had another period . She complains of some headaches but no nausea or vomiting. Denies vaginal bleeding since LMP.    SOCIAL HISTORY: Denies emotional/mental/physical/sexual violence or abuse. Feels safe at home. Accompanied today by her  and son. Feeling excited about pregnancy.This is her third baby for both of them.     PAP HISTORY: last pap 2021. She denies any history of abnormal pap smear or STDs.     She reports HTN but only with pregnancy.      Review of patient's allergies indicates:  No Known Allergies  Past Medical History:   Diagnosis Date    Asthma     Herpes      Past Surgical History:   Procedure Laterality Date    ANTERIOR CRUCIATE LIGAMENT REPAIR       SECTION       SECTION N/A 10/22/2021    Procedure:  SECTION;  Surgeon: Julie R. Jeansonne, MD;  [facility];  Service: OB/GYN;  Laterality: N/A;     SECTION N/A 10/22/2021    Procedure:  SECTION;  Surgeon: Sissy Jean MD;  [facility];  Service: OB/GYN;  Laterality: N/A;     Past Surgical History:   Procedure Laterality Date    ANTERIOR CRUCIATE LIGAMENT REPAIR       SECTION       SECTION N/A 10/22/2021    Procedure:  SECTION;  Surgeon: Julie R. Jeansonne, MD;  Location: [facility];  Service: OB/GYN;  Laterality: N/A;     SECTION N/A 10/22/2021    Procedure:  SECTION;  Surgeon: Sissy Jean MD;  Location: [facility];  Service: OB/GYN;  Laterality: N/A;     OB History    Para Term  AB Living   5       2 1   SAB IAB Ectopic Multiple Live Births     2     1       # Outcome Date GA Lbr Zack/2nd Weight Sex Delivery Anes PTL Lv   5 Current            4  06   3.487 kg (7 lb 11 oz) M CS-LTranv  N CLARIBEL      Birth Comments: failed IOL, preE   3             2 IAB            1 IAB               Obstetric Comments   Gynhx: reg   H/o genital herpes, chlamydia     OB History          5    Para        Term                AB   2    Living   1         SAB        IAB   2    Ectopic        Multiple        Live Births   1           Obstetric Comments   Gynhx: reg  H/o genital herpes, chlamydia               Social History     Socioeconomic History    Marital status: Single   Tobacco Use    Smoking status: Never    Smokeless tobacco: Never   Substance and Sexual Activity    Alcohol use: Not Currently     Comment: occ    Drug use: No    Sexual activity: Yes     Partners: Male     Family History   Adopted: Yes   Problem Relation Age of Onset    Miscarriages / Stillbirths Neg Hx     Cancer Neg Hx      Social History     Substance and Sexual Activity   Sexual Activity Yes    Partners: Male       GENETIC SCREENING   Patient's age 35 years or older as of estimated date of delivery? YES  Neural tube defect (meningomyelocele, spina bifida, or anencephaly)? no  Down syndrome? no  Preston-Sachs (Ashkenazi Buddhism, Cajun, Norwegian Bacon)? no  Canavan disease (Ashkenazi Buddhism)? no  Familial dysautonomia (Ashkenazi Buddhism)? no  Sickle cell disease or trait ()? no  Hemophilia or other blood disorders? no  Cystic fibrosis? no  Muscular dystrophy? no  Preston's chorea? no  Thalassemia (Italian, Greek, Mediterranean, or  background) MCV less than 80? no  Congenital heart defect? no  Mental retardation/autism? no   If Yes, was person tested for Fragile X? no  Other inherited genetic or chromosomal disorder? no  Maternal metabolic disorder (e.g. type 1 diabetes, PKU)? no  Patient or baby's father had a child with birth defects not listed above? no  Recurrent  pregnancy loss or a stillbirth: no  Medications (including supplements, vitamins, herbs or OTC drugs)/illicit/recreational drugs/alcohol since last menstrual period? no    Umesh He MD     ROS:  Constitutional/Gen: Denies fevers, chills, malaise, or weight loss. denies fatigue   Psych: Denies depression, anxiety  CV/vasc: Denies heart palpitations or edema  Resp: Denies SOB or dyspnea  Breasts: Denies mass, nipple discharge, or trauma. reports breast tenderness.  GI: Denies constipation, diarrhea, or vomiting. denies nausea.  : Denies vaginal discharge, dysuria or pelvic pain. reports urinary frequency      OBJECTIVE:  BP (!) 132/90   Wt 117.1 kg (258 lb 2.5 oz)   LMP 2022 (Exact Date)   BMI 40.43 kg/m²   Constitutional/Gen: NAD, appears stated age  Lung: normal resp effort, CTAB  Heart: normal HR, RRR   Back: negative CVAT  Abdomen: soft, nontender, no masses, and bowel sounds normal, no enlargement  External genitalia: no lesions or discharge, normal hair distribution  Vagina: normal appearance, no lesions, no discharge, no evidence cystocele or rectocele.  Cervix: normal appearance, no discharge, no lesions, negative CMT  Uterus: nontender, mobile, approx 24-26 week size, contour, and position  Extremities: FROM, with no edema or tenderness.  Neurologic: A&O x 3  Psych: affect appropriate and without signs of mood, thought or memory difficulty appreciated      UPT positive in office    ASSESSMENT:  32 y.o. female  with amenorrhea  Likely at 23w3d via LMP  Body mass index is 40.43 kg/m².  Patient Active Problem List   Diagnosis    Asthma    HSV (herpes simplex virus) anogenital infection    Sickle cell trait; FOB needs screening    Marijuana abuse in remission    S/P  section       PLAN:  1. Amenorrhea  -- + UPT in office, Patient's last menstrual period was 2022 (exact date). --> AMANDA: 2022  -- Dating US ordered  -- Anatomical US ordered  -- Routine serum and  urine prenatal labs today    2. Physical exam today  -- Discussed ASCCP pap guidelines. Last pap normal 05/2021. /    3. BMI 40.43  -- Discussed IOM recommended weight gain of:   Underweight Less than 18.5 28-40    Normal Weight 18.5-24.9 25-35    Overweight 25-29.9  15-25    Obese   30 and greater 11-20   -- Discussed criteria for delivery at [facility] r/t excessive pre-preg weight or excessive weight gain:   Pre-pregnancy BMI over 40 or excess pregnancy weight gain defined as:   Pre-preg BMI < 18.5; Excess weight gain = > 60 pound   Pre-preg BMI 18.5-24.9;  Excess weight gain = > 53 pounds   Pre-preg BMI 25-29.9;  Excess weight gain = > 38 pounds   Pre-preg BMI > 30;  Excess weight gain = > 30 pounds    4. Discussed nausea and vomiting in pregnancy  -- Education regarding lifestyle and dietary modifications  -- Reviewed use of B6/Unisom prn. Pt will notify us if no relief/worsening symptoms, will consider alternative therapies prn    5. Pregnancy education and couseling; handouts and booklet provided  -- Oriented to practice and anticipated prenatal course of care and how to contact us  -- Precautions/warning signs reviewed  -- Common complaints of pregnancy  -- Routine prenatal labs including HIV  -- Ultrasounds  -- Nutrition, prepregnant BMI, and recommended weight gain  -- Toxoplasmosis precautions (Cats/Raw Meat)  -- Sexual activity and exercise  -- Environmental/Work hazards  -- Travel  -- Tobacco (Ask, Advise, Assess, Assist, and Arrange), as well as alcohol and drug use  -- Use of any medications (Including supplements, Vitamins, Herbs, or OTC Drugs)  -- Domestic violence screen negative    6. Reviewed genetic testing options. Reviewed available first trimester and/or second  trimester screening options. Reviewed risk of false positive/negative results and recommendation of referral to [facility] in event of a positive result, for NIPT, US, and/or amniocentesis. Reviewed the possible estimated 1 in 300/500 risk of  miscarriage with amniocentesis, even with a healthy fetus. Patient desires genetic screening. Mat21 form given    7. AMA  -- Referral to [facility]    8. Short Interval Pregnancy   - RLTCS 10/22/2021    RTC x 4 weeks, call or present sooner prn.     Updated Medication List:  Current Outpatient Medications   Medication Sig Dispense Refill    ferrous sulfate (FEOSOL) 325 mg (65 mg iron) Tab tablet Take 1 tablet (325 mg total) by mouth daily with breakfast. 30 tablet 11    HYDROcodone-acetaminophen (NORCO) 5-325 mg per tablet Take 1 tablet by mouth every 6 (six) hours as needed for Pain. 20 tablet 0    NIFEdipine (PROCARDIA-XL) 30 MG (OSM) 24 hr tablet Take 1 tablet (30 mg total) by mouth once daily. 30 tablet 11    ondansetron (ZOFRAN) 4 MG tablet Take 1 tablet (4 mg total) by mouth every 6 (six) hours as needed for Nausea. 10 tablet 0    valACYclovir (VALTREX) 500 MG tablet Take 1 tablet (500 mg total) by mouth 2 (two) times daily. 60 tablet 1    albuterol sulfate 2.5 mg/0.5 mL Nebu Take 2.5 mg by nebulization every 4 (four) hours as needed. 10 each 0    aspirin (ECOTRIN) 81 MG EC tablet Take 1 tablet (81 mg total) by mouth once daily. 150 tablet 2    prenatal 21-iron fu-folic acid (PRENATAL COMPLETE) 14 mg iron- 400 mcg Tab Take 1 tablet by mouth once daily. 30 tablet 0    prenatal vit,shannan 74-iron-folic (PRENAPLUS) 27 mg iron- 1 mg Tab Take 1 tablet by mouth once daily. 30 tablet 11     No current facility-administered medications for this visit.     Ole Saavedra was seen today for confirmation.    Diagnoses and all orders for this visit:    Pregnancy confirmed by positive urine test  -     POCT urine pregnancy  -     Urine culture  -     POCT URINE DIPSTICK WITHOUT MICROSCOPE  -     US OB/GYN Procedure ([facility]) - Extended List - Future; Future  -     Vaginosis Screen by DNA Probe  -     C. trachomatis/N. gonorrhoeae by AMP DNA  -     PNV,calcium 72-iron-folic acid (PRENATAL VITAMIN PLUS LOW IRON) 27 mg iron- 1 mg  Tab; Take 1 tablet (1 each total) by mouth once daily.    Short interval between pregnancies affecting pregnancy, antepartum    Late prenatal care affecting pregnancy, antepartum  -     HIV 1/2 Ag/Ab (4th Gen); Future  -     RPR; Future  -     Type & Screen - Ob Profile; Future  -     Rubella Antibody, IgG; Future  -     Hemoglobin A1C; Future  -     CBC Auto Differential; Future  -     Comprehensive Metabolic Panel; Future  -     Hepatitis Panel, Acute; Future  -     aspirin (ECOTRIN) 81 MG EC tablet; Take 1 tablet (81 mg total) by mouth once daily.  -     PNV,calcium 72-iron-folic acid (PRENATAL VITAMIN PLUS LOW IRON) 27 mg iron- 1 mg Tab; Take 1 tablet (1 each total) by mouth once daily.    Hx of preeclampsia, prior pregnancy, currently pregnant  -     aspirin (ECOTRIN) 81 MG EC tablet; Take 1 tablet (81 mg total) by mouth once daily.  -     Ambulatory referral/consult to Perinatology; Future    Antepartum multigravida of advanced maternal age  -     Ambulatory referral/consult to Perinatology; Future        Orders Placed This Encounter   Procedures    Urine culture    Vaginosis Screen by DNA Probe    C. trachomatis/N. gonorrhoeae by AMP DNA    HIV 1/2 Ag/Ab (4th Gen)    RPR    Rubella Antibody, IgG    Hemoglobin A1C    CBC Auto Differential    Comprehensive Metabolic Panel    Hepatitis Panel, Acute    Ambulatory referral/consult to Perinatology    POCT urine pregnancy    POCT URINE DIPSTICK WITHOUT MICROSCOPE    Type & Screen - Ob Profile    US OB/GYN Procedure ([facility]) - Extended List - Future       Follow up in about 4 weeks (around 11/15/2022) for Routine OB.      Avila Petersen MD  10/18/2022 10:11 AM

## 2022-10-19 DIAGNOSIS — N30.00 ACUTE CYSTITIS WITHOUT HEMATURIA: Primary | ICD-10-CM

## 2022-10-19 LAB
BACTERIAL VAGINOSIS DNA: NEGATIVE
C TRACH DNA SPEC QL NAA+PROBE: NOT DETECTED
CANDIDA GLABRATA DNA: NEGATIVE
CANDIDA KRUSEI DNA: NEGATIVE
CANDIDA RRNA VAG QL PROBE: POSITIVE
N GONORRHOEA DNA SPEC QL NAA+PROBE: NOT DETECTED
T VAGINALIS RRNA GENITAL QL PROBE: NEGATIVE

## 2022-10-19 RX ORDER — CEPHALEXIN 500 MG/1
500 CAPSULE ORAL EVERY 12 HOURS
Qty: 14 CAPSULE | Refills: 0 | Status: SHIPPED | OUTPATIENT
Start: 2022-10-19 | End: 2022-10-26

## 2022-10-20 ENCOUNTER — TELEPHONE (OUTPATIENT)
Dept: OBSTETRICS AND GYNECOLOGY | Facility: CLINIC | Age: 32
End: 2022-10-20
Payer: MEDICAID

## 2022-10-20 DIAGNOSIS — B37.9 YEAST INFECTION: Primary | ICD-10-CM

## 2022-10-20 DIAGNOSIS — N30.00 ACUTE CYSTITIS WITHOUT HEMATURIA: ICD-10-CM

## 2022-10-20 LAB — BACTERIA UR CULT: ABNORMAL

## 2022-10-20 RX ORDER — FLUCONAZOLE 150 MG/1
150 TABLET ORAL ONCE
Qty: 1 TABLET | Refills: 1 | Status: SHIPPED | OUTPATIENT
Start: 2022-10-20 | End: 2022-10-20

## 2022-10-20 NOTE — TELEPHONE ENCOUNTER
Pt call was accepted. Pt was wanting to know what she had medication at the pharmacy for. Pt is now aware that her urine cx came back + for UTI and vaginosis came back + for yeast. Pt verbalized understanding with no further questions.

## 2022-10-21 ENCOUNTER — PATIENT MESSAGE (OUTPATIENT)
Dept: MATERNAL FETAL MEDICINE | Facility: CLINIC | Age: 32
End: 2022-10-21
Payer: MEDICAID

## 2022-10-24 ENCOUNTER — OFFICE VISIT (OUTPATIENT)
Dept: MATERNAL FETAL MEDICINE | Facility: CLINIC | Age: 32
End: 2022-10-24
Payer: MEDICAID

## 2022-10-24 ENCOUNTER — PROCEDURE VISIT (OUTPATIENT)
Dept: MATERNAL FETAL MEDICINE | Facility: CLINIC | Age: 32
End: 2022-10-24
Payer: MEDICAID

## 2022-10-24 ENCOUNTER — TELEPHONE (OUTPATIENT)
Dept: MATERNAL FETAL MEDICINE | Facility: CLINIC | Age: 32
End: 2022-10-24
Payer: MEDICAID

## 2022-10-24 VITALS
WEIGHT: 255.06 LBS | HEIGHT: 67 IN | BODY MASS INDEX: 40.03 KG/M2 | DIASTOLIC BLOOD PRESSURE: 80 MMHG | SYSTOLIC BLOOD PRESSURE: 110 MMHG

## 2022-10-24 DIAGNOSIS — Z36.2 ENCOUNTER FOR FOLLOW-UP ULTRASOUND OF FETAL ANATOMY: ICD-10-CM

## 2022-10-24 DIAGNOSIS — O09.299 HX OF PREECLAMPSIA, PRIOR PREGNANCY, CURRENTLY PREGNANT: ICD-10-CM

## 2022-10-24 DIAGNOSIS — O09.522 ADVANCED MATERNAL AGE IN MULTIGRAVIDA, SECOND TRIMESTER: ICD-10-CM

## 2022-10-24 DIAGNOSIS — O99.519 ASTHMA DURING PREGNANCY: Primary | ICD-10-CM

## 2022-10-24 DIAGNOSIS — J45.20 MILD INTERMITTENT ASTHMA WITHOUT COMPLICATION: Chronic | ICD-10-CM

## 2022-10-24 DIAGNOSIS — J45.909 ASTHMA DURING PREGNANCY: Primary | ICD-10-CM

## 2022-10-24 DIAGNOSIS — Z98.891 S/P CESAREAN SECTION: ICD-10-CM

## 2022-10-24 DIAGNOSIS — O09.529 ANTEPARTUM MULTIGRAVIDA OF ADVANCED MATERNAL AGE: ICD-10-CM

## 2022-10-24 DIAGNOSIS — Z36.89 ENCOUNTER FOR ULTRASOUND TO ASSESS FETAL GROWTH: Primary | ICD-10-CM

## 2022-10-24 DIAGNOSIS — Z32.01 PREGNANCY CONFIRMED BY POSITIVE URINE TEST: ICD-10-CM

## 2022-10-24 PROCEDURE — 1159F PR MEDICATION LIST DOCUMENTED IN MEDICAL RECORD: ICD-10-PCS | Mod: CPTII,,, | Performed by: OBSTETRICS & GYNECOLOGY

## 2022-10-24 PROCEDURE — 3074F PR MOST RECENT SYSTOLIC BLOOD PRESSURE < 130 MM HG: ICD-10-PCS | Mod: CPTII,,, | Performed by: OBSTETRICS & GYNECOLOGY

## 2022-10-24 PROCEDURE — 3074F SYST BP LT 130 MM HG: CPT | Mod: CPTII,,, | Performed by: OBSTETRICS & GYNECOLOGY

## 2022-10-24 PROCEDURE — 76811 US OB/GYN EXTENDED PROCEDURE (VIEWPOINT): ICD-10-PCS | Mod: 26,S$PBB,, | Performed by: OBSTETRICS & GYNECOLOGY

## 2022-10-24 PROCEDURE — 99213 OFFICE O/P EST LOW 20 MIN: CPT | Mod: PBBFAC,TH,PO,25 | Performed by: OBSTETRICS & GYNECOLOGY

## 2022-10-24 PROCEDURE — 1160F PR REVIEW ALL MEDS BY PRESCRIBER/CLIN PHARMACIST DOCUMENTED: ICD-10-PCS | Mod: CPTII,,, | Performed by: OBSTETRICS & GYNECOLOGY

## 2022-10-24 PROCEDURE — 99999 PR PBB SHADOW E&M-EST. PATIENT-LVL III: ICD-10-PCS | Mod: PBBFAC,,, | Performed by: OBSTETRICS & GYNECOLOGY

## 2022-10-24 PROCEDURE — 1159F MED LIST DOCD IN RCRD: CPT | Mod: CPTII,,, | Performed by: OBSTETRICS & GYNECOLOGY

## 2022-10-24 PROCEDURE — 99215 PR OFFICE/OUTPT VISIT, EST, LEVL V, 40-54 MIN: ICD-10-PCS | Mod: 25,S$PBB,TH, | Performed by: OBSTETRICS & GYNECOLOGY

## 2022-10-24 PROCEDURE — 76811 OB US DETAILED SNGL FETUS: CPT | Mod: PBBFAC,PO | Performed by: OBSTETRICS & GYNECOLOGY

## 2022-10-24 PROCEDURE — 99999 PR PBB SHADOW E&M-EST. PATIENT-LVL III: CPT | Mod: PBBFAC,,, | Performed by: OBSTETRICS & GYNECOLOGY

## 2022-10-24 PROCEDURE — 99215 OFFICE O/P EST HI 40 MIN: CPT | Mod: 25,S$PBB,TH, | Performed by: OBSTETRICS & GYNECOLOGY

## 2022-10-24 PROCEDURE — 3079F DIAST BP 80-89 MM HG: CPT | Mod: CPTII,,, | Performed by: OBSTETRICS & GYNECOLOGY

## 2022-10-24 PROCEDURE — 1160F RVW MEDS BY RX/DR IN RCRD: CPT | Mod: CPTII,,, | Performed by: OBSTETRICS & GYNECOLOGY

## 2022-10-24 PROCEDURE — 3079F PR MOST RECENT DIASTOLIC BLOOD PRESSURE 80-89 MM HG: ICD-10-PCS | Mod: CPTII,,, | Performed by: OBSTETRICS & GYNECOLOGY

## 2022-10-24 RX ORDER — ALBUTEROL SULFATE 2.5 MG/.5ML
2.5 SOLUTION RESPIRATORY (INHALATION) EVERY 4 HOURS PRN
Qty: 10 EACH | Refills: 1 | Status: SHIPPED | OUTPATIENT
Start: 2022-10-24 | End: 2022-11-17

## 2022-10-24 NOTE — PROGRESS NOTES
MATERNAL-FETAL MEDICINE   CONSULT NOTE    Provider requesting consultation: Dr. Herman    SUBJECTIVE:     Ms. Ole Allen is a 32 y.o.  female with IUP at 24w6d who is seen in consultation by MFM for evaluation and management of:  Problem   Advanced Maternal Age in Multigravida, Second Trimester   Mild Intermittent Asthma Without Complication            Medication List with Changes/Refills   Current Medications    ALBUTEROL SULFATE 2.5 MG/0.5 ML NEBU    Take 2.5 mg by nebulization every 4 (four) hours as needed.    ASPIRIN (ECOTRIN) 81 MG EC TABLET    Take 1 tablet (81 mg total) by mouth once daily.    CEPHALEXIN (KEFLEX) 500 MG CAPSULE    Take 1 capsule (500 mg total) by mouth every 12 (twelve) hours. for 7 days    PNV,CALCIUM 72-IRON-FOLIC ACID (PRENATAL VITAMIN PLUS LOW IRON) 27 MG IRON- 1 MG TAB    Take 1 tablet (1 each total) by mouth once daily.   Discontinued Medications    FERROUS SULFATE (FEOSOL) 325 MG (65 MG IRON) TAB TABLET    Take 1 tablet (325 mg total) by mouth daily with breakfast.    HYDROCODONE-ACETAMINOPHEN (NORCO) 5-325 MG PER TABLET    Take 1 tablet by mouth every 6 (six) hours as needed for Pain.    NIFEDIPINE (PROCARDIA-XL) 30 MG (OSM) 24 HR TABLET    Take 1 tablet (30 mg total) by mouth once daily.    ONDANSETRON (ZOFRAN) 4 MG TABLET    Take 1 tablet (4 mg total) by mouth every 6 (six) hours as needed for Nausea.    VALACYCLOVIR (VALTREX) 500 MG TABLET    Take 1 tablet (500 mg total) by mouth 2 (two) times daily.       Review of patient's allergies indicates:  No Known Allergies    PMH:  Past Medical History:   Diagnosis Date    Asthma     Hemoglobin S trait     Herpes        PObHx:  OB History    Para Term  AB Living   5 2 2   2 2   SAB IAB Ectopic Multiple Live Births     2     2      # Outcome Date GA Lbr Zack/2nd Weight Sex Delivery Anes PTL Lv   5 Current            4 Term 10/22/21 41w1d  3.459 kg (7 lb 10 oz) M CS-LTranv Spinal N CLARIBEL       "Complications: Gestational hypertension without significant proteinuria during pregnancy in third trimester, antepartum   3 Term 06 39w0d  3.487 kg (7 lb 11 oz) M CS-LTranv  N CLARIBEL      Birth Comments: failed IOL, preE      Complications: Preeclampsia, third trimester   2 IAB            1 IAB               Obstetric Comments   Gynhx: reg   H/o genital herpes, chlamydia       PSH:  Past Surgical History:   Procedure Laterality Date    ANTERIOR CRUCIATE LIGAMENT REPAIR       SECTION       SECTION N/A 10/22/2021    Procedure:  SECTION;  Surgeon: Julie R. Jeansonne, MD;  Location: [facility];  Service: OB/GYN;  Laterality: N/A;     SECTION N/A 10/22/2021    Procedure:  SECTION;  Surgeon: Sissy Jean MD;  Location: [facility] L&[facility];  Service: OB/GYN;  Laterality: N/A;    DILATION AND CURETTAGE OF UTERUS N/A     DILATION AND CURETTAGE OF UTERUS USING SUCTION N/A        Family history:family history is not on file. She was adopted.    Social history: reports that she has never smoked. She has never used smokeless tobacco. She reports that she does not currently use alcohol. She reports that she does not use drugs.    Genetic history: The patient is adopted, unaware of her family's genetic history. She denies any inherited genetic diseases or birth defects in her partner's personal history or family.    Objective:   /80 (BP Location: Left arm)   Ht 5' 7" (1.702 m)   Wt 115.7 kg (255 lb 1.2 oz)   LMP 2022 (Exact Date)   BMI 39.95 kg/m²     Ultrasound performed. See viewpoint for full ultrasound report.  1. A detailed fetal anatomic ultrasound examination was performed for the following high risk indication: BMI/AMA   2. No fetal structural malformations are identified; however, fetal imaging is incomplete today with limited 4CH/septum, LVOT, 3V/3VTV, S/IVC and sacral spine. A follow-up study will be scheduled to complete the fetal anatomic survey.   3. Fetal " size today is consistent with established gestational age.  4. Cervical length is normal.  5. Placental location is posterior without evidence of previa.  6. Amniotic fluid volume appears normal.     ASSESSMENT/PLAN:     32 y.o.  female with IUP at 24w6d     Advanced maternal age in multigravida, second trimester  Age-related Down Syndrome risk (DSR): 1:296  Chromosome screening: none to date  Today I counseled the patient on the relationship between maternal age and adverse pregnancy outcomes. The most prominent is fetal aneuploidy. Pt is planning cell free DNA, did not have insurance card when she went for blood draw last week.   Additional risks associated with maternal age greater than 35 include preeclampsia, gestational diabetes, and fetal growth restriction.  Maternal age greater than 40 is associated with increased risk of stillbirth. There is no established preventative measures against gestational diabetes once gestation is already started.  Early onset preeclampsia can be prevented in high risk patients with daily baby aspirin.  Fetal growth restriction is screened for with 3rd trimester ultrasound.     Recommendations:  1. Detailed anatomic survey at 19-20 weeks, started today, to be completed in 4 weeks  2. Continue ASA 81 mg daily  3. Follow with fetal ultrasound at 32-34 weeks for interval fetal growth  4. Delivery timing is 39-40 weeks in the absence of indication for earlier delivery    Mild intermittent asthma without complication  Seasonal, though typically uses inhaler 1 x monthly, environmental allergies her trigger  Current regimen:   - Albuterol inhaler prn     Asthma is the most common condition affecting the lungs during pregnancy, occurring in between 4-8 % of pregnant women. During pregnancy, asthma worsens in about one-third of women, improves in one-third, and remains stable in one-third. The risk of poorly controlled asthma is much greater than the risk of taking medications to  control symptoms. Asthma exacerbations are frequently seen in the second trimester, and close monitoring is indicated. Patients should avoid exposure to specific allergens and triggers. Moderate persistent and severe persistent asthma are associated with  delivery,  delivery, preeclampsia, fetal growth restriction, and maternal morbidity.     Recommendations:  1. Continue current medications (pt currently out, Dr. Petersen to prescribe)  2. If progresses from mild, will need Asthma Action Plan and pulmonary function tests  3. Administer flu vaccination, when available; consider pneumovax if other comorbidities  4. Recommend COVID vaccination + updated boosters  5. Avoid prostaglandins such as E2 and F2-alpha (Carboprost/Hemabate)      S/P  section  Hx of induction of labor x 2 for pregnancy-associated HTN, preE in first delivery and GHTN in her second  Failed induction of labor in P1 resulting in primary   Fetal intolerance of labor in P2 with subsequent repeat CD  Strongly desires vaginal delivery  [facility]  Calculator predicts 49.7% likelihood of vaginal delivery     Today we discussed that spontaneous labor is the variable factor that best favors a successful , though with two prior pregnancies requiring delivery for pregnancy associated hypertension the likelihood of recurrence is high. She has started ASA 81 mg as PAH prophylaxis.    Recommendations:  1. Continued conversation with Dr. Herman      FOLLOW UP:   Fu in 4 weeks for US  No further MFM visits were scheduled      42 minutes of total time spent on the encounter, which includes face to face time and non-face to face time preparing to see the patient (eg, review of tests), obtaining and/or reviewing separately obtained history, documenting clinical information in the electronic or other health record, independently interpreting results (not separately reported) and communicating results to the  patient/family/caregiver, or care coordination (not separately reported).      Seun Castro III  Maternal-Fetal Medicine    Electronically Signed by Seun Castro III October 24, 2022

## 2022-10-24 NOTE — ASSESSMENT & PLAN NOTE
Seasonal, though typically uses inhaler 1 x monthly, environmental allergies her trigger  Current regimen:   - Albuterol inhaler prn     Asthma is the most common condition affecting the lungs during pregnancy, occurring in between 4-8 % of pregnant women. During pregnancy, asthma worsens in about one-third of women, improves in one-third, and remains stable in one-third. The risk of poorly controlled asthma is much greater than the risk of taking medications to control symptoms. Asthma exacerbations are frequently seen in the second trimester, and close monitoring is indicated. Patients should avoid exposure to specific allergens and triggers. Moderate persistent and severe persistent asthma are associated with  delivery,  delivery, preeclampsia, fetal growth restriction, and maternal morbidity.     Recommendations:  1. Continue current medications (pt currently out, Dr. Petersen to prescribe)  2. If progresses from mild, will need Asthma Action Plan and pulmonary function tests  3. Administer flu vaccination, when available; consider pneumovax if other comorbidities  4. Recommend COVID vaccination + updated boosters  5. Avoid prostaglandins such as E2 and F2-alpha (Carboprost/Hemabate)

## 2022-10-24 NOTE — ASSESSMENT & PLAN NOTE
Age-related Down Syndrome risk (DSR): 1:296  Chromosome screening: none to date  Today I counseled the patient on the relationship between maternal age and adverse pregnancy outcomes. The most prominent is fetal aneuploidy. Pt is planning cell free DNA, did not have insurance card when she went for blood draw last week.   Additional risks associated with maternal age greater than 35 include preeclampsia, gestational diabetes, and fetal growth restriction.  Maternal age greater than 40 is associated with increased risk of stillbirth. There is no established preventative measures against gestational diabetes once gestation is already started.  Early onset preeclampsia can be prevented in high risk patients with daily baby aspirin.  Fetal growth restriction is screened for with 3rd trimester ultrasound.     Recommendations:  1. Detailed anatomic survey at 19-20 weeks, started today, to be completed in 4 weeks  2. Continue ASA 81 mg daily  3. Follow with fetal ultrasound at 32-34 weeks for interval fetal growth  4. Delivery timing is 39-40 weeks in the absence of indication for earlier delivery

## 2022-10-24 NOTE — ASSESSMENT & PLAN NOTE
Hx of induction of labor x 2 for pregnancy-associated HTN, preE in first delivery and GHTN in her second  Failed induction of labor in P1 resulting in primary   Fetal intolerance of labor in P2 with subsequent repeat CD  Strongly desires vaginal delivery  [facility]  Calculator predicts 49.7% likelihood of vaginal delivery     Today we discussed that spontaneous labor is the variable factor that best favors a successful , though with two prior pregnancies requiring delivery for pregnancy associated hypertension the likelihood of recurrence is high. She has started ASA 81 mg as PAH prophylaxis.    Recommendations:  1. Continued conversation with Dr. Herman

## 2022-11-08 ENCOUNTER — LAB VISIT (OUTPATIENT)
Dept: LAB | Facility: HOSPITAL | Age: 32
End: 2022-11-08
Attending: OBSTETRICS & GYNECOLOGY
Payer: MEDICAID

## 2022-11-08 DIAGNOSIS — O09.30 LATE PRENATAL CARE AFFECTING PREGNANCY, ANTEPARTUM: ICD-10-CM

## 2022-11-08 LAB
ABO + RH BLD: NORMAL
ALBUMIN SERPL BCP-MCNC: 2.9 G/DL (ref 3.5–5.2)
ALP SERPL-CCNC: 84 U/L (ref 55–135)
ALT SERPL W/O P-5'-P-CCNC: 9 U/L (ref 10–44)
ANION GAP SERPL CALC-SCNC: 6 MMOL/L (ref 8–16)
AST SERPL-CCNC: 11 U/L (ref 10–40)
BASOPHILS # BLD AUTO: 0.04 K/UL (ref 0–0.2)
BASOPHILS NFR BLD: 0.5 % (ref 0–1.9)
BILIRUB SERPL-MCNC: 0.4 MG/DL (ref 0.1–1)
BLD GP AB SCN CELLS X3 SERPL QL: NORMAL
BUN SERPL-MCNC: 5 MG/DL (ref 6–20)
CALCIUM SERPL-MCNC: 9 MG/DL (ref 8.7–10.5)
CHLORIDE SERPL-SCNC: 106 MMOL/L (ref 95–110)
CO2 SERPL-SCNC: 25 MMOL/L (ref 23–29)
CREAT SERPL-MCNC: 0.6 MG/DL (ref 0.5–1.4)
DIFFERENTIAL METHOD: ABNORMAL
EOSINOPHIL # BLD AUTO: 0.6 K/UL (ref 0–0.5)
EOSINOPHIL NFR BLD: 6.9 % (ref 0–8)
ERYTHROCYTE [DISTWIDTH] IN BLOOD BY AUTOMATED COUNT: 14.4 % (ref 11.5–14.5)
EST. GFR  (NO RACE VARIABLE): >60 ML/MIN/1.73 M^2
GLUCOSE SERPL-MCNC: 122 MG/DL (ref 70–110)
HCT VFR BLD AUTO: 28 % (ref 37–48.5)
HGB BLD-MCNC: 9.7 G/DL (ref 12–16)
IMM GRANULOCYTES # BLD AUTO: 0.06 K/UL (ref 0–0.04)
IMM GRANULOCYTES NFR BLD AUTO: 0.7 % (ref 0–0.5)
LYMPHOCYTES # BLD AUTO: 1.7 K/UL (ref 1–4.8)
LYMPHOCYTES NFR BLD: 20.1 % (ref 18–48)
MCH RBC QN AUTO: 30.8 PG (ref 27–31)
MCHC RBC AUTO-ENTMCNC: 34.6 G/DL (ref 32–36)
MCV RBC AUTO: 89 FL (ref 82–98)
MONOCYTES # BLD AUTO: 0.5 K/UL (ref 0.3–1)
MONOCYTES NFR BLD: 5.4 % (ref 4–15)
NEUTROPHILS # BLD AUTO: 5.6 K/UL (ref 1.8–7.7)
NEUTROPHILS NFR BLD: 66.4 % (ref 38–73)
NRBC BLD-RTO: 0 /100 WBC
PLATELET # BLD AUTO: 232 K/UL (ref 150–450)
PMV BLD AUTO: 10.8 FL (ref 9.2–12.9)
POTASSIUM SERPL-SCNC: 3.6 MMOL/L (ref 3.5–5.1)
PROT SERPL-MCNC: 6.7 G/DL (ref 6–8.4)
RBC # BLD AUTO: 3.15 M/UL (ref 4–5.4)
SODIUM SERPL-SCNC: 137 MMOL/L (ref 136–145)
WBC # BLD AUTO: 8.37 K/UL (ref 3.9–12.7)

## 2022-11-08 PROCEDURE — 80053 COMPREHEN METABOLIC PANEL: CPT | Performed by: OBSTETRICS & GYNECOLOGY

## 2022-11-08 PROCEDURE — 85025 COMPLETE CBC W/AUTO DIFF WBC: CPT | Performed by: OBSTETRICS & GYNECOLOGY

## 2022-11-08 PROCEDURE — 87389 HIV-1 AG W/HIV-1&-2 AB AG IA: CPT | Performed by: OBSTETRICS & GYNECOLOGY

## 2022-11-08 PROCEDURE — 36415 COLL VENOUS BLD VENIPUNCTURE: CPT | Performed by: OBSTETRICS & GYNECOLOGY

## 2022-11-08 PROCEDURE — 86592 SYPHILIS TEST NON-TREP QUAL: CPT | Performed by: OBSTETRICS & GYNECOLOGY

## 2022-11-08 PROCEDURE — 86762 RUBELLA ANTIBODY: CPT | Performed by: OBSTETRICS & GYNECOLOGY

## 2022-11-08 PROCEDURE — 83036 HEMOGLOBIN GLYCOSYLATED A1C: CPT | Performed by: OBSTETRICS & GYNECOLOGY

## 2022-11-08 PROCEDURE — 80074 ACUTE HEPATITIS PANEL: CPT | Performed by: OBSTETRICS & GYNECOLOGY

## 2022-11-08 PROCEDURE — 86901 BLOOD TYPING SEROLOGIC RH(D): CPT | Performed by: OBSTETRICS & GYNECOLOGY

## 2022-11-09 LAB
ESTIMATED AVG GLUCOSE: 71 MG/DL (ref 68–131)
HAV IGM SERPL QL IA: NORMAL
HBA1C MFR BLD: 4.1 % (ref 4–5.6)
HBV CORE IGM SERPL QL IA: NORMAL
HBV SURFACE AG SERPL QL IA: NORMAL
HCV AB SERPL QL IA: NORMAL
HIV 1+2 AB+HIV1 P24 AG SERPL QL IA: NORMAL

## 2022-11-10 LAB
RPR SER QL: NORMAL
RUBV IGG SER-ACNC: 22.4 IU/ML
RUBV IGG SER-IMP: REACTIVE

## 2022-11-15 ENCOUNTER — PATIENT MESSAGE (OUTPATIENT)
Dept: OBSTETRICS AND GYNECOLOGY | Facility: CLINIC | Age: 32
End: 2022-11-15

## 2022-11-15 ENCOUNTER — INITIAL PRENATAL (OUTPATIENT)
Dept: OBSTETRICS AND GYNECOLOGY | Facility: CLINIC | Age: 32
End: 2022-11-15
Payer: MEDICAID

## 2022-11-15 VITALS — BODY MASS INDEX: 40.4 KG/M2 | WEIGHT: 257.94 LBS | SYSTOLIC BLOOD PRESSURE: 130 MMHG | DIASTOLIC BLOOD PRESSURE: 80 MMHG

## 2022-11-15 DIAGNOSIS — Z98.891 HISTORY OF CESAREAN SECTION: ICD-10-CM

## 2022-11-15 DIAGNOSIS — O99.013 ANEMIA DURING PREGNANCY IN THIRD TRIMESTER: ICD-10-CM

## 2022-11-15 DIAGNOSIS — O09.299 HISTORY OF PRE-ECLAMPSIA IN PRIOR PREGNANCY, CURRENTLY PREGNANT: ICD-10-CM

## 2022-11-15 DIAGNOSIS — D57.3 SICKLE CELL TRAIT: ICD-10-CM

## 2022-11-15 DIAGNOSIS — O26.899 RH NEGATIVE STATE IN ANTEPARTUM PERIOD: ICD-10-CM

## 2022-11-15 DIAGNOSIS — Z67.91 RH NEGATIVE STATE IN ANTEPARTUM PERIOD: ICD-10-CM

## 2022-11-15 DIAGNOSIS — O09.529 ANTEPARTUM MULTIGRAVIDA OF ADVANCED MATERNAL AGE: ICD-10-CM

## 2022-11-15 DIAGNOSIS — O09.90 SUPERVISION OF HIGH RISK PREGNANCY, ANTEPARTUM: Primary | ICD-10-CM

## 2022-11-15 PROCEDURE — 99213 OFFICE O/P EST LOW 20 MIN: CPT | Mod: PBBFAC,TH | Performed by: OBSTETRICS & GYNECOLOGY

## 2022-11-15 PROCEDURE — 99213 OFFICE O/P EST LOW 20 MIN: CPT | Mod: TH,S$PBB,, | Performed by: OBSTETRICS & GYNECOLOGY

## 2022-11-15 PROCEDURE — 99213 PR OFFICE/OUTPT VISIT, EST, LEVL III, 20-29 MIN: ICD-10-PCS | Mod: TH,S$PBB,, | Performed by: OBSTETRICS & GYNECOLOGY

## 2022-11-15 PROCEDURE — 99999 PR PBB SHADOW E&M-EST. PATIENT-LVL III: CPT | Mod: PBBFAC,,, | Performed by: OBSTETRICS & GYNECOLOGY

## 2022-11-15 PROCEDURE — 87086 URINE CULTURE/COLONY COUNT: CPT | Performed by: OBSTETRICS & GYNECOLOGY

## 2022-11-15 PROCEDURE — 99999 PR PBB SHADOW E&M-EST. PATIENT-LVL III: ICD-10-PCS | Mod: PBBFAC,,, | Performed by: OBSTETRICS & GYNECOLOGY

## 2022-11-15 RX ORDER — FERROUS SULFATE 325(65) MG
325 TABLET, DELAYED RELEASE (ENTERIC COATED) ORAL DAILY
Qty: 180 TABLET | Refills: 1 | Status: ON HOLD | OUTPATIENT
Start: 2022-11-15 | End: 2023-02-06

## 2022-11-17 ENCOUNTER — HOSPITAL ENCOUNTER (OUTPATIENT)
Dept: OBSTETRICS AND GYNECOLOGY | Facility: HOSPITAL | Age: 32
Discharge: HOME | End: 2022-11-17
Attending: OBSTETRICS & GYNECOLOGY
Payer: MEDICAID

## 2022-11-17 DIAGNOSIS — Z67.91 RH NEGATIVE STATE IN ANTEPARTUM PERIOD: ICD-10-CM

## 2022-11-17 DIAGNOSIS — J45.909 ASTHMA DURING PREGNANCY: ICD-10-CM

## 2022-11-17 DIAGNOSIS — O09.90 SUPERVISION OF HIGH RISK PREGNANCY, ANTEPARTUM: ICD-10-CM

## 2022-11-17 DIAGNOSIS — J45.909 ASTHMA AFFECTING PREGNANCY IN THIRD TRIMESTER: Primary | ICD-10-CM

## 2022-11-17 DIAGNOSIS — O26.899 RH NEGATIVE STATE IN ANTEPARTUM PERIOD: ICD-10-CM

## 2022-11-17 DIAGNOSIS — O99.513 ASTHMA AFFECTING PREGNANCY IN THIRD TRIMESTER: Primary | ICD-10-CM

## 2022-11-17 DIAGNOSIS — O99.519 ASTHMA DURING PREGNANCY: ICD-10-CM

## 2022-11-17 DIAGNOSIS — B37.9 YEAST INFECTION: Primary | ICD-10-CM

## 2022-11-17 LAB
BACTERIA UR CULT: NORMAL
INJECT RH IG VOL PATIENT: NORMAL ML

## 2022-11-17 PROCEDURE — 96372 THER/PROPH/DIAG INJ SC/IM: CPT

## 2022-11-17 PROCEDURE — 63600519 RHOGAM PHARM REV CODE 636 ALT 250 W HCPCS: Performed by: OBSTETRICS & GYNECOLOGY

## 2022-11-17 RX ORDER — ALBUTEROL SULFATE 90 UG/1
2 AEROSOL, METERED RESPIRATORY (INHALATION) EVERY 6 HOURS PRN
Qty: 8 G | Refills: 1 | Status: SHIPPED | OUTPATIENT
Start: 2022-11-17 | End: 2022-12-05 | Stop reason: SDUPTHER

## 2022-11-17 RX ORDER — TERCONAZOLE 8 MG/G
1 CREAM VAGINAL NIGHTLY
Qty: 20 G | Refills: 0 | Status: SHIPPED | OUTPATIENT
Start: 2022-11-17 | End: 2022-11-20

## 2022-11-17 RX ORDER — FLUCONAZOLE 150 MG/1
TABLET ORAL
Status: ON HOLD | COMMUNITY
Start: 2022-10-20 | End: 2023-02-06

## 2022-11-17 RX ORDER — ALBUTEROL SULFATE 2.5 MG/.5ML
2.5 SOLUTION RESPIRATORY (INHALATION) EVERY 4 HOURS PRN
Qty: 10 EACH | Refills: 1 | Status: SHIPPED | OUTPATIENT
Start: 2022-11-17 | End: 2023-11-17

## 2022-11-17 RX ADMIN — HUMAN RHO(D) IMMUNE GLOBULIN 300 MCG: 300 INJECTION, SOLUTION INTRAMUSCULAR at 01:11

## 2022-11-18 ENCOUNTER — PATIENT MESSAGE (OUTPATIENT)
Dept: MATERNAL FETAL MEDICINE | Facility: CLINIC | Age: 32
End: 2022-11-18
Payer: MEDICAID

## 2022-11-23 ENCOUNTER — TELEPHONE (OUTPATIENT)
Dept: OBSTETRICS AND GYNECOLOGY | Facility: CLINIC | Age: 32
End: 2022-11-23
Payer: MEDICAID

## 2022-11-29 ENCOUNTER — TELEPHONE (OUTPATIENT)
Dept: OBSTETRICS AND GYNECOLOGY | Facility: CLINIC | Age: 32
End: 2022-11-29
Payer: MEDICAID

## 2022-11-29 ENCOUNTER — HOSPITAL ENCOUNTER (OUTPATIENT)
Facility: HOSPITAL | Age: 32
Discharge: HOME | End: 2022-11-29
Attending: OBSTETRICS & GYNECOLOGY | Admitting: OBSTETRICS & GYNECOLOGY
Payer: MEDICAID

## 2022-11-29 VITALS
WEIGHT: 257 LBS | BODY MASS INDEX: 40.34 KG/M2 | DIASTOLIC BLOOD PRESSURE: 71 MMHG | RESPIRATION RATE: 18 BRPM | OXYGEN SATURATION: 97 % | SYSTOLIC BLOOD PRESSURE: 128 MMHG | TEMPERATURE: 98 F | HEIGHT: 67 IN | HEART RATE: 96 BPM

## 2022-11-29 DIAGNOSIS — O26.899 HEADACHE IN PREGNANCY: ICD-10-CM

## 2022-11-29 DIAGNOSIS — R51.9 HEADACHE IN PREGNANCY: ICD-10-CM

## 2022-11-29 LAB
ALBUMIN SERPL BCP-MCNC: 2.9 G/DL (ref 3.5–5.2)
ALP SERPL-CCNC: 96 U/L (ref 55–135)
ALT SERPL W/O P-5'-P-CCNC: 7 U/L (ref 10–44)
ANION GAP SERPL CALC-SCNC: 9 MMOL/L (ref 8–16)
AST SERPL-CCNC: 11 U/L (ref 10–40)
BASOPHILS # BLD AUTO: 0.04 K/UL (ref 0–0.2)
BASOPHILS NFR BLD: 0.6 % (ref 0–1.9)
BILIRUB SERPL-MCNC: 0.4 MG/DL (ref 0.1–1)
BUN SERPL-MCNC: 5 MG/DL (ref 6–20)
CALCIUM SERPL-MCNC: 9.2 MG/DL (ref 8.7–10.5)
CHLORIDE SERPL-SCNC: 105 MMOL/L (ref 95–110)
CO2 SERPL-SCNC: 21 MMOL/L (ref 23–29)
CREAT SERPL-MCNC: 0.7 MG/DL (ref 0.5–1.4)
CREAT UR-MCNC: 127 MG/DL (ref 15–325)
DIFFERENTIAL METHOD: ABNORMAL
EOSINOPHIL # BLD AUTO: 0.4 K/UL (ref 0–0.5)
EOSINOPHIL NFR BLD: 6 % (ref 0–8)
ERYTHROCYTE [DISTWIDTH] IN BLOOD BY AUTOMATED COUNT: 14.4 % (ref 11.5–14.5)
EST. GFR  (NO RACE VARIABLE): >60 ML/MIN/1.73 M^2
GLUCOSE SERPL-MCNC: 110 MG/DL (ref 70–110)
HCT VFR BLD AUTO: 28.8 % (ref 37–48.5)
HGB BLD-MCNC: 9.8 G/DL (ref 12–16)
IMM GRANULOCYTES # BLD AUTO: 0.06 K/UL (ref 0–0.04)
IMM GRANULOCYTES NFR BLD AUTO: 0.9 % (ref 0–0.5)
LDH SERPL L TO P-CCNC: 314 U/L (ref 110–260)
LYMPHOCYTES # BLD AUTO: 1.4 K/UL (ref 1–4.8)
LYMPHOCYTES NFR BLD: 19.9 % (ref 18–48)
MCH RBC QN AUTO: 29.5 PG (ref 27–31)
MCHC RBC AUTO-ENTMCNC: 34 G/DL (ref 32–36)
MCV RBC AUTO: 87 FL (ref 82–98)
MONOCYTES # BLD AUTO: 0.4 K/UL (ref 0.3–1)
MONOCYTES NFR BLD: 5.7 % (ref 4–15)
NEUTROPHILS # BLD AUTO: 4.7 K/UL (ref 1.8–7.7)
NEUTROPHILS NFR BLD: 66.9 % (ref 38–73)
NRBC BLD-RTO: 0 /100 WBC
PLATELET # BLD AUTO: 220 K/UL (ref 150–450)
PMV BLD AUTO: 10.9 FL (ref 9.2–12.9)
POTASSIUM SERPL-SCNC: 3.4 MMOL/L (ref 3.5–5.1)
PROT SERPL-MCNC: 6.9 G/DL (ref 6–8.4)
PROT UR-MCNC: 13 MG/DL
PROT/CREAT UR: 0.1 MG/G{CREAT} (ref 0–0.2)
RBC # BLD AUTO: 3.32 M/UL (ref 4–5.4)
SODIUM SERPL-SCNC: 135 MMOL/L (ref 136–145)
URATE SERPL-MCNC: 4.6 MG/DL (ref 2.4–5.7)
WBC # BLD AUTO: 7.02 K/UL (ref 3.9–12.7)

## 2022-11-29 PROCEDURE — 36415 COLL VENOUS BLD VENIPUNCTURE: CPT | Performed by: OBSTETRICS & GYNECOLOGY

## 2022-11-29 PROCEDURE — 80053 COMPREHEN METABOLIC PANEL: CPT | Performed by: OBSTETRICS & GYNECOLOGY

## 2022-11-29 PROCEDURE — 99211 OFF/OP EST MAY X REQ PHY/QHP: CPT | Mod: 25

## 2022-11-29 PROCEDURE — 59025 FETAL NON-STRESS TEST: CPT

## 2022-11-29 PROCEDURE — 25000003 PHARM REV CODE 250: Performed by: OBSTETRICS & GYNECOLOGY

## 2022-11-29 PROCEDURE — 63600175 PHARM REV CODE 636 W HCPCS: Performed by: OBSTETRICS & GYNECOLOGY

## 2022-11-29 PROCEDURE — 84550 ASSAY OF BLOOD/URIC ACID: CPT | Performed by: OBSTETRICS & GYNECOLOGY

## 2022-11-29 PROCEDURE — 83615 LACTATE (LD) (LDH) ENZYME: CPT | Performed by: OBSTETRICS & GYNECOLOGY

## 2022-11-29 PROCEDURE — 85025 COMPLETE CBC W/AUTO DIFF WBC: CPT | Performed by: OBSTETRICS & GYNECOLOGY

## 2022-11-29 PROCEDURE — 84156 ASSAY OF PROTEIN URINE: CPT | Performed by: OBSTETRICS & GYNECOLOGY

## 2022-11-29 RX ORDER — ACETAMINOPHEN 325 MG/1
650 TABLET ORAL EVERY 6 HOURS PRN
Status: CANCELLED | OUTPATIENT
Start: 2022-11-29

## 2022-11-29 RX ORDER — SODIUM CHLORIDE 0.9 % (FLUSH) 0.9 %
10 SYRINGE (ML) INJECTION
Status: CANCELLED | OUTPATIENT
Start: 2022-11-29

## 2022-11-29 RX ORDER — MUPIROCIN 20 MG/G
OINTMENT TOPICAL 2 TIMES DAILY
Status: CANCELLED | OUTPATIENT
Start: 2022-11-29 | End: 2022-12-04

## 2022-11-29 RX ORDER — DIPHENHYDRAMINE HYDROCHLORIDE 50 MG/ML
25 INJECTION INTRAMUSCULAR; INTRAVENOUS EVERY 4 HOURS PRN
Status: CANCELLED | OUTPATIENT
Start: 2022-11-29

## 2022-11-29 RX ORDER — PRENATAL WITH FERROUS FUM AND FOLIC ACID 3080; 920; 120; 400; 22; 1.84; 3; 20; 10; 1; 12; 200; 27; 25; 2 [IU]/1; [IU]/1; MG/1; [IU]/1; MG/1; MG/1; MG/1; MG/1; MG/1; MG/1; UG/1; MG/1; MG/1; MG/1; MG/1
1 TABLET ORAL DAILY
Status: CANCELLED | OUTPATIENT
Start: 2022-11-30

## 2022-11-29 RX ORDER — BETAMETHASONE SODIUM PHOSPHATE AND BETAMETHASONE ACETATE 3; 3 MG/ML; MG/ML
12 INJECTION, SUSPENSION INTRA-ARTICULAR; INTRALESIONAL; INTRAMUSCULAR; SOFT TISSUE ONCE
Status: COMPLETED | OUTPATIENT
Start: 2022-11-29 | End: 2022-11-29

## 2022-11-29 RX ORDER — BUTALBITAL, ACETAMINOPHEN AND CAFFEINE 50; 325; 40 MG/1; MG/1; MG/1
2 TABLET ORAL ONCE
Status: COMPLETED | OUTPATIENT
Start: 2022-11-29 | End: 2022-11-29

## 2022-11-29 RX ORDER — AMOXICILLIN 250 MG
1 CAPSULE ORAL NIGHTLY PRN
Status: CANCELLED | OUTPATIENT
Start: 2022-11-29

## 2022-11-29 RX ORDER — SIMETHICONE 80 MG
1 TABLET,CHEWABLE ORAL EVERY 6 HOURS PRN
Status: CANCELLED | OUTPATIENT
Start: 2022-11-29

## 2022-11-29 RX ORDER — ONDANSETRON 8 MG/1
8 TABLET, ORALLY DISINTEGRATING ORAL EVERY 8 HOURS PRN
Status: CANCELLED | OUTPATIENT
Start: 2022-11-29

## 2022-11-29 RX ORDER — PROCHLORPERAZINE EDISYLATE 5 MG/ML
5 INJECTION INTRAMUSCULAR; INTRAVENOUS EVERY 6 HOURS PRN
Status: CANCELLED | OUTPATIENT
Start: 2022-11-29

## 2022-11-29 RX ORDER — DIPHENHYDRAMINE HCL 25 MG
25 CAPSULE ORAL EVERY 4 HOURS PRN
Status: CANCELLED | OUTPATIENT
Start: 2022-11-29

## 2022-11-29 RX ADMIN — BETAMETHASONE SODIUM PHOSPHATE AND BETAMETHASONE ACETATE 12 MG: 3; 3 INJECTION, SUSPENSION INTRA-ARTICULAR; INTRALESIONAL; INTRAMUSCULAR at 05:11

## 2022-11-29 RX ADMIN — BUTALBITAL, ACETAMINOPHEN AND CAFFEINE 2 TABLET: 50; 325; 40 TABLET ORAL at 02:11

## 2022-11-29 NOTE — NURSING
Patient arrived with complaints of headache since yesterday morning not relived with tylenol. Reports taking her BP at home and read back 3 BP >140 systolic. Plan of care discussed and patient sent to BR to change and collect urine specimen. Significant other & toddler sent to waiting area.

## 2022-11-29 NOTE — TELEPHONE ENCOUNTER
Spoke with patient and informed Dr. Petersen recommend that she goes to L&D to rule out preeclampsia. Patient voiced understanding.    ----- Message from Ryland Kingsley sent at 11/29/2022 10:47 AM CST -----  Type: Patient Call Back    Who called:Self     What is the request in detail: Calling about Hbp asking what should she do     Can the clinic reply by [facility]? No    Would the patient rather a call back or a response via [facility]? Call    Best call back number: [phone]

## 2022-11-29 NOTE — NURSING
1440-RN call to MD Petersen with report.  MD gave order for 2 PO Fiorcet once now and Pre-E labs.    1609-RN call to MD Trinity Munoz with update on BP and lab results.  MD to call MFM to review case and call back with orders.     1700-MD Petersen on unit to see pt.  RN to cycle BP q30min [id number], regular diet, give BMZ IM now, and call MD in 2 hours with BP results.  If elevated, pt to stay over night for BP medication and monitoring.

## 2022-11-30 ENCOUNTER — HOSPITAL ENCOUNTER (OUTPATIENT)
Facility: HOSPITAL | Age: 32
Discharge: HOME | End: 2022-11-30
Attending: OBSTETRICS & GYNECOLOGY | Admitting: OBSTETRICS & GYNECOLOGY
Payer: MEDICAID

## 2022-11-30 VITALS — DIASTOLIC BLOOD PRESSURE: 85 MMHG | SYSTOLIC BLOOD PRESSURE: 115 MMHG | HEART RATE: 117 BPM | RESPIRATION RATE: 20 BRPM

## 2022-11-30 DIAGNOSIS — Z34.90 PREGNANCY WITH ONE FETUS: ICD-10-CM

## 2022-11-30 PROCEDURE — 96372 THER/PROPH/DIAG INJ SC/IM: CPT

## 2022-11-30 PROCEDURE — 63600175 PHARM REV CODE 636 W HCPCS: Performed by: OBSTETRICS & GYNECOLOGY

## 2022-11-30 PROCEDURE — 99211 OFF/OP EST MAY X REQ PHY/QHP: CPT

## 2022-11-30 RX ORDER — BETAMETHASONE SODIUM PHOSPHATE AND BETAMETHASONE ACETATE 3; 3 MG/ML; MG/ML
12 INJECTION, SUSPENSION INTRA-ARTICULAR; INTRALESIONAL; INTRAMUSCULAR; SOFT TISSUE ONCE
Status: COMPLETED | OUTPATIENT
Start: 2022-11-30 | End: 2022-11-30

## 2022-11-30 RX ADMIN — BETAMETHASONE ACETATE AND BETAMETHASONE SODIUM PHOSPHATE 12 MG: 3; 3 INJECTION, SUSPENSION INTRA-ARTICULAR; INTRALESIONAL; INTRAMUSCULAR; SOFT TISSUE at 08:11

## 2022-11-30 NOTE — TREATMENT PLAN
Dr Petersen called and notified of recent BP's and status of no headache at this time;order rec'd for dc and for pt to return tomorrow evening for second steroid shot and to make appt for the 6th in the office

## 2022-11-30 NOTE — DISCHARGE INSTRUCTIONS
Home Undelivered Discharge Instructions    After Discharge Orders:    Future Appointments   Date Time Provider Department Center   12/14/2022 10:00 AM ULTRASOUND ROOM 1, [facility] MATERNAL FETAL MEDICINE [facility]   12/14/2022  1:00 PM Avila Herman MD [facility]   12/28/2022 10:30 AM Avila Herman MD [facility]   1/11/2023  1:00 PM Avila Herman MD [facility]   1/19/2023  1:00 PM Avila Herman MD [facility] [place]   1/26/2023  1:00 PM Avila Herman MD [facility]   2/2/2023  1:00 PM Avila Herman MD [facility]   2/9/2023  1:00 PM Avila Herman MD [facility] [place] [facility]       Return to Labor and Delivery tomorrow evening at 530PM for second steroid injection. Call Dr's office to make scheduled appointment to see Dr Petersen for 12-    Current Discharge Medication List        CONTINUE these medications which have NOT CHANGED    Details   albuterol (PROVENTIL HFA) 90 mcg/actuation inhaler Inhale 2 puffs into the lungs every 6 (six) hours as needed for Wheezing. Rescue  Qty: 8 g, Refills: 1    Associated Diagnoses: Asthma affecting pregnancy in third trimester      albuterol sulfate 2.5 mg/0.5 mL Nebu Take 2.5 mg by nebulization every 4 (four) hours as needed (asthma exacerbation).  Qty: 10 each, Refills: 1    Associated Diagnoses: Asthma during pregnancy      aspirin (ECOTRIN) 81 MG EC tablet Take 1 tablet (81 mg total) by mouth once daily.  Qty: 180 tablet, Refills: 3    Associated Diagnoses: Late prenatal care affecting pregnancy, antepartum; Hx of preeclampsia, prior pregnancy, currently pregnant      ferrous sulfate 325 (65 FE) MG EC tablet Take 1 tablet (325 mg total) by mouth once daily.  Qty: 180 tablet, Refills: 1    Associated Diagnoses: Anemia during pregnancy in third trimester      fluconazole (DIFLUCAN) 150 MG Tab TAKE 1 TABLET (150 MG TOTAL) BY MOUTH ONCE.  REFILL AND RE-DOSE IF SYMPTOMS RECUR FOR 1 DOSE      PNV,calcium 72-iron-folic acid (PRENATAL VITAMIN PLUS LOW IRON) 27 mg iron- 1 mg Tab Take 1 tablet (1 each total) by mouth once daily.  Qty: 120 tablet, Refills: 1    Associated Diagnoses: Pregnancy confirmed by positive urine test; Late prenatal care affecting pregnancy, antepartum                     Diet:  normal diet as tolerated    Rest: normal activity as tolerated    Other instructions: Do kick counts once a day on your baby. Choose the time of day your baby is most active. Get in a comfortable lying or sitting position and time how long it takes to feel 10 kicks, twists, turns, swishes, or rolls. Call your physician or midwife if there have not been 10 kicks in 1 hours    Call physician or midwife, return to Labor and Delivery, call 911, or go to the nearest Emergency Room if: increased leakage or fluid, contractions more than  8 per  1 hour, decreased fetal movement, persistent low back pain or cramping, bleeding from vaginal area, persistent headache, or vision change

## 2022-12-01 NOTE — TREATMENT PLAN
Pt reminded to make dec 6th appt with dr garcia;verbal recall noted with dc instructions;ambulated off unit with son in [facility]

## 2022-12-01 NOTE — PROGRESS NOTES
Complaints today: Ms. Allen reports that she is doing well with no complaints. Normal fetal movements with no vaginal bleeding, LOF or contractions at this time.     /80   Wt 117 kg (257 lb 15 oz)   LMP 2022 (Exact Date)   BMI 40.40 kg/m²     32 y.o., at 30w2d by Estimated Date of Delivery: 23  Patient Active Problem List   Diagnosis    Mild intermittent asthma without complication    HSV (herpes simplex virus) anogenital infection    Sickle cell trait; FOB needs screening    Marijuana abuse in remission    S/P  section    Advanced maternal age in multigravida, second trimester    Supervision of high risk pregnancy, antepartum    History of  section     OB History    Para Term  AB Living   5 2 2   2 2   SAB IAB Ectopic Multiple Live Births     2     2      # Outcome Date GA Lbr Zack/2nd Weight Sex Delivery Anes PTL Lv   5 Current            4 Term 10/22/21 41w1d  3.459 kg (7 lb 10 oz) M CS-LTranv Spinal N CLARIBEL      Complications: Gestational hypertension without significant proteinuria during pregnancy in third trimester, antepartum   3 Term 06 39w0d  3.487 kg (7 lb 11 oz) M CS-LTranv  N CLARIBEL      Birth Comments: failed IOL, preE      Complications: Preeclampsia, third trimester   2 IAB            1 IAB               Obstetric Comments   Gynhx: reg   H/o genital herpes, chlamydia       Dating reviewed    Allergies and problem list reviewed and updated    Medical and surgical history reviewed    Prenatal labs reviewed and updated    Physical Exam:  ABD: soft, gravid, nontender, 28cm    Assessment:  Ole Saavedra was seen today for initial prenatal visit.    Diagnoses and all orders for this visit:    Supervision of high risk pregnancy, antepartum  -     OB Glucose Screen; Future  -     Type & Screen; Future  -     Prepare Rh Immune Globulin; Future    Antepartum multigravida of advanced maternal age    History of  section    Sickle cell trait; FOB  needs screening  -     Urine culture    Anemia during pregnancy in third trimester  -     ferrous sulfate 325 (65 FE) MG EC tablet; Take 1 tablet (325 mg total) by mouth once daily.    Rh negative state in antepartum period  -     Type & Screen; Future  -     Prepare Rh Immune Globulin; Future    History of pre-eclampsia in prior pregnancy, currently pregnant        Orders Placed This Encounter   Procedures    Urine culture    OB Glucose Screen    Type & Screen    Prepare Rh Immune Globulin       Follow up in about 2 weeks (around 11/29/2022) for Routine OB.

## 2022-12-01 NOTE — TREATMENT PLAN
Pt arrived for sched steroid injection with child; denies headache,visual disturbances, and epigastric pain;states +fm;denies UC, vag bleeding and leaking of fluid;bp taken

## 2022-12-08 ENCOUNTER — TELEPHONE (OUTPATIENT)
Dept: OBSTETRICS AND GYNECOLOGY | Facility: CLINIC | Age: 32
End: 2022-12-08
Payer: MEDICAID

## 2022-12-08 NOTE — TELEPHONE ENCOUNTER
Spoke with patient and informed that Dr. Petersen do not have any available appointments for today or Friday. Scheduled on 12/09 at 10:40am with NP-Qasim Mendez. Patient voiced understanding.      ----- Message from Ryland Kingsley sent at 12/8/2022  8:29 AM CST -----  Type:  Sooner Appointment Request    Patient is requesting a sooner appointment.  Patient declined first available appointment listed as well as another facility and provider .  Patient will not accept being placed on the waitlist and is requesting a message be sent to doctor.    Name of Caller: Self     When is the first available appointment? 03/08    Symptoms: Hosp F/U     Would the patient rather a call back or a response via [facility]? Call     Best Call Back Number: [phone]     Additional Information: PT said she left a message on the portal on her end , trying to be seen today stated she was supposed to be seen on the 6th

## 2022-12-09 ENCOUNTER — ROUTINE PRENATAL (OUTPATIENT)
Dept: OBSTETRICS AND GYNECOLOGY | Facility: CLINIC | Age: 32
End: 2022-12-09
Payer: MEDICAID

## 2022-12-09 VITALS
BODY MASS INDEX: 41.02 KG/M2 | DIASTOLIC BLOOD PRESSURE: 78 MMHG | WEIGHT: 261.94 LBS | SYSTOLIC BLOOD PRESSURE: 120 MMHG

## 2022-12-09 DIAGNOSIS — O09.90 SUPERVISION OF HIGH RISK PREGNANCY, ANTEPARTUM: Primary | ICD-10-CM

## 2022-12-09 DIAGNOSIS — Z98.891 HISTORY OF CESAREAN SECTION: ICD-10-CM

## 2022-12-09 DIAGNOSIS — Z3A.31 31 WEEKS GESTATION OF PREGNANCY: ICD-10-CM

## 2022-12-09 PROCEDURE — 99213 OFFICE O/P EST LOW 20 MIN: CPT | Mod: TH,S$PBB,,

## 2022-12-09 PROCEDURE — 99212 OFFICE O/P EST SF 10 MIN: CPT | Mod: PBBFAC,TH

## 2022-12-09 PROCEDURE — 99999 PR PBB SHADOW E&M-EST. PATIENT-LVL II: CPT | Mod: PBBFAC,,,

## 2022-12-09 PROCEDURE — 99999 PR PBB SHADOW E&M-EST. PATIENT-LVL II: ICD-10-PCS | Mod: PBBFAC,,,

## 2022-12-09 PROCEDURE — 99213 PR OFFICE/OUTPT VISIT, EST, LEVL III, 20-29 MIN: ICD-10-PCS | Mod: TH,S$PBB,,

## 2022-12-09 RX ORDER — TERCONAZOLE 4 MG/G
1 CREAM VAGINAL NIGHTLY
Qty: 45 G | Refills: 0 | Status: SHIPPED | OUTPATIENT
Start: 2022-12-09 | End: 2022-12-16

## 2022-12-09 NOTE — PROGRESS NOTES
31w3d. Pt reports vaginal itching. States she was dx with a yeast infection but her pharmacy didn't had to order the cream and it has been over 2 weeks. She also reports being seen in ED last week for a headache that was not relieved by Tylenol. Denies headache today.   Denies contractions, vaginal bleeding, or leakage of fluid.  Reports adequate fetal movement.    /78   Wt 118.8 kg (261 lb 14.5 oz)   LMP 2022 (Exact Date)   BMI 41.02 kg/m²     32 y.o., at 30w2d by Estimated Date of Delivery: 23  Patient Active Problem List   Diagnosis    Mild intermittent asthma without complication    HSV (herpes simplex virus) anogenital infection    Sickle cell trait; FOB needs screening    Marijuana abuse in remission    S/P  section    Advanced maternal age in multigravida, second trimester    Supervision of high risk pregnancy, antepartum    History of  section     OB History    Para Term  AB Living   5 2 2   2 2   SAB IAB Ectopic Multiple Live Births     2     2      # Outcome Date GA Lbr Zack/2nd Weight Sex Delivery Anes PTL Lv   5 Current            4 Term 10/22/21 41w1d  3.459 kg (7 lb 10 oz) M CS-LTranv Spinal N CLARIBEL      Complications: Gestational hypertension without significant proteinuria during pregnancy in third trimester, antepartum   3 Term 06 39w0d  3.487 kg (7 lb 11 oz) M CS-LTranv  N CLARIBEL      Birth Comments: failed IOL, preE      Complications: Preeclampsia, third trimester   2 IAB            1 IAB               Obstetric Comments   Gynhx: reg   H/o genital herpes, chlamydia       Dating reviewed    Allergies and problem list reviewed and updated    Medical and surgical history reviewed    Prenatal labs reviewed and updated    Physical Exam:  ABD: soft, gravid, nontender, 31cm    Assessment:  1. Supervision of high risk pregnancy, antepartum  Rhogam given  Start Valtrex at 36 weeks  MFM ultrasound next week (32 weeks)    2. History of   section  Rpt C/S    3. 31 weeks gestation of pregnancy    F/U 2 weeks. Next visit: routine care

## 2022-12-13 ENCOUNTER — PATIENT MESSAGE (OUTPATIENT)
Dept: MATERNAL FETAL MEDICINE | Facility: CLINIC | Age: 32
End: 2022-12-13
Payer: MEDICAID

## 2022-12-14 ENCOUNTER — ROUTINE PRENATAL (OUTPATIENT)
Dept: OBSTETRICS AND GYNECOLOGY | Facility: CLINIC | Age: 32
End: 2022-12-14
Payer: MEDICAID

## 2022-12-14 ENCOUNTER — PROCEDURE VISIT (OUTPATIENT)
Dept: MATERNAL FETAL MEDICINE | Facility: CLINIC | Age: 32
End: 2022-12-14
Payer: MEDICAID

## 2022-12-14 VITALS
SYSTOLIC BLOOD PRESSURE: 138 MMHG | BODY MASS INDEX: 41.02 KG/M2 | DIASTOLIC BLOOD PRESSURE: 82 MMHG | WEIGHT: 261.94 LBS

## 2022-12-14 DIAGNOSIS — Z36.89 ENCOUNTER FOR ULTRASOUND TO ASSESS FETAL GROWTH: ICD-10-CM

## 2022-12-14 DIAGNOSIS — O99.013 ANEMIA DURING PREGNANCY IN THIRD TRIMESTER: ICD-10-CM

## 2022-12-14 DIAGNOSIS — O09.90 SUPERVISION OF HIGH RISK PREGNANCY, ANTEPARTUM: Primary | ICD-10-CM

## 2022-12-14 PROCEDURE — 99999 PR PBB SHADOW E&M-EST. PATIENT-LVL III: CPT | Mod: PBBFAC,,, | Performed by: OBSTETRICS & GYNECOLOGY

## 2022-12-14 PROCEDURE — 76816 OB US FOLLOW-UP PER FETUS: CPT | Mod: PBBFAC,PO | Performed by: OBSTETRICS & GYNECOLOGY

## 2022-12-14 PROCEDURE — 99213 OFFICE O/P EST LOW 20 MIN: CPT | Mod: PBBFAC,TH | Performed by: OBSTETRICS & GYNECOLOGY

## 2022-12-14 PROCEDURE — 99999 PR PBB SHADOW E&M-EST. PATIENT-LVL III: ICD-10-PCS | Mod: PBBFAC,,, | Performed by: OBSTETRICS & GYNECOLOGY

## 2022-12-14 PROCEDURE — 99213 OFFICE O/P EST LOW 20 MIN: CPT | Mod: TH,S$PBB,, | Performed by: OBSTETRICS & GYNECOLOGY

## 2022-12-14 PROCEDURE — 76816 US MFM PROCEDURE (VIEWPOINT): ICD-10-PCS | Mod: 26,S$PBB,, | Performed by: OBSTETRICS & GYNECOLOGY

## 2022-12-14 PROCEDURE — 99213 PR OFFICE/OUTPT VISIT, EST, LEVL III, 20-29 MIN: ICD-10-PCS | Mod: TH,S$PBB,, | Performed by: OBSTETRICS & GYNECOLOGY

## 2022-12-14 NOTE — PROGRESS NOTES
Complaints today: Ms. Allen reports that she is doing well with no complaints. Normal fetal movements with no vaginal bleeding, LOF or contractions at this time.       /82   Wt 118.8 kg (261 lb 14.5 oz)   LMP 2022 (Exact Date)   BMI 41.02 kg/m²     32 y.o., at 32w1d by Estimated Date of Delivery: 23  Patient Active Problem List   Diagnosis    Mild intermittent asthma without complication    HSV (herpes simplex virus) anogenital infection    Sickle cell trait; FOB needs screening    Marijuana abuse in remission    S/P  section    Advanced maternal age in multigravida, second trimester    Supervision of high risk pregnancy, antepartum    History of  section     OB History    Para Term  AB Living   5 2 2   2 2   SAB IAB Ectopic Multiple Live Births     2     2      # Outcome Date GA Lbr Zack/2nd Weight Sex Delivery Anes PTL Lv   5 Current            4 Term 10/22/21 41w1d  3.459 kg (7 lb 10 oz) M CS-LTranv Spinal N CLARIBEL      Complications: Gestational hypertension without significant proteinuria during pregnancy in third trimester, antepartum   3 Term 06 39w0d  3.487 kg (7 lb 11 oz) M CS-LTranv  N CLARIBEL      Birth Comments: failed IOL, preE      Complications: Preeclampsia, third trimester   2 IAB            1 IAB               Obstetric Comments   Gynhx: reg   H/o genital herpes, chlamydia       Dating reviewed    Allergies and problem list reviewed and updated    Medical and surgical history reviewed    Prenatal labs reviewed and updated    Physical Exam:  ABD: soft, gravid, nontender, 32cm    Assessment:  Ole Saavedra was seen today for routine prenatal visit.    Diagnoses and all orders for this visit:    Supervision of high risk pregnancy, antepartum  - Doing well  - Reviewed MFM US done today    Anemia during pregnancy in third trimester  - Ferrous sulfate      No orders of the defined types were placed in this encounter.      Follow up in about 2 weeks  (around 12/28/2022) for Routine OB.

## 2022-12-28 ENCOUNTER — ROUTINE PRENATAL (OUTPATIENT)
Dept: OBSTETRICS AND GYNECOLOGY | Facility: CLINIC | Age: 32
End: 2022-12-28
Payer: MEDICAID

## 2022-12-28 VITALS
DIASTOLIC BLOOD PRESSURE: 80 MMHG | WEIGHT: 263.44 LBS | SYSTOLIC BLOOD PRESSURE: 138 MMHG | BODY MASS INDEX: 41.26 KG/M2

## 2022-12-28 DIAGNOSIS — O09.90 SUPERVISION OF HIGH RISK PREGNANCY, ANTEPARTUM: Primary | ICD-10-CM

## 2022-12-28 DIAGNOSIS — Z98.891 S/P CESAREAN SECTION: ICD-10-CM

## 2022-12-28 DIAGNOSIS — A60.9 HSV (HERPES SIMPLEX VIRUS) ANOGENITAL INFECTION: ICD-10-CM

## 2022-12-28 PROCEDURE — 99213 OFFICE O/P EST LOW 20 MIN: CPT | Mod: PBBFAC,TH | Performed by: OBSTETRICS & GYNECOLOGY

## 2022-12-28 PROCEDURE — 99213 PR OFFICE/OUTPT VISIT, EST, LEVL III, 20-29 MIN: ICD-10-PCS | Mod: TH,S$PBB,, | Performed by: OBSTETRICS & GYNECOLOGY

## 2022-12-28 PROCEDURE — 99999 PR PBB SHADOW E&M-EST. PATIENT-LVL III: CPT | Mod: PBBFAC,,, | Performed by: OBSTETRICS & GYNECOLOGY

## 2022-12-28 PROCEDURE — 99213 OFFICE O/P EST LOW 20 MIN: CPT | Mod: TH,S$PBB,, | Performed by: OBSTETRICS & GYNECOLOGY

## 2022-12-28 PROCEDURE — 99999 PR PBB SHADOW E&M-EST. PATIENT-LVL III: ICD-10-PCS | Mod: PBBFAC,,, | Performed by: OBSTETRICS & GYNECOLOGY

## 2022-12-28 NOTE — PROGRESS NOTES
Complaints today: Ms. Allen reports that she is doing well with no complaints. Normal fetal movements with no vaginal bleeding, LOF or contractions at this time.     /80   Wt 119.5 kg (263 lb 7.2 oz)   LMP 2022 (Exact Date)   BMI 41.26 kg/m²     32 y.o., at 34w1d by Estimated Date of Delivery: 23  Patient Active Problem List   Diagnosis    Mild intermittent asthma without complication    HSV (herpes simplex virus) anogenital infection    Sickle cell trait; FOB needs screening    Marijuana abuse in remission    S/P  section    Advanced maternal age in multigravida, second trimester    Supervision of high risk pregnancy, antepartum    History of  section     OB History    Para Term  AB Living   5 2 2   2 2   SAB IAB Ectopic Multiple Live Births     2     2      # Outcome Date GA Lbr Zack/2nd Weight Sex Delivery Anes PTL Lv   5 Current            4 Term 10/22/21 41w1d  3.459 kg (7 lb 10 oz) M CS-LTranv Spinal N CLARIBEL      Complications: Gestational hypertension without significant proteinuria during pregnancy in third trimester, antepartum   3 Term 06 39w0d  3.487 kg (7 lb 11 oz) M CS-LTranv  N CLARIBEL      Birth Comments: failed IOL, preE      Complications: Preeclampsia, third trimester   2 IAB            1 IAB               Obstetric Comments   Gynhx: reg   H/o genital herpes, chlamydia       Dating reviewed    Allergies and problem list reviewed and updated    Medical and surgical history reviewed    Prenatal labs reviewed and updated    Physical Exam:  ABD: soft, gravid, nontender, 35cm    Assessment:  Ole Saavedra was seen today for routine prenatal visit.    Diagnoses and all orders for this visit:    Supervision of high risk pregnancy, antepartum  - Doing well    S/P  section    HSV (herpes simplex virus) anogenital infection  - Valtrex at 36w    No orders of the defined types were placed in this encounter.      Follow up in about 2 weeks (around  1/11/2023) for Routine OB.

## 2023-01-03 ENCOUNTER — PATIENT MESSAGE (OUTPATIENT)
Dept: OTHER | Facility: OTHER | Age: 33
End: 2023-01-03
Payer: MEDICAID

## 2023-01-11 ENCOUNTER — ROUTINE PRENATAL (OUTPATIENT)
Dept: OBSTETRICS AND GYNECOLOGY | Facility: CLINIC | Age: 33
End: 2023-01-11
Payer: MEDICAID

## 2023-01-11 VITALS
BODY MASS INDEX: 42.25 KG/M2 | WEIGHT: 269.75 LBS | DIASTOLIC BLOOD PRESSURE: 80 MMHG | SYSTOLIC BLOOD PRESSURE: 138 MMHG

## 2023-01-11 DIAGNOSIS — B00.9 HERPES SIMPLEX VIRUS TYPE 2 (HSV-2) INFECTION AFFECTING PREGNANCY IN THIRD TRIMESTER: ICD-10-CM

## 2023-01-11 DIAGNOSIS — O09.90 SUPERVISION OF HIGH RISK PREGNANCY, ANTEPARTUM: Primary | ICD-10-CM

## 2023-01-11 DIAGNOSIS — O98.513 HERPES SIMPLEX VIRUS TYPE 2 (HSV-2) INFECTION AFFECTING PREGNANCY IN THIRD TRIMESTER: ICD-10-CM

## 2023-01-11 DIAGNOSIS — Z98.891 HISTORY OF CESAREAN SECTION: ICD-10-CM

## 2023-01-11 DIAGNOSIS — O09.522 ADVANCED MATERNAL AGE IN MULTIGRAVIDA, SECOND TRIMESTER: ICD-10-CM

## 2023-01-11 PROCEDURE — 87491 CHLMYD TRACH DNA AMP PROBE: CPT | Performed by: OBSTETRICS & GYNECOLOGY

## 2023-01-11 PROCEDURE — 99213 PR OFFICE/OUTPT VISIT, EST, LEVL III, 20-29 MIN: ICD-10-PCS | Mod: TH,S$PBB,, | Performed by: OBSTETRICS & GYNECOLOGY

## 2023-01-11 PROCEDURE — 99213 OFFICE O/P EST LOW 20 MIN: CPT | Mod: TH,S$PBB,, | Performed by: OBSTETRICS & GYNECOLOGY

## 2023-01-11 PROCEDURE — 99213 OFFICE O/P EST LOW 20 MIN: CPT | Mod: PBBFAC,TH | Performed by: OBSTETRICS & GYNECOLOGY

## 2023-01-11 PROCEDURE — 99999 PR PBB SHADOW E&M-EST. PATIENT-LVL III: ICD-10-PCS | Mod: PBBFAC,,, | Performed by: OBSTETRICS & GYNECOLOGY

## 2023-01-11 PROCEDURE — 87591 N.GONORRHOEAE DNA AMP PROB: CPT | Performed by: OBSTETRICS & GYNECOLOGY

## 2023-01-11 PROCEDURE — 87081 CULTURE SCREEN ONLY: CPT | Performed by: OBSTETRICS & GYNECOLOGY

## 2023-01-11 PROCEDURE — 99999 PR PBB SHADOW E&M-EST. PATIENT-LVL III: CPT | Mod: PBBFAC,,, | Performed by: OBSTETRICS & GYNECOLOGY

## 2023-01-11 RX ORDER — VALACYCLOVIR HYDROCHLORIDE 1 G/1
1000 TABLET, FILM COATED ORAL DAILY
Qty: 60 TABLET | Refills: 0 | Status: ON HOLD | OUTPATIENT
Start: 2023-01-11 | End: 2023-02-06

## 2023-01-11 NOTE — PROGRESS NOTES
Complaints today: Ms. Allen reports that she is doing well with no complaints. Normal fetal movements with no vaginal bleeding, LOF or contractions at this time.     /80   Wt 122.3 kg (269 lb 11.7 oz)   LMP 2022 (Exact Date)   BMI 42.25 kg/m²     32 y.o., at 36w1d by Estimated Date of Delivery: 23  Patient Active Problem List   Diagnosis    Mild intermittent asthma without complication    HSV (herpes simplex virus) anogenital infection    Sickle cell trait; FOB needs screening    Marijuana abuse in remission    S/P  section    Advanced maternal age in multigravida, second trimester    Supervision of high risk pregnancy, antepartum    History of  section     OB History    Para Term  AB Living   5 2 2   2 2   SAB IAB Ectopic Multiple Live Births     2     2      # Outcome Date GA Lbr Zack/2nd Weight Sex Delivery Anes PTL Lv   5 Current            4 Term 10/22/21 41w1d  3.459 kg (7 lb 10 oz) M CS-LTranv Spinal N CLARIBEL      Complications: Gestational hypertension without significant proteinuria during pregnancy in third trimester, antepartum   3 Term 06 39w0d  3.487 kg (7 lb 11 oz) M CS-LTranv  N CLARIBEL      Birth Comments: failed IOL, preE      Complications: Preeclampsia, third trimester   2 IAB            1 IAB               Obstetric Comments   Gynhx: reg   H/o genital herpes, chlamydia       Dating reviewed    Allergies and problem list reviewed and updated    Medical and surgical history reviewed    Prenatal labs reviewed and updated    Physical Exam:  ABD: soft, gravid, nontender, 36cm    Assessment:  Ole Saavedra was seen today for routine prenatal visit.    Diagnoses and all orders for this visit:    Supervision of high risk pregnancy, antepartum  -     CBC Auto Differential; Future  -     RPR; Future  -     HIV 1/2 Ag/Ab (4th Gen); Future  -     C. trachomatis/N. gonorrhoeae by AMP DNA  -     Strep B Screen, Vaginal / Rectal    History of   section  - Discussed TOLAC vs c/s. Discussed the risk of uterine rupture, TOLAC then  section vs scheduled c/s  - Will await to decide until 38w  Advanced maternal age in multigravida, second trimester    Herpes simplex virus type 2 (HSV-2) infection affecting pregnancy in third trimester  -     valACYclovir (VALTREX) 1000 MG tablet; Take 1 tablet (1,000 mg total) by mouth once daily.        Orders Placed This Encounter   Procedures    C. trachomatis/N. gonorrhoeae by AMP DNA    Strep B Screen, Vaginal / Rectal    CBC Auto Differential    RPR    HIV 1/2 Ag/Ab (4th Gen)       Follow up in about 1 week (around 2023) for Routine OB.

## 2023-01-12 ENCOUNTER — LAB VISIT (OUTPATIENT)
Dept: LAB | Facility: HOSPITAL | Age: 33
End: 2023-01-12
Attending: OBSTETRICS & GYNECOLOGY
Payer: MEDICAID

## 2023-01-12 DIAGNOSIS — O09.90 SUPERVISION OF HIGH RISK PREGNANCY, ANTEPARTUM: ICD-10-CM

## 2023-01-12 LAB
BASOPHILS # BLD AUTO: 0.04 K/UL (ref 0–0.2)
BASOPHILS NFR BLD: 0.6 % (ref 0–1.9)
DIFFERENTIAL METHOD: ABNORMAL
EOSINOPHIL # BLD AUTO: 0.2 K/UL (ref 0–0.5)
EOSINOPHIL NFR BLD: 3.7 % (ref 0–8)
ERYTHROCYTE [DISTWIDTH] IN BLOOD BY AUTOMATED COUNT: 15.9 % (ref 11.5–14.5)
HCT VFR BLD AUTO: 30.1 % (ref 37–48.5)
HGB BLD-MCNC: 9.9 G/DL (ref 12–16)
IMM GRANULOCYTES # BLD AUTO: 0.05 K/UL (ref 0–0.04)
IMM GRANULOCYTES NFR BLD AUTO: 0.8 % (ref 0–0.5)
LYMPHOCYTES # BLD AUTO: 1.5 K/UL (ref 1–4.8)
LYMPHOCYTES NFR BLD: 23.8 % (ref 18–48)
MCH RBC QN AUTO: 28 PG (ref 27–31)
MCHC RBC AUTO-ENTMCNC: 32.9 G/DL (ref 32–36)
MCV RBC AUTO: 85 FL (ref 82–98)
MONOCYTES # BLD AUTO: 0.2 K/UL (ref 0.3–1)
MONOCYTES NFR BLD: 3.8 % (ref 4–15)
NEUTROPHILS # BLD AUTO: 4.2 K/UL (ref 1.8–7.7)
NEUTROPHILS NFR BLD: 67.3 % (ref 38–73)
NRBC BLD-RTO: 0 /100 WBC
PLATELET # BLD AUTO: 218 K/UL (ref 150–450)
PMV BLD AUTO: 10.5 FL (ref 9.2–12.9)
RBC # BLD AUTO: 3.54 M/UL (ref 4–5.4)
WBC # BLD AUTO: 6.25 K/UL (ref 3.9–12.7)

## 2023-01-12 PROCEDURE — 86592 SYPHILIS TEST NON-TREP QUAL: CPT | Performed by: OBSTETRICS & GYNECOLOGY

## 2023-01-12 PROCEDURE — 36415 COLL VENOUS BLD VENIPUNCTURE: CPT | Performed by: OBSTETRICS & GYNECOLOGY

## 2023-01-12 PROCEDURE — 85025 COMPLETE CBC W/AUTO DIFF WBC: CPT | Performed by: OBSTETRICS & GYNECOLOGY

## 2023-01-12 PROCEDURE — 87389 HIV-1 AG W/HIV-1&-2 AB AG IA: CPT | Performed by: OBSTETRICS & GYNECOLOGY

## 2023-01-13 LAB
C TRACH DNA SPEC QL NAA+PROBE: NOT DETECTED
HIV 1+2 AB+HIV1 P24 AG SERPL QL IA: NORMAL
N GONORRHOEA DNA SPEC QL NAA+PROBE: NOT DETECTED

## 2023-01-14 LAB
BACTERIA SPEC AEROBE CULT: NORMAL
RPR SER QL: NORMAL

## 2023-01-19 ENCOUNTER — ROUTINE PRENATAL (OUTPATIENT)
Dept: OBSTETRICS AND GYNECOLOGY | Facility: CLINIC | Age: 33
End: 2023-01-19
Payer: MEDICAID

## 2023-01-19 VITALS
SYSTOLIC BLOOD PRESSURE: 138 MMHG | DIASTOLIC BLOOD PRESSURE: 70 MMHG | WEIGHT: 269.63 LBS | BODY MASS INDEX: 42.23 KG/M2

## 2023-01-19 DIAGNOSIS — Z98.891 HISTORY OF CESAREAN SECTION: ICD-10-CM

## 2023-01-19 DIAGNOSIS — O99.013 ANEMIA DURING PREGNANCY IN THIRD TRIMESTER: ICD-10-CM

## 2023-01-19 DIAGNOSIS — O09.90 SUPERVISION OF HIGH RISK PREGNANCY, ANTEPARTUM: Primary | ICD-10-CM

## 2023-01-19 DIAGNOSIS — O09.522 ADVANCED MATERNAL AGE IN MULTIGRAVIDA, SECOND TRIMESTER: ICD-10-CM

## 2023-01-19 PROCEDURE — 99999 PR PBB SHADOW E&M-EST. PATIENT-LVL III: ICD-10-PCS | Mod: PBBFAC,,, | Performed by: OBSTETRICS & GYNECOLOGY

## 2023-01-19 PROCEDURE — 99213 OFFICE O/P EST LOW 20 MIN: CPT | Mod: PBBFAC,TH | Performed by: OBSTETRICS & GYNECOLOGY

## 2023-01-19 PROCEDURE — 99213 PR OFFICE/OUTPT VISIT, EST, LEVL III, 20-29 MIN: ICD-10-PCS | Mod: TH,S$PBB,, | Performed by: OBSTETRICS & GYNECOLOGY

## 2023-01-19 PROCEDURE — 99999 PR PBB SHADOW E&M-EST. PATIENT-LVL III: CPT | Mod: PBBFAC,,, | Performed by: OBSTETRICS & GYNECOLOGY

## 2023-01-19 PROCEDURE — 99213 OFFICE O/P EST LOW 20 MIN: CPT | Mod: TH,S$PBB,, | Performed by: OBSTETRICS & GYNECOLOGY

## 2023-01-19 NOTE — PROGRESS NOTES
Complaints today:Ms. Allen reports that she has some pelvic pain when she makes certain movements.   Normal fetal movements with no vaginal bleeding, LOF or contractions at this time.     /70   Wt 122.3 kg (269 lb 10 oz)   LMP 2022 (Exact Date)   BMI 42.23 kg/m²     33 y.o., at 37w2d by Estimated Date of Delivery: 23  Patient Active Problem List   Diagnosis    Mild intermittent asthma without complication    HSV (herpes simplex virus) anogenital infection    Sickle cell trait; FOB needs screening    Marijuana abuse in remission    S/P  section    Advanced maternal age in multigravida, second trimester    Supervision of high risk pregnancy, antepartum    History of  section     OB History    Para Term  AB Living   5 2 2   2 2   SAB IAB Ectopic Multiple Live Births     2     2      # Outcome Date GA Lbr Zack/2nd Weight Sex Delivery Anes PTL Lv   5 Current            4 Term 10/22/21 41w1d  3.459 kg (7 lb 10 oz) M CS-LTranv Spinal N CLARIBEL      Complications: Gestational hypertension without significant proteinuria during pregnancy in third trimester, antepartum   3 Term 06 39w0d  3.487 kg (7 lb 11 oz) M CS-LTranv  N CLARIBEL      Birth Comments: failed IOL, preE      Complications: Preeclampsia, third trimester   2 IAB            1 IAB               Obstetric Comments   Gynhx: reg   H/o genital herpes, chlamydia       Dating reviewed    Allergies and problem list reviewed and updated    Medical and surgical history reviewed    Prenatal labs reviewed and updated    Physical Exam:  ABD: soft, gravid, nontender, 38cm    Assessment:  Ole Saavedra was seen today for routine prenatal visit.    Diagnoses and all orders for this visit:    Supervision of high risk pregnancy, antepartum    History of  section    Advanced maternal age in multigravida, second trimester    Anemia during pregnancy in third trimester        No orders of the defined types were placed in  this encounter.      Follow up in about 1 week (around 1/26/2023) for Routine OB.

## 2023-01-26 ENCOUNTER — ROUTINE PRENATAL (OUTPATIENT)
Dept: OBSTETRICS AND GYNECOLOGY | Facility: CLINIC | Age: 33
End: 2023-01-26
Payer: MEDICAID

## 2023-01-26 VITALS — WEIGHT: 268.31 LBS | BODY MASS INDEX: 42.02 KG/M2

## 2023-01-26 DIAGNOSIS — Z98.891 HISTORY OF 2 CESAREAN SECTIONS: ICD-10-CM

## 2023-01-26 DIAGNOSIS — A60.9 HSV (HERPES SIMPLEX VIRUS) ANOGENITAL INFECTION: ICD-10-CM

## 2023-01-26 DIAGNOSIS — O09.90 SUPERVISION OF HIGH RISK PREGNANCY, ANTEPARTUM: Primary | ICD-10-CM

## 2023-01-26 DIAGNOSIS — B37.9 YEAST INFECTION: ICD-10-CM

## 2023-01-26 DIAGNOSIS — Z98.891 HISTORY OF CESAREAN SECTION: ICD-10-CM

## 2023-01-26 PROCEDURE — 99999 PR PBB SHADOW E&M-EST. PATIENT-LVL III: ICD-10-PCS | Mod: PBBFAC,,, | Performed by: OBSTETRICS & GYNECOLOGY

## 2023-01-26 PROCEDURE — 99213 PR OFFICE/OUTPT VISIT, EST, LEVL III, 20-29 MIN: ICD-10-PCS | Mod: TH,S$PBB,, | Performed by: OBSTETRICS & GYNECOLOGY

## 2023-01-26 PROCEDURE — 99213 OFFICE O/P EST LOW 20 MIN: CPT | Mod: PBBFAC,TH | Performed by: OBSTETRICS & GYNECOLOGY

## 2023-01-26 PROCEDURE — 99213 OFFICE O/P EST LOW 20 MIN: CPT | Mod: TH,S$PBB,, | Performed by: OBSTETRICS & GYNECOLOGY

## 2023-01-26 PROCEDURE — 99999 PR PBB SHADOW E&M-EST. PATIENT-LVL III: CPT | Mod: PBBFAC,,, | Performed by: OBSTETRICS & GYNECOLOGY

## 2023-01-26 RX ORDER — METHYLERGONOVINE MALEATE 0.2 MG/ML
200 INJECTION INTRAVENOUS
Status: DISCONTINUED | OUTPATIENT
Start: 2023-01-26 | End: 2023-02-06

## 2023-01-26 RX ORDER — FAMOTIDINE 10 MG/ML
20 INJECTION INTRAVENOUS
Status: DISCONTINUED | OUTPATIENT
Start: 2023-01-26 | End: 2023-02-06

## 2023-01-26 RX ORDER — SODIUM CHLORIDE, SODIUM LACTATE, POTASSIUM CHLORIDE, CALCIUM CHLORIDE 600; 310; 30; 20 MG/100ML; MG/100ML; MG/100ML; MG/100ML
INJECTION, SOLUTION INTRAVENOUS CONTINUOUS
Status: DISCONTINUED | OUTPATIENT
Start: 2023-01-26 | End: 2023-02-06

## 2023-01-26 RX ORDER — MISOPROSTOL 100 UG/1
800 TABLET ORAL
Status: DISCONTINUED | OUTPATIENT
Start: 2023-01-26 | End: 2023-02-06

## 2023-01-26 RX ORDER — MUPIROCIN 20 MG/G
OINTMENT TOPICAL
Status: CANCELLED | OUTPATIENT
Start: 2023-01-26

## 2023-01-26 RX ORDER — CARBOPROST TROMETHAMINE 250 UG/ML
250 INJECTION, SOLUTION INTRAMUSCULAR
Status: DISCONTINUED | OUTPATIENT
Start: 2023-01-26 | End: 2023-02-06

## 2023-01-26 RX ORDER — OXYTOCIN/RINGER'S LACTATE 30/500 ML
334 PLASTIC BAG, INJECTION (ML) INTRAVENOUS ONCE
Status: DISCONTINUED | OUTPATIENT
Start: 2023-01-26 | End: 2023-02-06

## 2023-01-26 RX ORDER — TERCONAZOLE 8 MG/G
1 CREAM VAGINAL NIGHTLY
Qty: 20 G | Refills: 0 | Status: SHIPPED | OUTPATIENT
Start: 2023-01-26 | End: 2023-01-29

## 2023-01-26 RX ORDER — OXYTOCIN/RINGER'S LACTATE 30/500 ML
95 PLASTIC BAG, INJECTION (ML) INTRAVENOUS ONCE
Status: DISCONTINUED | OUTPATIENT
Start: 2023-01-26 | End: 2023-02-06

## 2023-01-26 RX ORDER — SODIUM CITRATE AND CITRIC ACID MONOHYDRATE 334; 500 MG/5ML; MG/5ML
30 SOLUTION ORAL
Status: DISCONTINUED | OUTPATIENT
Start: 2023-01-26 | End: 2023-02-06

## 2023-01-26 NOTE — PROGRESS NOTES
Complaints today:Ms. Allen reports that she is doing well. She has reports vaginal irritation. Normal fetal movements with no vaginal bleeding, LOF or contractions at this time.     Wt 121.7 kg (268 lb 4.8 oz)   LMP 2022 (Exact Date)   BMI 42.02 kg/m²     33 y.o., at 38w2d by Estimated Date of Delivery: 23  Patient Active Problem List   Diagnosis    Mild intermittent asthma without complication    HSV (herpes simplex virus) anogenital infection    Sickle cell trait; FOB needs screening    Marijuana abuse in remission    S/P  section    Advanced maternal age in multigravida, second trimester    Supervision of high risk pregnancy, antepartum    History of  section     OB History    Para Term  AB Living   5 2 2   2 2   SAB IAB Ectopic Multiple Live Births     2     2      # Outcome Date GA Lbr Zack/2nd Weight Sex Delivery Anes PTL Lv   5 Current            4 Term 10/22/21 41w1d  3.459 kg (7 lb 10 oz) M CS-LTranv Spinal N CLARIBEL      Complications: Gestational hypertension without significant proteinuria during pregnancy in third trimester, antepartum   3 Term 06 39w0d  3.487 kg (7 lb 11 oz) M CS-LTranv  N CLARIBEL      Birth Comments: failed IOL, preE      Complications: Preeclampsia, third trimester   2 IAB            1 IAB               Obstetric Comments   Gynhx: reg   H/o genital herpes, chlamydia       Dating reviewed    Allergies and problem list reviewed and updated    Medical and surgical history reviewed    Prenatal labs reviewed and updated    Physical Exam:  ABD: soft, gravid, nontender, 39cm    Assessment:  Ole Saavedra was seen today for routine prenatal visit.    Diagnoses and all orders for this visit:    Supervision of high risk pregnancy, antepartum  -     miSOPROStoL tablet 800 mcg    History of  section    HSV (herpes simplex virus) anogenital infection    History of 2  sections    Yeast infection  -     terconazole (TERAZOL 3) 0.8 %  vaginal cream; Place 1 applicator vaginally every evening. for 3 days    Other orders  -     Admit to Inpatient; Standing  -     Vital signs; Standing  -     Progressive Mobility Protocol (mobilize patient to their highest level of functioning at least twice daily); Standing  -     Bed rest With bathroom privileges; Standing  -     Verify informed consent; Standing  -     Fetal monitoring WITH contraction monitoring; Standing  -     Dugan to Gravity; Standing  -     Insert peripheral IV; Standing  -     Chlorhexidine (CHG) 2% Wipes; Standing  -     Clip and Prep Suprapubic; Standing  -     Notify NICU to attend birth; Standing  -     Notify Physician; Standing  -     Diet NPO; Standing  -     lactated ringers bolus 1,000 mL  -     lactated ringers infusion  -     IP VTE LOW RISK PATIENT; Standing  -     sodium citrate-citric acid 500-334 mg/5 ml solution 30 mL  -     famotidine (PF) injection 20 mg  -     methylergonovine injection 200 mcg  -     carboprost injection 250 mcg  -     Chlorohexidine Gluconate Bath; Standing  -     mupirocin 2 % ointment  -     oxytocin 30 units in 500 mL lactated ringers infusion (non-titrating)  -     oxytocin 30 units in 500 mL lactated ringers infusion (non-titrating)  -     CBC auto differential; Standing  -     POCT Cord Blood Gas Umbilical Artery Cord Gas; Standing  -     POCT Cord Blood Gas Umbilical Vein Cord Gas; Standing  -     Type & Screen, Labor & Delivery; Standing  -     Inpatient consult to Anesthesiology; Standing  -     Full code; Standing  -     Place JEANNE hose; Standing  -     Place sequential compression device; Standing  -     ceFAZolin (ANCEF) 3 g in dextrose 5 % (D5W) 50 mL IVPB  -     Admit to Inpatient  -     Vital signs  -     Progressive Mobility Protocol (mobilize patient to their highest level of functioning at least twice daily)  -     Progressive Mobility Protocol (mobilize patient to their highest level of functioning at least twice daily)  -      Progressive Mobility Protocol (mobilize patient to their highest level of functioning at least twice daily)  -     Bed rest With bathroom privileges  -     Verify informed consent  -     Fetal monitoring WITH contraction monitoring  -     Dugan to Gravity  -     Insert peripheral IV  -     Chlorhexidine (CHG) 2% Wipes  -     Clip and Prep Suprapubic  -     Notify NICU to attend birth  -     Notify Physician  -     Diet NPO  -     IP VTE LOW RISK PATIENT  -     CBC auto differential  -     Type & Screen, Labor & Delivery  -     Inpatient consult to Anesthesiology  -     Full code  -     Place JEANNE hose  -     Place sequential compression device        Orders Placed This Encounter   Procedures    CBC auto differential    Diet NPO    Vital signs    Verify informed consent    Fetal monitoring WITH contraction monitoring    Dugan to Gravity    Chlorhexidine (CHG) 2% Wipes    Clip and Prep Suprapubic    Notify NICU to attend birth    Notify Physician    Place JEANNE hose    Place sequential compression device    Full code    Inpatient consult to Anesthesiology    POCT Cord Blood Gas Umbilical Artery Cord Gas    POCT Cord Blood Gas Umbilical Vein Cord Gas    Type & Screen, Labor & Delivery    Insert peripheral IV    Admit to Inpatient       Follow up in about 1 week (around 2/2/2023) for Routine OB.

## 2023-02-02 ENCOUNTER — ROUTINE PRENATAL (OUTPATIENT)
Dept: OBSTETRICS AND GYNECOLOGY | Facility: CLINIC | Age: 33
End: 2023-02-02
Payer: MEDICAID

## 2023-02-02 VITALS
SYSTOLIC BLOOD PRESSURE: 148 MMHG | BODY MASS INDEX: 42.38 KG/M2 | DIASTOLIC BLOOD PRESSURE: 78 MMHG | WEIGHT: 270.63 LBS

## 2023-02-02 DIAGNOSIS — O99.013 ANEMIA DURING PREGNANCY IN THIRD TRIMESTER: ICD-10-CM

## 2023-02-02 DIAGNOSIS — Z98.891 HISTORY OF CESAREAN SECTION: ICD-10-CM

## 2023-02-02 DIAGNOSIS — O09.90 SUPERVISION OF HIGH RISK PREGNANCY, ANTEPARTUM: Primary | ICD-10-CM

## 2023-02-02 PROCEDURE — 99999 PR PBB SHADOW E&M-EST. PATIENT-LVL III: ICD-10-PCS | Mod: PBBFAC,,, | Performed by: OBSTETRICS & GYNECOLOGY

## 2023-02-02 PROCEDURE — 99213 OFFICE O/P EST LOW 20 MIN: CPT | Mod: TH,S$PBB,, | Performed by: OBSTETRICS & GYNECOLOGY

## 2023-02-02 PROCEDURE — 99999 PR PBB SHADOW E&M-EST. PATIENT-LVL III: CPT | Mod: PBBFAC,,, | Performed by: OBSTETRICS & GYNECOLOGY

## 2023-02-02 PROCEDURE — 99213 PR OFFICE/OUTPT VISIT, EST, LEVL III, 20-29 MIN: ICD-10-PCS | Mod: TH,S$PBB,, | Performed by: OBSTETRICS & GYNECOLOGY

## 2023-02-02 PROCEDURE — 99213 OFFICE O/P EST LOW 20 MIN: CPT | Mod: PBBFAC,TH | Performed by: OBSTETRICS & GYNECOLOGY

## 2023-02-02 NOTE — PROGRESS NOTES
Complaints today: Ms. Allen is doing well with no complaints. Normal fetal movements with no vaginal bleeding, LOF or contractions at this time.     BP (!) 148/78   Wt 122.7 kg (270 lb 9.8 oz)   LMP 2022 (Exact Date)   BMI 42.38 kg/m²     33 y.o., at 39w2d by Estimated Date of Delivery: 23  Patient Active Problem List   Diagnosis    Mild intermittent asthma without complication    HSV (herpes simplex virus) anogenital infection    Sickle cell trait; FOB needs screening    Marijuana abuse in remission    S/P  section    Advanced maternal age in multigravida, second trimester    Supervision of high risk pregnancy, antepartum    History of  section     OB History    Para Term  AB Living   5 2 2   2 2   SAB IAB Ectopic Multiple Live Births     2     2      # Outcome Date GA Lbr Zack/2nd Weight Sex Delivery Anes PTL Lv   5 Current            4 Term 10/22/21 41w1d  3.459 kg (7 lb 10 oz) M CS-LTranv Spinal N CLARIBEL      Complications: Gestational hypertension without significant proteinuria during pregnancy in third trimester, antepartum   3 Term 06 39w0d  3.487 kg (7 lb 11 oz) M CS-LTranv  N CLARIBEL      Birth Comments: failed IOL, preE      Complications: Preeclampsia, third trimester   2 IAB            1 IAB               Obstetric Comments   Gynhx: reg   H/o genital herpes, chlamydia       Dating reviewed    Allergies and problem list reviewed and updated    Medical and surgical history reviewed    Prenatal labs reviewed and updated    Physical Exam:  ABD: soft, gravid, nontender, 40c    Assessment:  Ole Saavedra was seen today for routine prenatal visit.    Diagnoses and all orders for this visit:    Supervision of high risk pregnancy, antepartum    History of  section  - Reviewed preop  With patient     Anemia during pregnancy in third trimester        No orders of the defined types were placed in this encounter.      Follow up in about 1 day (around  2/3/2023) for for repeat C/S at 9am.

## 2023-02-03 ENCOUNTER — ANESTHESIA EVENT (OUTPATIENT)
Dept: OBSTETRICS AND GYNECOLOGY | Facility: HOSPITAL | Age: 33
End: 2023-02-03
Payer: MEDICAID

## 2023-02-03 ENCOUNTER — ANESTHESIA (OUTPATIENT)
Dept: OBSTETRICS AND GYNECOLOGY | Facility: HOSPITAL | Age: 33
End: 2023-02-03
Payer: MEDICAID

## 2023-02-03 ENCOUNTER — HOSPITAL ENCOUNTER (INPATIENT)
Facility: HOSPITAL | Age: 33
LOS: 3 days | Discharge: HOME | End: 2023-02-06
Attending: OBSTETRICS & GYNECOLOGY | Admitting: OBSTETRICS & GYNECOLOGY
Payer: MEDICAID

## 2023-02-03 DIAGNOSIS — Z98.891 HISTORY OF CESAREAN SECTION: Primary | ICD-10-CM

## 2023-02-03 DIAGNOSIS — Z98.891 STATUS POST CESAREAN SECTION: ICD-10-CM

## 2023-02-03 DIAGNOSIS — O13.9 GESTATIONAL HYPERTENSION, ANTEPARTUM: ICD-10-CM

## 2023-02-03 DIAGNOSIS — Z98.891 HISTORY OF 2 CESAREAN SECTIONS: ICD-10-CM

## 2023-02-03 LAB
ABO + RH BLD: NORMAL
BASOPHILS # BLD AUTO: 0.03 K/UL (ref 0–0.2)
BASOPHILS NFR BLD: 0.5 % (ref 0–1.9)
BLD GP AB SCN CELLS X3 SERPL QL: NORMAL
DIFFERENTIAL METHOD: ABNORMAL
EOSINOPHIL # BLD AUTO: 0.2 K/UL (ref 0–0.5)
EOSINOPHIL NFR BLD: 3.2 % (ref 0–8)
ERYTHROCYTE [DISTWIDTH] IN BLOOD BY AUTOMATED COUNT: 16.1 % (ref 11.5–14.5)
HCT VFR BLD AUTO: 29 % (ref 37–48.5)
HGB BLD-MCNC: 9.6 G/DL (ref 12–16)
IMM GRANULOCYTES # BLD AUTO: 0.04 K/UL (ref 0–0.04)
IMM GRANULOCYTES NFR BLD AUTO: 0.6 % (ref 0–0.5)
LYMPHOCYTES # BLD AUTO: 1.7 K/UL (ref 1–4.8)
LYMPHOCYTES NFR BLD: 27 % (ref 18–48)
MCH RBC QN AUTO: 26.8 PG (ref 27–31)
MCHC RBC AUTO-ENTMCNC: 33.1 G/DL (ref 32–36)
MCV RBC AUTO: 81 FL (ref 82–98)
MONOCYTES # BLD AUTO: 0.4 K/UL (ref 0.3–1)
MONOCYTES NFR BLD: 5.9 % (ref 4–15)
NEUTROPHILS # BLD AUTO: 3.9 K/UL (ref 1.8–7.7)
NEUTROPHILS NFR BLD: 62.8 % (ref 38–73)
NRBC BLD-RTO: 0 /100 WBC
PLATELET # BLD AUTO: 229 K/UL (ref 150–450)
PMV BLD AUTO: 10.5 FL (ref 9.2–12.9)
RBC # BLD AUTO: 3.58 M/UL (ref 4–5.4)
WBC # BLD AUTO: 6.26 K/UL (ref 3.9–12.7)

## 2023-02-03 PROCEDURE — 11000001 HC ACUTE MED/SURG PRIVATE ROOM

## 2023-02-03 PROCEDURE — 25000003 PHARM REV CODE 250: Performed by: OBSTETRICS & GYNECOLOGY

## 2023-02-03 PROCEDURE — 63600175 PHARM REV CODE 636 W HCPCS: Performed by: OBSTETRICS & GYNECOLOGY

## 2023-02-03 PROCEDURE — 87389 HIV-1 AG W/HIV-1&-2 AB AG IA: CPT | Performed by: OBSTETRICS & GYNECOLOGY

## 2023-02-03 PROCEDURE — 59514 PR CESAREAN DELIVERY ONLY: ICD-10-PCS | Mod: GB,,, | Performed by: OBSTETRICS & GYNECOLOGY

## 2023-02-03 PROCEDURE — 37000008 HC ANESTHESIA 1ST 15 MINUTES: Performed by: OBSTETRICS & GYNECOLOGY

## 2023-02-03 PROCEDURE — 59514 CESAREAN DELIVERY ONLY: CPT | Mod: QX,CRNA,, | Performed by: NURSE ANESTHETIST, CERTIFIED REGISTERED

## 2023-02-03 PROCEDURE — 25000003 PHARM REV CODE 250: Performed by: ANESTHESIOLOGY

## 2023-02-03 PROCEDURE — 59514 PRA REAN DELIVERY ONLY: ICD-10-PCS | Mod: QY,ANES,, | Performed by: ANESTHESIOLOGY

## 2023-02-03 PROCEDURE — 36004725 HC OB OR TIME LEV III - EA ADD 15 MIN: Performed by: OBSTETRICS & GYNECOLOGY

## 2023-02-03 PROCEDURE — 37000009 HC ANESTHESIA EA ADD 15 MINS: Performed by: OBSTETRICS & GYNECOLOGY

## 2023-02-03 PROCEDURE — 63600175 PHARM REV CODE 636 W HCPCS: Performed by: ANESTHESIOLOGY

## 2023-02-03 PROCEDURE — 99222 PR INITIAL HOSPITAL CARE,LEVL II: ICD-10-PCS | Mod: ,,, | Performed by: OBSTETRICS & GYNECOLOGY

## 2023-02-03 PROCEDURE — 36004724 HC OB OR TIME LEV III - 1ST 15 MIN: Performed by: OBSTETRICS & GYNECOLOGY

## 2023-02-03 PROCEDURE — 59514 PRA REAN DELIVERY ONLY: ICD-10-PCS | Mod: QX,CRNA,, | Performed by: NURSE ANESTHETIST, CERTIFIED REGISTERED

## 2023-02-03 PROCEDURE — 86900 BLOOD TYPING SEROLOGIC ABO: CPT | Performed by: OBSTETRICS & GYNECOLOGY

## 2023-02-03 PROCEDURE — 99222 1ST HOSP IP/OBS MODERATE 55: CPT | Mod: ,,, | Performed by: OBSTETRICS & GYNECOLOGY

## 2023-02-03 PROCEDURE — 59514 CESAREAN DELIVERY ONLY: CPT | Mod: QY,ANES,, | Performed by: ANESTHESIOLOGY

## 2023-02-03 PROCEDURE — 63600175 PHARM REV CODE 636 W HCPCS: Performed by: NURSE ANESTHETIST, CERTIFIED REGISTERED

## 2023-02-03 PROCEDURE — 71000033 HC RECOVERY, INTIAL HOUR: Performed by: OBSTETRICS & GYNECOLOGY

## 2023-02-03 PROCEDURE — 85025 COMPLETE CBC W/AUTO DIFF WBC: CPT | Performed by: OBSTETRICS & GYNECOLOGY

## 2023-02-03 PROCEDURE — 59514 CESAREAN DELIVERY ONLY: CPT | Mod: GB,,, | Performed by: OBSTETRICS & GYNECOLOGY

## 2023-02-03 PROCEDURE — 59514 CESAREAN DELIVERY ONLY: CPT | Mod: AS,GB,, | Performed by: PHYSICIAN ASSISTANT

## 2023-02-03 PROCEDURE — 71000039 HC RECOVERY, EACH ADD'L HOUR: Performed by: OBSTETRICS & GYNECOLOGY

## 2023-02-03 PROCEDURE — 36415 COLL VENOUS BLD VENIPUNCTURE: CPT | Performed by: OBSTETRICS & GYNECOLOGY

## 2023-02-03 PROCEDURE — 59025 FETAL NON-STRESS TEST: CPT

## 2023-02-03 PROCEDURE — 51702 INSERT TEMP BLADDER CATH: CPT

## 2023-02-03 PROCEDURE — 59514 PR CESAREAN DELIVERY ONLY: ICD-10-PCS | Mod: AS,GB,, | Performed by: PHYSICIAN ASSISTANT

## 2023-02-03 PROCEDURE — 86592 SYPHILIS TEST NON-TREP QUAL: CPT | Performed by: OBSTETRICS & GYNECOLOGY

## 2023-02-03 RX ORDER — KETOROLAC TROMETHAMINE 30 MG/ML
30 INJECTION, SOLUTION INTRAMUSCULAR; INTRAVENOUS EVERY 6 HOURS
Status: COMPLETED | OUTPATIENT
Start: 2023-02-03 | End: 2023-02-04

## 2023-02-03 RX ORDER — FENTANYL CITRATE 50 UG/ML
INJECTION, SOLUTION INTRAMUSCULAR; INTRAVENOUS
Status: DISCONTINUED | OUTPATIENT
Start: 2023-02-03 | End: 2023-02-03

## 2023-02-03 RX ORDER — MUPIROCIN 20 MG/G
OINTMENT TOPICAL
Status: DISCONTINUED | OUTPATIENT
Start: 2023-02-03 | End: 2023-02-03

## 2023-02-03 RX ORDER — OXYCODONE HYDROCHLORIDE 5 MG/1
10 TABLET ORAL EVERY 4 HOURS PRN
Status: ACTIVE | OUTPATIENT
Start: 2023-02-03 | End: 2023-02-04

## 2023-02-03 RX ORDER — ONDANSETRON HYDROCHLORIDE 2 MG/ML
INJECTION, SOLUTION INTRAMUSCULAR; INTRAVENOUS
Status: DISCONTINUED | OUTPATIENT
Start: 2023-02-03 | End: 2023-02-03

## 2023-02-03 RX ORDER — BUPIVACAINE HYDROCHLORIDE 7.5 MG/ML
INJECTION, SOLUTION EPIDURAL; RETROBULBAR
Status: COMPLETED | OUTPATIENT
Start: 2023-02-03 | End: 2023-02-03

## 2023-02-03 RX ORDER — DIPHENHYDRAMINE HCL 25 MG
25 CAPSULE ORAL EVERY 6 HOURS PRN
Status: DISCONTINUED | OUTPATIENT
Start: 2023-02-03 | End: 2023-02-06 | Stop reason: HOSPADM

## 2023-02-03 RX ORDER — ACETAMINOPHEN 325 MG/1
650 TABLET ORAL EVERY 6 HOURS
Status: DISCONTINUED | OUTPATIENT
Start: 2023-02-03 | End: 2023-02-03

## 2023-02-03 RX ORDER — DIPHENHYDRAMINE HYDROCHLORIDE 50 MG/ML
25 INJECTION INTRAMUSCULAR; INTRAVENOUS EVERY 6 HOURS PRN
Status: DISCONTINUED | OUTPATIENT
Start: 2023-02-03 | End: 2023-02-06 | Stop reason: HOSPADM

## 2023-02-03 RX ORDER — NALOXONE HCL 0.4 MG/ML
0.04 VIAL (ML) INJECTION EVERY 5 MIN PRN
Status: DISCONTINUED | OUTPATIENT
Start: 2023-02-03 | End: 2023-02-06 | Stop reason: HOSPADM

## 2023-02-03 RX ORDER — OXYCODONE HYDROCHLORIDE 5 MG/1
5 TABLET ORAL EVERY 4 HOURS PRN
Status: DISPENSED | OUTPATIENT
Start: 2023-02-03 | End: 2023-02-04

## 2023-02-03 RX ORDER — MUPIROCIN 20 MG/G
OINTMENT TOPICAL 2 TIMES DAILY
Status: DISCONTINUED | OUTPATIENT
Start: 2023-02-03 | End: 2023-02-06 | Stop reason: HOSPADM

## 2023-02-03 RX ORDER — SODIUM CITRATE AND CITRIC ACID MONOHYDRATE 334; 500 MG/5ML; MG/5ML
30 SOLUTION ORAL ONCE
Status: DISCONTINUED | OUTPATIENT
Start: 2023-02-03 | End: 2023-02-03

## 2023-02-03 RX ORDER — FAMOTIDINE 10 MG/ML
20 INJECTION INTRAVENOUS 2 TIMES DAILY
Status: DISCONTINUED | OUTPATIENT
Start: 2023-02-03 | End: 2023-02-03

## 2023-02-03 RX ORDER — SODIUM CHLORIDE, SODIUM LACTATE, POTASSIUM CHLORIDE, CALCIUM CHLORIDE 600; 310; 30; 20 MG/100ML; MG/100ML; MG/100ML; MG/100ML
INJECTION, SOLUTION INTRAVENOUS CONTINUOUS
Status: DISCONTINUED | OUTPATIENT
Start: 2023-02-03 | End: 2023-02-03

## 2023-02-03 RX ORDER — SODIUM CITRATE AND CITRIC ACID MONOHYDRATE 334; 500 MG/5ML; MG/5ML
30 SOLUTION ORAL ONCE
Status: COMPLETED | OUTPATIENT
Start: 2023-02-03 | End: 2023-02-03

## 2023-02-03 RX ORDER — PHENYLEPHRINE HYDROCHLORIDE 10 MG/ML
INJECTION INTRAVENOUS
Status: DISCONTINUED | OUTPATIENT
Start: 2023-02-03 | End: 2023-02-03

## 2023-02-03 RX ORDER — MORPHINE SULFATE 0.5 MG/ML
INJECTION, SOLUTION EPIDURAL; INTRATHECAL; INTRAVENOUS
Status: DISCONTINUED | OUTPATIENT
Start: 2023-02-03 | End: 2023-02-03

## 2023-02-03 RX ORDER — KETOROLAC TROMETHAMINE 30 MG/ML
30 INJECTION, SOLUTION INTRAMUSCULAR; INTRAVENOUS EVERY 6 HOURS
Status: DISCONTINUED | OUTPATIENT
Start: 2023-02-03 | End: 2023-02-03

## 2023-02-03 RX ORDER — ACETAMINOPHEN 325 MG/1
650 TABLET ORAL EVERY 6 HOURS
Status: COMPLETED | OUTPATIENT
Start: 2023-02-03 | End: 2023-02-04

## 2023-02-03 RX ORDER — ONDANSETRON 2 MG/ML
4 INJECTION INTRAMUSCULAR; INTRAVENOUS EVERY 6 HOURS PRN
Status: ACTIVE | OUTPATIENT
Start: 2023-02-03 | End: 2023-02-04

## 2023-02-03 RX ORDER — OXYTOCIN 10 [USP'U]/ML
INJECTION, SOLUTION INTRAMUSCULAR; INTRAVENOUS
Status: DISCONTINUED | OUTPATIENT
Start: 2023-02-03 | End: 2023-02-03

## 2023-02-03 RX ORDER — OXYTOCIN/RINGER'S LACTATE 30/500 ML
PLASTIC BAG, INJECTION (ML) INTRAVENOUS CONTINUOUS PRN
Status: DISCONTINUED | OUTPATIENT
Start: 2023-02-03 | End: 2023-02-03

## 2023-02-03 RX ORDER — PROCHLORPERAZINE EDISYLATE 5 MG/ML
5 INJECTION INTRAMUSCULAR; INTRAVENOUS EVERY 6 HOURS PRN
Status: DISCONTINUED | OUTPATIENT
Start: 2023-02-03 | End: 2023-02-06 | Stop reason: HOSPADM

## 2023-02-03 RX ADMIN — Medication 334 ML/HR: at 12:02

## 2023-02-03 RX ADMIN — OXYTOCIN 40 UNITS: 10 INJECTION, SOLUTION INTRAMUSCULAR; INTRAVENOUS at 12:02

## 2023-02-03 RX ADMIN — CEFAZOLIN SODIUM 3 G: 2 SOLUTION INTRAVENOUS at 11:02

## 2023-02-03 RX ADMIN — ACETAMINOPHEN 650 MG: 325 TABLET ORAL at 04:02

## 2023-02-03 RX ADMIN — SODIUM CHLORIDE, SODIUM LACTATE, POTASSIUM CHLORIDE, AND CALCIUM CHLORIDE: .6; .31; .03; .02 INJECTION, SOLUTION INTRAVENOUS at 12:02

## 2023-02-03 RX ADMIN — MORPHINE SULFATE 0.1 MG: 0.5 INJECTION, SOLUTION EPIDURAL; INTRATHECAL; INTRAVENOUS at 11:02

## 2023-02-03 RX ADMIN — SODIUM CHLORIDE, POTASSIUM CHLORIDE, SODIUM LACTATE AND CALCIUM CHLORIDE 1000 ML: 600; 310; 30; 20 INJECTION, SOLUTION INTRAVENOUS at 10:02

## 2023-02-03 RX ADMIN — PHENYLEPHRINE HYDROCHLORIDE 200 MCG: 10 INJECTION INTRAVENOUS at 11:02

## 2023-02-03 RX ADMIN — ACETAMINOPHEN 650 MG: 325 TABLET ORAL at 11:02

## 2023-02-03 RX ADMIN — PHENYLEPHRINE HYDROCHLORIDE 100 MCG: 10 INJECTION INTRAVENOUS at 11:02

## 2023-02-03 RX ADMIN — DIPHENHYDRAMINE HYDROCHLORIDE 25 MG: 25 CAPSULE ORAL at 06:02

## 2023-02-03 RX ADMIN — SODIUM CITRATE AND CITRIC ACID MONOHYDRATE 30 ML: 500; 334 SOLUTION ORAL at 10:02

## 2023-02-03 RX ADMIN — FENTANYL CITRATE 90 MCG: 50 INJECTION, SOLUTION INTRAMUSCULAR; INTRAVENOUS at 12:02

## 2023-02-03 RX ADMIN — KETOROLAC TROMETHAMINE 30 MG: 30 INJECTION, SOLUTION INTRAMUSCULAR; INTRAVENOUS at 04:02

## 2023-02-03 RX ADMIN — FAMOTIDINE 20 MG: 10 INJECTION INTRAVENOUS at 11:02

## 2023-02-03 RX ADMIN — BUPIVACAINE HYDROCHLORIDE 1.6 ML: 7.5 INJECTION, SOLUTION EPIDURAL; RETROBULBAR at 11:02

## 2023-02-03 RX ADMIN — KETOROLAC TROMETHAMINE 30 MG: 30 INJECTION, SOLUTION INTRAMUSCULAR; INTRAVENOUS at 11:02

## 2023-02-03 RX ADMIN — MUPIROCIN: 20 OINTMENT TOPICAL at 09:02

## 2023-02-03 RX ADMIN — ONDANSETRON 4 MG: 2 INJECTION INTRAMUSCULAR; INTRAVENOUS at 12:02

## 2023-02-03 RX ADMIN — FENTANYL CITRATE 10 MCG: 0.05 INJECTION, SOLUTION INTRAMUSCULAR; INTRAVENOUS at 11:02

## 2023-02-03 RX ADMIN — NALXONE HYDROCHLORIDE 0.04 MG: 0.4 INJECTION INTRAMUSCULAR; INTRAVENOUS; SUBCUTANEOUS at 02:02

## 2023-02-03 RX ADMIN — DIPHENHYDRAMINE HYDROCHLORIDE 25 MG: 25 CAPSULE ORAL at 11:02

## 2023-02-03 NOTE — ASSESSMENT & PLAN NOTE
- Admit to Labor & Delivery  - Ancef on call to OR  - Place IV and start IVFs  - Type & Screen and CBC ordered STAT  - COVID ordered STAT  - Continuous fetal monitoring  - Notify anesthesia of patient arrival  - Postpartum Hemorrhage risk: Medium

## 2023-02-03 NOTE — HPI
Ole Allen is a 33 y.o.  female with IUP at 39w3d weeks gestation who is admitted for scheduled  Section secondary to history of C/S x2.     This IUP is complicated by history of C/S X2, AMA, history of HSV (on valtrex), Rh negative.  Patient denies contractions, denies vaginal bleeding, denies LOF.   Fetal Movement: normal.

## 2023-02-03 NOTE — NURSING
Assumed care of pt from Amber. Pt here for scheduled c/s. Piv infusing to rt wrist lr infusing 300tba in first bag. Efm in place fhts reassuring, no co pain. Pt oriented to room equipment and plan care reviewed

## 2023-02-03 NOTE — TRANSFER OF CARE
Anesthesia Transfer of Care Note    Patient: Ole Allen    Procedure(s) Performed: Procedure(s) (LRB):   SECTION (N/A)    Patient location: Labor and Delivery    Anesthesia Type: spinal    Transport from OR: Transported from OR on room air with adequate spontaneous ventilation    Post pain: adequate analgesia    Post assessment: no apparent anesthetic complications and tolerated procedure well    Post vital signs: stable    Level of consciousness: awake, alert and oriented    Transfer of care protocol was followed      Last vitals:   Visit Vitals  LMP 2022 (Exact Date)   Breastfeeding Yes     
no chest pain/no palpitations/no dyspnea on exertion

## 2023-02-03 NOTE — H&P
[facility] - [place] - Labor & Delivery  Obstetrics  History & Physical    Patient Name: Ole Allen  MRN: [id number]  Admission Date: 2/3/2023  Primary Care Provider: Umesh He MD    Subjective:     Principal Problem:History of  section    History of Present Illness:   Ole Allen is a 33 y.o.  female with IUP at 39w3d weeks gestation who is admitted for scheduled  Section secondary to history of C/S x2.     This IUP is complicated by history of C/S X2, AMA, history of HSV (on valtrex), Rh negative.  Patient denies contractions, denies vaginal bleeding, denies LOF.   Fetal Movement: normal.             OB History    Para Term  AB Living   5 2 2 0 2 2   SAB IAB Ectopic Multiple Live Births   0 2 0 0 2      # Outcome Date GA Lbr Zack/2nd Weight Sex Delivery Anes PTL Lv   5 Current            4 Term 10/22/21 41w1d  3.459 kg (7 lb 10 oz) M CS-LTranv Spinal N CLARIBEL      Complications: Gestational hypertension without significant proteinuria during pregnancy in third trimester, antepartum   3 Term 06 39w0d  3.487 kg (7 lb 11 oz) M CS-LTranv  N CLARIBEL      Birth Comments: failed IOL, preE      Complications: Preeclampsia, third trimester   2 IAB            1 IAB               Obstetric Comments   Gynhx: reg   H/o genital herpes, chlamydia     Past Medical History:   Diagnosis Date    Asthma     Hemoglobin S trait     Herpes      Past Surgical History:   Procedure Laterality Date    ANTERIOR CRUCIATE LIGAMENT REPAIR       SECTION       SECTION N/A 10/22/2021    Procedure:  SECTION;  Surgeon: Julie R. Jeansonne, MD;  Location: [facility] L&D;  Service: OB/GYN;  Laterality: N/A;     SECTION N/A 10/22/2021    Procedure:  SECTION;  Surgeon: Sissy Jean MD;  Location: [facility] L&D;  Service: OB/GYN;  Laterality: N/A;    DILATION AND CURETTAGE OF UTERUS N/A     DILATION AND CURETTAGE OF UTERUS USING SUCTION N/A         Facility-Administered Medications Prior to Admission   Medication    carboprost injection 250 mcg    ceFAZolin (ANCEF) 3 g in dextrose 5 % (D5W) 50 mL IVPB    famotidine (PF) injection 20 mg    lactated ringers bolus 1,000 mL    lactated ringers infusion    methylergonovine injection 200 mcg    miSOPROStoL tablet 800 mcg    oxytocin 30 units in 500 mL lactated ringers infusion (non-titrating)    oxytocin 30 units in 500 mL lactated ringers infusion (non-titrating)    sodium citrate-citric acid 500-334 mg/5 ml solution 30 mL     PTA Medications   Medication Sig    albuterol (PROVENTIL/VENTOLIN HFA) 90 mcg/actuation inhaler Inhale 2 puffs into the lungs every 6 (six) hours as needed for Wheezing. Rescue    albuterol sulfate 2.5 mg/0.5 mL Nebu Take 2.5 mg by nebulization every 4 (four) hours as needed (asthma exacerbation).    aspirin (ECOTRIN) 81 MG EC tablet Take 1 tablet (81 mg total) by mouth once daily.    ferrous sulfate 325 (65 FE) MG EC tablet Take 1 tablet (325 mg total) by mouth once daily.    fluconazole (DIFLUCAN) 150 MG Tab TAKE 1 TABLET (150 MG TOTAL) BY MOUTH ONCE. REFILL AND RE-DOSE IF SYMPTOMS RECUR FOR 1 DOSE    PNV,calcium 72-iron-folic acid (PRENATAL VITAMIN PLUS LOW IRON) 27 mg iron- 1 mg Tab Take 1 tablet (1 each total) by mouth once daily.    valACYclovir (VALTREX) 1000 MG tablet Take 1 tablet (1,000 mg total) by mouth once daily.       Review of patient's allergies indicates:  No Known Allergies     Family History    None       Tobacco Use    Smoking status: Never    Smokeless tobacco: Never   Substance and Sexual Activity    Alcohol use: Not Currently     Comment: occ    Drug use: No    Sexual activity: Yes     Partners: Male     Review of Systems   Constitutional:  Negative for chills and fever.   Eyes:  Negative for visual disturbance.   Respiratory:  Negative for cough and wheezing.    Cardiovascular:  Negative for chest pain and palpitations.   Gastrointestinal:   Negative for abdominal pain, nausea and vomiting.   Genitourinary:  Negative for dysuria, frequency, hematuria, pelvic pain, vaginal bleeding, vaginal discharge and vaginal pain.   Neurological:  Negative for headaches.   Psychiatric/Behavioral:  Negative for depression.     Objective:     Vital Signs (Most Recent):    Vital Signs (24h Range):  BP: (148)/(78) 148/78        There is no height or weight on file to calculate BMI.    FHT: Cat 1 (reassuring)    Physical Exam:   Constitutional: She is oriented to person, place, and time. She appears well-developed and well-nourished.       Cardiovascular:  Normal rate.             Pulmonary/Chest: Effort normal. No respiratory distress.        Abdominal: Soft. She exhibits no distension and no abdominal incision.                 Neurological: She is alert and oriented to person, place, and time.    Skin: Skin is warm and dry.    Psychiatric: She has a normal mood and affect.     Cervix:Deferred     Significant Labs:  Lab Results   Component Value Date    GROUPTRH O NEG 2022    HEPBSAG Non-reactive 2022    STREPBCULT No Group B Streptococcus isolated 2023       CBC:   Recent Labs   Lab 23  1015   WBC 6.26   RBC 3.58*   HGB 9.6*   HCT 29.0*      MCV 81*   MCH 26.8*   MCHC 33.1     I have personallly reviewed all pertinent lab results from the last 24 hours.    Assessment/Plan:     33 y.o. female  at 39w3d for:    * History of  section  - Admit to Labor & Delivery  - Ancef on call to OR  - Place IV and start IVFs  - Type & Screen and CBC ordered STAT  - COVID ordered STAT  - Continuous fetal monitoring  - Notify anesthesia of patient arrival  - Postpartum Hemorrhage risk: Medium          Avila Herman MD  Obstetrics  [facility] - [place] - Labor & Delivery

## 2023-02-03 NOTE — ANESTHESIA PROCEDURE NOTES
Spinal    Diagnosis: IUP for repeat   Patient location during procedure: OR  Start time: 2/3/2023 11:35 AM  Timeout: 2/3/2023 11:34 AM  End time: 2/3/2023 11:42 AM    Staffing  Authorizing Provider: Lee Walker MD  Performing Provider: Lee Walker MD    Preanesthetic Checklist  Completed: patient identified, IV checked, risks and benefits discussed, surgical consent, monitors and equipment checked, pre-op evaluation and timeout performed  Spinal Block  Patient position: sitting  Prep: ChloraPrep  Patient monitoring: heart rate, continuous pulse ox and frequent blood pressure checks  Approach: midline  Location: L3-4  Injection technique: single shot  CSF Fluid: clear free-flowing CSF  Needle  Needle type: pencil-tip   Needle gauge: 25 G  Needle length: 3.5 in  Additional Documentation: incremental injection, negative aspiration for heme and no paresthesia on injection  Needle localization: anatomical landmarks  Assessment  Sensory level: T6   Dermatomal levels determined by alcohol wipe  Ease of block: easy  Patient's tolerance of the procedure: comfortable throughout block and no complaints  Medications:    Medications: bupivacaine (pf) (MARCAINE) injection 0.75% - Intraspinal   1.6 mL - 2/3/2023 11:42:00 AM

## 2023-02-03 NOTE — SUBJECTIVE & OBJECTIVE
OB History    Para Term  AB Living   5 2 2 0 2 2   SAB IAB Ectopic Multiple Live Births   0 2 0 0 2      # Outcome Date GA Lbr Zack/2nd Weight Sex Delivery Anes PTL Lv   5 Current            4 Term 10/22/21 41w1d  3.459 kg (7 lb 10 oz) M CS-LTranv Spinal N CLARIBEL      Complications: Gestational hypertension without significant proteinuria during pregnancy in third trimester, antepartum   3 Term 06 39w0d  3.487 kg (7 lb 11 oz) M CS-LTranv  N CLARIBEL      Birth Comments: failed IOL, preE      Complications: Preeclampsia, third trimester   2 IAB            1 IAB               Obstetric Comments   Gynhx: reg   H/o genital herpes, chlamydia     Past Medical History:   Diagnosis Date    Asthma     Hemoglobin S trait     Herpes      Past Surgical History:   Procedure Laterality Date    ANTERIOR CRUCIATE LIGAMENT REPAIR       SECTION       SECTION N/A 10/22/2021    Procedure:  SECTION;  Surgeon: Julie R. Jeansonne, MD;  Location: [facility] L&D;  Service: OB/GYN;  Laterality: N/A;     SECTION N/A 10/22/2021    Procedure:  SECTION;  Surgeon: Sissy Jean MD;  Location: [facility] L&D;  Service: OB/GYN;  Laterality: N/A;    DILATION AND CURETTAGE OF UTERUS N/A     DILATION AND CURETTAGE OF UTERUS USING SUCTION N/A        Facility-Administered Medications Prior to Admission   Medication    carboprost injection 250 mcg    ceFAZolin (ANCEF) 3 g in dextrose 5 % (D5W) 50 mL IVPB    famotidine (PF) injection 20 mg    lactated ringers bolus 1,000 mL    lactated ringers infusion    methylergonovine injection 200 mcg    miSOPROStoL tablet 800 mcg    oxytocin 30 units in 500 mL lactated ringers infusion (non-titrating)    oxytocin 30 units in 500 mL lactated ringers infusion (non-titrating)    sodium citrate-citric acid 500-334 mg/5 ml solution 30 mL     PTA Medications   Medication Sig    albuterol (PROVENTIL/VENTOLIN HFA) 90 mcg/actuation inhaler Inhale 2 puffs into the lungs  every 6 (six) hours as needed for Wheezing. Rescue    albuterol sulfate 2.5 mg/0.5 mL Nebu Take 2.5 mg by nebulization every 4 (four) hours as needed (asthma exacerbation).    aspirin (ECOTRIN) 81 MG EC tablet Take 1 tablet (81 mg total) by mouth once daily.    ferrous sulfate 325 (65 FE) MG EC tablet Take 1 tablet (325 mg total) by mouth once daily.    fluconazole (DIFLUCAN) 150 MG Tab TAKE 1 TABLET (150 MG TOTAL) BY MOUTH ONCE. REFILL AND RE-DOSE IF SYMPTOMS RECUR FOR 1 DOSE    PNV,calcium 72-iron-folic acid (PRENATAL VITAMIN PLUS LOW IRON) 27 mg iron- 1 mg Tab Take 1 tablet (1 each total) by mouth once daily.    valACYclovir (VALTREX) 1000 MG tablet Take 1 tablet (1,000 mg total) by mouth once daily.       Review of patient's allergies indicates:  No Known Allergies     Family History    None       Tobacco Use    Smoking status: Never    Smokeless tobacco: Never   Substance and Sexual Activity    Alcohol use: Not Currently     Comment: occ    Drug use: No    Sexual activity: Yes     Partners: Male     Review of Systems   Constitutional:  Negative for chills and fever.   Eyes:  Negative for visual disturbance.   Respiratory:  Negative for cough and wheezing.    Cardiovascular:  Negative for chest pain and palpitations.   Gastrointestinal:  Negative for abdominal pain, nausea and vomiting.   Genitourinary:  Negative for dysuria, frequency, hematuria, pelvic pain, vaginal bleeding, vaginal discharge and vaginal pain.   Neurological:  Negative for headaches.   Psychiatric/Behavioral:  Negative for depression.     Objective:     Vital Signs (Most Recent):    Vital Signs (24h Range):  BP: (148)/(78) 148/78        There is no height or weight on file to calculate BMI.    FHT: Cat 1 (reassuring)    Physical Exam:   Constitutional: She is oriented to person, place, and time. She appears well-developed and well-nourished.       Cardiovascular:  Normal rate.             Pulmonary/Chest: Effort normal. No respiratory  distress.        Abdominal: Soft. She exhibits no distension and no abdominal incision.                 Neurological: She is alert and oriented to person, place, and time.    Skin: Skin is warm and dry.    Psychiatric: She has a normal mood and affect.     Cervix:Deferred     Significant Labs:  Lab Results   Component Value Date    GROUPTRH O NEG 11/17/2022    HEPBSAG Non-reactive 11/08/2022    STREPBCULT No Group B Streptococcus isolated 01/11/2023       CBC:   Recent Labs   Lab 02/03/23  1015   WBC 6.26   RBC 3.58*   HGB 9.6*   HCT 29.0*      MCV 81*   MCH 26.8*   MCHC 33.1     I have personallly reviewed all pertinent lab results from the last 24 hours.

## 2023-02-03 NOTE — PROGRESS NOTES
Spoke with Dr. Loera to confirm that the tylenol and toradol is scheduled to be administered at 19:15. Per Dr. Loera, I can administer the tylenol and toradol to the patient upon her transfer to the unit. Requested the option of PRN benadryl for patient's itching as well.

## 2023-02-04 PROBLEM — Z98.891 HISTORY OF CESAREAN SECTION: Status: RESOLVED | Noted: 2022-11-15 | Resolved: 2023-02-04

## 2023-02-04 LAB
ABO + RH BLD: NORMAL
BASOPHILS # BLD AUTO: 0.03 K/UL (ref 0–0.2)
BASOPHILS NFR BLD: 0.4 % (ref 0–1.9)
BLD GP AB SCN CELLS X3 SERPL QL: NORMAL
DIFFERENTIAL METHOD: ABNORMAL
EOSINOPHIL # BLD AUTO: 0.3 K/UL (ref 0–0.5)
EOSINOPHIL NFR BLD: 4.1 % (ref 0–8)
ERYTHROCYTE [DISTWIDTH] IN BLOOD BY AUTOMATED COUNT: 16.1 % (ref 11.5–14.5)
FETAL CELL SCN BLD QL ROSETTE: NORMAL
HCT VFR BLD AUTO: 27.5 % (ref 37–48.5)
HGB BLD-MCNC: 9.1 G/DL (ref 12–16)
HIV 1+2 AB+HIV1 P24 AG SERPL QL IA: NORMAL
IMM GRANULOCYTES # BLD AUTO: 0.02 K/UL (ref 0–0.04)
IMM GRANULOCYTES NFR BLD AUTO: 0.2 % (ref 0–0.5)
LYMPHOCYTES # BLD AUTO: 1.5 K/UL (ref 1–4.8)
LYMPHOCYTES NFR BLD: 17.9 % (ref 18–48)
MCH RBC QN AUTO: 27.1 PG (ref 27–31)
MCHC RBC AUTO-ENTMCNC: 33.1 G/DL (ref 32–36)
MCV RBC AUTO: 82 FL (ref 82–98)
MONOCYTES # BLD AUTO: 0.6 K/UL (ref 0.3–1)
MONOCYTES NFR BLD: 6.7 % (ref 4–15)
NEUTROPHILS # BLD AUTO: 5.8 K/UL (ref 1.8–7.7)
NEUTROPHILS NFR BLD: 70.7 % (ref 38–73)
NRBC BLD-RTO: 0 /100 WBC
PLATELET # BLD AUTO: 216 K/UL (ref 150–450)
PMV BLD AUTO: 10.8 FL (ref 9.2–12.9)
RBC # BLD AUTO: 3.36 M/UL (ref 4–5.4)
RPR SER QL: NORMAL
WBC # BLD AUTO: 8.22 K/UL (ref 3.9–12.7)

## 2023-02-04 PROCEDURE — 85025 COMPLETE CBC W/AUTO DIFF WBC: CPT | Performed by: OBSTETRICS & GYNECOLOGY

## 2023-02-04 PROCEDURE — 36415 COLL VENOUS BLD VENIPUNCTURE: CPT | Performed by: OBSTETRICS & GYNECOLOGY

## 2023-02-04 PROCEDURE — 11000001 HC ACUTE MED/SURG PRIVATE ROOM

## 2023-02-04 PROCEDURE — 25000003 PHARM REV CODE 250: Performed by: ANESTHESIOLOGY

## 2023-02-04 PROCEDURE — 25000003 PHARM REV CODE 250: Performed by: OBSTETRICS & GYNECOLOGY

## 2023-02-04 PROCEDURE — 63600175 PHARM REV CODE 636 W HCPCS: Performed by: ANESTHESIOLOGY

## 2023-02-04 PROCEDURE — 85461 HEMOGLOBIN FETAL: CPT | Performed by: OBSTETRICS & GYNECOLOGY

## 2023-02-04 PROCEDURE — 99231 SBSQ HOSP IP/OBS SF/LOW 25: CPT | Mod: ,,, | Performed by: OBSTETRICS & GYNECOLOGY

## 2023-02-04 PROCEDURE — 99231 PR SUBSEQUENT HOSPITAL CARE,LEVL I: ICD-10-PCS | Mod: ,,, | Performed by: OBSTETRICS & GYNECOLOGY

## 2023-02-04 PROCEDURE — 86900 BLOOD TYPING SEROLOGIC ABO: CPT | Performed by: OBSTETRICS & GYNECOLOGY

## 2023-02-04 RX ORDER — IBUPROFEN 600 MG/1
600 TABLET ORAL EVERY 6 HOURS
Status: DISCONTINUED | OUTPATIENT
Start: 2023-02-05 | End: 2023-02-04

## 2023-02-04 RX ORDER — IBUPROFEN 600 MG/1
600 TABLET ORAL EVERY 6 HOURS
Status: DISCONTINUED | OUTPATIENT
Start: 2023-02-04 | End: 2023-02-06 | Stop reason: HOSPADM

## 2023-02-04 RX ORDER — HYDROCODONE BITARTRATE AND ACETAMINOPHEN 10; 325 MG/1; MG/1
1 TABLET ORAL EVERY 6 HOURS PRN
Status: DISCONTINUED | OUTPATIENT
Start: 2023-02-04 | End: 2023-02-06 | Stop reason: HOSPADM

## 2023-02-04 RX ORDER — HYDROCODONE BITARTRATE AND ACETAMINOPHEN 5; 325 MG/1; MG/1
1 TABLET ORAL EVERY 6 HOURS PRN
Status: DISCONTINUED | OUTPATIENT
Start: 2023-02-04 | End: 2023-02-06 | Stop reason: HOSPADM

## 2023-02-04 RX ADMIN — ACETAMINOPHEN 650 MG: 325 TABLET ORAL at 11:02

## 2023-02-04 RX ADMIN — MUPIROCIN: 20 OINTMENT TOPICAL at 08:02

## 2023-02-04 RX ADMIN — KETOROLAC TROMETHAMINE 30 MG: 30 INJECTION, SOLUTION INTRAMUSCULAR; INTRAVENOUS at 11:02

## 2023-02-04 RX ADMIN — IBUPROFEN 600 MG: 600 TABLET ORAL at 07:02

## 2023-02-04 RX ADMIN — KETOROLAC TROMETHAMINE 30 MG: 30 INJECTION, SOLUTION INTRAMUSCULAR; INTRAVENOUS at 05:02

## 2023-02-04 RX ADMIN — ACETAMINOPHEN 650 MG: 325 TABLET ORAL at 05:02

## 2023-02-04 RX ADMIN — HYDROCODONE BITARTRATE AND ACETAMINOPHEN 1 TABLET: 5; 325 TABLET ORAL at 03:02

## 2023-02-04 RX ADMIN — OXYCODONE 5 MG: 5 TABLET ORAL at 08:02

## 2023-02-04 RX ADMIN — OXYCODONE 5 MG: 5 TABLET ORAL at 12:02

## 2023-02-04 RX ADMIN — HYDROCODONE BITARTRATE AND ACETAMINOPHEN 1 TABLET: 10; 325 TABLET ORAL at 08:02

## 2023-02-04 RX ADMIN — OXYCODONE 5 MG: 5 TABLET ORAL at 04:02

## 2023-02-04 NOTE — PROGRESS NOTES
[facility] Mother & Baby  Obstetrics  Postpartum Progress Note    Patient Name: Ole Allen  MRN: [id number]  Admission Date: 2/3/2023  Hospital Length of Stay: 1 days  Attending Physician: Avila Herman MD  Primary Care Provider: Umesh He MD    Subjective:     Principal Problem:Status post  section    Hospital Course:  2/3/23 s/p R LTCD  23, POD # 1, doing well, passing flatus, breastfeeding, mild range BP x 2 last night, now normotensive      Interval History:     She is doing well this morning. She is tolerating a regular diet without nausea or vomiting. She is voiding spontaneously. She is ambulating. She has passed flatus, and has not a BM. Vaginal bleeding is mild. She denies fever or chills. Abdominal pain is mild and controlled with oral medications. She Is breastfeeding. She desires circumcision for her male baby: no.    Objective:     Vital Signs (Most Recent):  Temp: 97.7 °F (36.5 °C) (23 033)  Pulse: 82 (23 033)  Resp: 20 (23 0432)  BP: 135/84 (23 033)  SpO2: 98 % (23 0338) Vital Signs (24h Range):  Temp:  [97.6 °F (36.4 °C)-98.2 °F (36.8 °C)] 97.7 °F (36.5 °C)  Pulse:  [62-90] 82  Resp:  [18-20] 20  SpO2:  [97 %-99 %] 98 %  BP: (126-154)/(69-86) 135/84        There is no height or weight on file to calculate BMI.      Intake/Output Summary (Last 24 hours) at 2023 0738  Last data filed at 2023 0400  Gross per 24 hour   Intake 1980 ml   Output 2104 ml   Net -124 ml         Significant Labs:  Lab Results   Component Value Date    GROUPTRH O NEG 2023    HEPBSAG Non-reactive 2022    STREPBCULT No Group B Streptococcus isolated 2023     Recent Labs   Lab 23  1015   HGB 9.6*   HCT 29.0*       I have personallly reviewed all pertinent lab results from the last 24 hours.  Recent Lab Results         23  1015        Baso # 0.03       Basophil % 0.5       Differential Method Automated       Eos # 0.2        Eosinophil % 3.2       Gran # (ANC) 3.9       Gran % 62.8       Group & Rh O NEG       Hematocrit 29.0       Hemoglobin 9.6       Immature Grans (Abs) 0.04  Comment: Mild elevation in immature granulocytes is non specific and   can be seen in a variety of conditions including stress response,   acute inflammation, trauma and pregnancy. Correlation with other   laboratory and clinical findings is essential.         Immature Granulocytes 0.6       INDIRECT WILLIAM NEG       Lymph # 1.7       Lymph % 27.0       MCH 26.8       MCHC 33.1       MCV 81       Mono # 0.4       Mono % 5.9       MPV 10.5       nRBC 0       Platelets 229       RBC 3.58       RDW 16.1       WBC 6.26               Physical Exam:   Constitutional: She is oriented to person, place, and time. She appears well-developed and well-nourished.    HENT:   Head: Normocephalic and atraumatic.    Eyes: Pupils are equal, round, and reactive to light. EOM are normal.     Cardiovascular:       Exam reveals no clubbing and no cyanosis.        Pulmonary/Chest: Effort normal.        Abdominal: Soft. She exhibits abdominal incision. She exhibits no mass. There is no rebound and no guarding. No hernia.   Dressing d/c/i             Musculoskeletal: Normal range of motion and moves all extremeties.       Neurological: She is alert and oriented to person, place, and time.    Skin: Skin is warm. No cyanosis. Nails show no clubbing.    Psychiatric: She has a normal mood and affect. Her behavior is normal. Thought content normal.     Review of Systems   Constitutional:  Negative for appetite change, chills and fever.   Respiratory:  Negative for shortness of breath.    Cardiovascular:  Negative for chest pain.   Gastrointestinal:  Negative for abdominal pain, blood in stool, constipation, diarrhea, nausea and vomiting.   Endocrine: Negative for hair loss and hot flashes.   Genitourinary:  Negative for decreased libido, dysmenorrhea, dyspareunia, dysuria, genital sores,  menorrhagia, menstrual problem, pelvic pain, vaginal discharge, vaginal pain, urinary incontinence and vaginal odor.   Musculoskeletal:  Negative for back pain.   Integumentary:  Negative for rash, acne, hair changes, breast mass, nipple discharge and breast skin changes.   Breast: Negative for mass, mastodynia, nipple discharge and skin changes    Assessment/Plan:     33 y.o. female  for:    * Status post  section  Routine pp care  Morning cbc pending  Monitor BP      Disposition: As patient meets milestones, will plan to discharge .    Kyra Tovar MD  Obstetrics  [facility] [place] - Mother & Baby

## 2023-02-04 NOTE — L&D DELIVERY NOTE
[facility]   Section   Operative Note    SUMMARY     Date of Procedure: 2/3/2023     Procedure: Procedure(s) (LRB):   SECTION (N/A)    Surgeon(s) and Role:     * Avila Herman MD - Primary    Assistant: Radha Peacock PA-C    Pre-Operative Diagnosis:   History of  Section x2  AMA  Sickle Cell Trait Anemia  Rh Negative  History of HSV     Post-Operative Diagnosis: Post-Op Diagnosis Codes:     * Pregnancy [Z34.90]    Anesthesia: Spinal/Epidural    Technical Procedures Used: Repeat Low Transverse  Section via Pfannenstiel           Description of the Findings of the Procedure:  Female fetus in cephalic presentation that flipped cherelle breech  Difficulty bringing the head out of hysterotomy so vacuum attempted but seal could not be verified  Presentation flipped to cherelle breech and delivered without incidence  Normal fallopian tubes and ovaries    Complications: No    Blood Loss: 104 cc     With patient in supine position, the legs are  and Dugan Catheter placed and positioning to supine done.   Abdomen prepped with Chloroprep and 3 minute drying time allowed prior to draping of the abdomen.   Time out taken with OR team members.  Pfannenstiel Incision made through the skin, transverse fascial incision developed, rectus muscles  in the midline and the peritoneum entered.   Fascial  adhesions noted and lysed with bovie.  The lower uterine segment and position of the fetus identified.   Bladder flap taken down through transverse peritoneal incision.    Low Transverse Incision made through well developer lower uterine segment and extended laterally with blunt dissection.   Clear fluid noted.  Infant delivered flipped to cherelle breech presentation and delivered without incidence. Cord clamped after one minute and  handed to attending nurse.  Cord blood taken, placenta delivered.  The uterus was exteriorized.  The edges of the uterine incision  are grasped with Curry clamps at the angles and the inferior and superior midline edges of the incision.    Closure with running lock 0 Monocryl, starting at the left angle and tying on the right.  A second 0 Monocryl was used for an imbricating layer over the hysterotomy.  Observation for bleeding with suture of any bleeding along the hysterotomy line.   With good hemostasis noted, the anterior pelvis is rinsed with sterile saline.   Right and left adnexa with normal anatomy.     Closure of the abdomen with 2 0 Vicryl running of the peritoneum and rectus muscles, fascial closure with 0 Vicryl starting at the each angle and tying the knot in the midline angle.  3-0 plain gut was used to reapproximate the subcutaneous tissue.  Skin closure with 4 0 Monocryl subcuticular.  Wound dressed with Aquacel.          Specimens:   Specimen (24h ago, onward)      None            Condition: Good    Disposition: PACU - hemodynamically stable.    Attestation: Good         Delivery Information for Girl Ole Allen    Birth information:  YOB: 2025   Time of birth: 12:01 PM   Sex: female   Head Delivery Date/Time: 2/3/2023 12:01 PM   Delivery type: , Low Transverse   Gestational Age: 39w3d    Delivery Providers    Delivering clinician: Avila Herman MD   Provider Role    Elizabeth Nelson, RN     Marino Garg, EVERETT Monroe, ALLI Miller               Measurements    Weight: 3490 g  Weight (lbs): 7 lb 11.1 oz  Length:          Apgars    Living status: Living  Apgars:  1 min.:  5 min.:  10 min.:  15 min.:  20 min.:    Skin color:  0  1       Heart rate:  1  2       Reflex irritability:  2  2       Muscle tone:  1  2       Respiratory effort:  1  2       Total:  5  9       Apgars assigned by: GENEVIEVE MOELLER         Operative Delivery    Forceps attempted?: No  Vacuum extractor attempted?: No               Presentation    Presentation: Seun Breech  Position:  Right Occiput Transverse           Interventions/Resuscitation    Method: Bulb Suctioning, Tactile Stimulation, PPV, Blow By Oxygen       Cord    Vessels: 3 vessels  Complications: None  Delayed Cord Clamping?: Yes  Cord Clamped Date/Time: 2/3/2023 12:01 PM  Cord Blood Disposition: Sent with Baby  Gases Sent?: No  Stem Cell Collection (by MD): No       Placenta    Placenta delivery date/time: 2/3/2023 1202  Placenta removal: Manual removal  Placenta appearance: Intact  Placenta disposition: family           Labor Events:       labor: No     Labor Onset Date/Time:         Dilation Complete Date/Time:         Start Pushing Date/Time:         Start Pushing Date/Time:       Rupture Date/Time:            Rupture type:            Fluid Amount:         Fluid Color:         Fluid Odor:         Membrane Status:                  steroids: None     Antibiotics given for GBS: No     Induction:       Indications for induction:        Augmentation:       Indications for augmentation:       Labor complications: None     Additional complications:          Cervical ripening:                     Delivery:      Episiotomy:       Indication for Episiotomy:       Perineal Lacerations:   Repaired:      Periurethral Laceration:   Repaired:     Labial Laceration:   Repaired:     Sulcus Laceration:   Repaired:     Vaginal Laceration:   Repaired:     Cervical Laceration:   Repaired:     Repair suture:       Repair # of packets:       Last Value - EBL - Nursing (mL):       Sum - EBL - Nursing (mL): 0     Last Value - EBL - Anesthesia (mL):        Calculated QBL (mL):         Vaginal Sweep Performed:       Surgicount Correct:         Other providers:       Anesthesia    Method: Spinal          Details (if applicable):  Trial of Labor No    Categorization: Repeat    Priority: Routine   Indications for : Repeat Section   Incision Type: low transverse     Additional  information:  Forceps:     Vacuum:    Breech:    Observed anomalies    Other (Comments):

## 2023-02-04 NOTE — SUBJECTIVE & OBJECTIVE
Interval History:     She is doing well this morning. She is tolerating a regular diet without nausea or vomiting. She is voiding spontaneously. She is ambulating. She has passed flatus, and has not a BM. Vaginal bleeding is mild. She denies fever or chills. Abdominal pain is mild and controlled with oral medications. She Is breastfeeding. She desires circumcision for her male baby: no.    Objective:     Vital Signs (Most Recent):  Temp: 97.7 °F (36.5 °C) (02/04/23 0337)  Pulse: 82 (02/04/23 0338)  Resp: 20 (02/04/23 0432)  BP: 135/84 (02/04/23 0337)  SpO2: 98 % (02/04/23 0338) Vital Signs (24h Range):  Temp:  [97.6 °F (36.4 °C)-98.2 °F (36.8 °C)] 97.7 °F (36.5 °C)  Pulse:  [62-90] 82  Resp:  [18-20] 20  SpO2:  [97 %-99 %] 98 %  BP: (126-154)/(69-86) 135/84        There is no height or weight on file to calculate BMI.      Intake/Output Summary (Last 24 hours) at 2/4/2023 0738  Last data filed at 2/4/2023 0400  Gross per 24 hour   Intake 1980 ml   Output 2104 ml   Net -124 ml         Significant Labs:  Lab Results   Component Value Date    GROUPTRH O NEG 02/03/2023    HEPBSAG Non-reactive 11/08/2022    STREPBCULT No Group B Streptococcus isolated 01/11/2023     Recent Labs   Lab 02/03/23  1015   HGB 9.6*   HCT 29.0*       I have personallly reviewed all pertinent lab results from the last 24 hours.  Recent Lab Results         02/03/23  1015        Baso # 0.03       Basophil % 0.5       Differential Method Automated       Eos # 0.2       Eosinophil % 3.2       Gran # (ANC) 3.9       Gran % 62.8       Group & Rh O NEG       Hematocrit 29.0       Hemoglobin 9.6       Immature Grans (Abs) 0.04  Comment: Mild elevation in immature granulocytes is non specific and   can be seen in a variety of conditions including stress response,   acute inflammation, trauma and pregnancy. Correlation with other   laboratory and clinical findings is essential.         Immature Granulocytes 0.6       INDIRECT WILLIAM NEG       Lymph #  1.7       Lymph % 27.0       MCH 26.8       MCHC 33.1       MCV 81       Mono # 0.4       Mono % 5.9       MPV 10.5       nRBC 0       Platelets 229       RBC 3.58       RDW 16.1       WBC 6.26               Physical Exam:   Constitutional: She is oriented to person, place, and time. She appears well-developed and well-nourished.    HENT:   Head: Normocephalic and atraumatic.    Eyes: Pupils are equal, round, and reactive to light. EOM are normal.     Cardiovascular:       Exam reveals no clubbing and no cyanosis.        Pulmonary/Chest: Effort normal.        Abdominal: Soft. She exhibits abdominal incision. She exhibits no mass. There is no rebound and no guarding. No hernia.   Dressing d/c/i             Musculoskeletal: Normal range of motion and moves all extremeties.       Neurological: She is alert and oriented to person, place, and time.    Skin: Skin is warm. No cyanosis. Nails show no clubbing.    Psychiatric: She has a normal mood and affect. Her behavior is normal. Thought content normal.     Review of Systems   Constitutional:  Negative for appetite change, chills and fever.   Respiratory:  Negative for shortness of breath.    Cardiovascular:  Negative for chest pain.   Gastrointestinal:  Negative for abdominal pain, blood in stool, constipation, diarrhea, nausea and vomiting.   Endocrine: Negative for hair loss and hot flashes.   Genitourinary:  Negative for decreased libido, dysmenorrhea, dyspareunia, dysuria, genital sores, menorrhagia, menstrual problem, pelvic pain, vaginal discharge, vaginal pain, urinary incontinence and vaginal odor.   Musculoskeletal:  Negative for back pain.   Integumentary:  Negative for rash, acne, hair changes, breast mass, nipple discharge and breast skin changes.   Breast: Negative for mass, mastodynia, nipple discharge and skin changes

## 2023-02-04 NOTE — HOSPITAL COURSE
2/3/23 s/p R LTCD  2/4/23, POD # 1, doing well, passing flatus, breastfeeding, mild range BP x 2 last night, now normotensive  2/5/23, POD #2, intermittent mild range BP continue, denies preE symptoms, passing flatus- feeling constipated  02/06/2023: POD#3 s/p RLTCS. Patient is ambulating, voiding and tolerating PO. She had elevated BPs and was started on Procardia. Blood pressures have now improved. She is doing well. Pain controlled on PO medication.

## 2023-02-05 PROBLEM — O13.9 GESTATIONAL HYPERTENSION, ANTEPARTUM: Status: ACTIVE | Noted: 2021-10-21

## 2023-02-05 LAB
ALBUMIN SERPL BCP-MCNC: 2.6 G/DL (ref 3.5–5.2)
ALP SERPL-CCNC: 110 U/L (ref 55–135)
ALT SERPL W/O P-5'-P-CCNC: 8 U/L (ref 10–44)
ANION GAP SERPL CALC-SCNC: 8 MMOL/L (ref 8–16)
AST SERPL-CCNC: 17 U/L (ref 10–40)
BASOPHILS # BLD AUTO: 0.03 K/UL (ref 0–0.2)
BASOPHILS NFR BLD: 0.4 % (ref 0–1.9)
BILIRUB SERPL-MCNC: 0.5 MG/DL (ref 0.1–1)
BUN SERPL-MCNC: 4 MG/DL (ref 6–20)
CALCIUM SERPL-MCNC: 9.3 MG/DL (ref 8.7–10.5)
CHLORIDE SERPL-SCNC: 105 MMOL/L (ref 95–110)
CO2 SERPL-SCNC: 24 MMOL/L (ref 23–29)
CREAT SERPL-MCNC: 0.6 MG/DL (ref 0.5–1.4)
CREAT UR-MCNC: 85.7 MG/DL (ref 15–325)
DIFFERENTIAL METHOD: ABNORMAL
EOSINOPHIL # BLD AUTO: 0.3 K/UL (ref 0–0.5)
EOSINOPHIL NFR BLD: 4.3 % (ref 0–8)
ERYTHROCYTE [DISTWIDTH] IN BLOOD BY AUTOMATED COUNT: 16.3 % (ref 11.5–14.5)
EST. GFR  (NO RACE VARIABLE): >60 ML/MIN/1.73 M^2
GLUCOSE SERPL-MCNC: 81 MG/DL (ref 70–110)
HCT VFR BLD AUTO: 28.2 % (ref 37–48.5)
HGB BLD-MCNC: 9.2 G/DL (ref 12–16)
IMM GRANULOCYTES # BLD AUTO: 0.03 K/UL (ref 0–0.04)
IMM GRANULOCYTES NFR BLD AUTO: 0.4 % (ref 0–0.5)
INJECT RH IG VOL PATIENT: NORMAL ML
LDH SERPL L TO P-CCNC: 199 U/L (ref 110–260)
LYMPHOCYTES # BLD AUTO: 1.7 K/UL (ref 1–4.8)
LYMPHOCYTES NFR BLD: 23.2 % (ref 18–48)
MCH RBC QN AUTO: 27.1 PG (ref 27–31)
MCHC RBC AUTO-ENTMCNC: 32.6 G/DL (ref 32–36)
MCV RBC AUTO: 83 FL (ref 82–98)
MONOCYTES # BLD AUTO: 0.5 K/UL (ref 0.3–1)
MONOCYTES NFR BLD: 7.3 % (ref 4–15)
NEUTROPHILS # BLD AUTO: 4.6 K/UL (ref 1.8–7.7)
NEUTROPHILS NFR BLD: 64.4 % (ref 38–73)
NRBC BLD-RTO: 0 /100 WBC
PLATELET # BLD AUTO: 227 K/UL (ref 150–450)
PMV BLD AUTO: 11.1 FL (ref 9.2–12.9)
POTASSIUM SERPL-SCNC: 3.9 MMOL/L (ref 3.5–5.1)
PROT SERPL-MCNC: 6.5 G/DL (ref 6–8.4)
PROT UR-MCNC: 33 MG/DL
PROT/CREAT UR: 0.39 MG/G{CREAT} (ref 0–0.2)
RBC # BLD AUTO: 3.4 M/UL (ref 4–5.4)
SODIUM SERPL-SCNC: 137 MMOL/L (ref 136–145)
URATE SERPL-MCNC: 4.7 MG/DL (ref 2.4–5.7)
WBC # BLD AUTO: 7.15 K/UL (ref 3.9–12.7)

## 2023-02-05 PROCEDURE — 99231 SBSQ HOSP IP/OBS SF/LOW 25: CPT | Mod: ,,, | Performed by: OBSTETRICS & GYNECOLOGY

## 2023-02-05 PROCEDURE — 63600519 RHOGAM PHARM REV CODE 636 ALT 250 W HCPCS: Performed by: OBSTETRICS & GYNECOLOGY

## 2023-02-05 PROCEDURE — 83615 LACTATE (LD) (LDH) ENZYME: CPT | Performed by: OBSTETRICS & GYNECOLOGY

## 2023-02-05 PROCEDURE — 25000003 PHARM REV CODE 250: Performed by: OBSTETRICS & GYNECOLOGY

## 2023-02-05 PROCEDURE — 36415 COLL VENOUS BLD VENIPUNCTURE: CPT | Performed by: OBSTETRICS & GYNECOLOGY

## 2023-02-05 PROCEDURE — 99231 PR SUBSEQUENT HOSPITAL CARE,LEVL I: ICD-10-PCS | Mod: ,,, | Performed by: OBSTETRICS & GYNECOLOGY

## 2023-02-05 PROCEDURE — 84550 ASSAY OF BLOOD/URIC ACID: CPT | Performed by: OBSTETRICS & GYNECOLOGY

## 2023-02-05 PROCEDURE — 11000001 HC ACUTE MED/SURG PRIVATE ROOM

## 2023-02-05 PROCEDURE — 85025 COMPLETE CBC W/AUTO DIFF WBC: CPT | Performed by: OBSTETRICS & GYNECOLOGY

## 2023-02-05 PROCEDURE — 25000003 PHARM REV CODE 250: Performed by: ANESTHESIOLOGY

## 2023-02-05 PROCEDURE — 80053 COMPREHEN METABOLIC PANEL: CPT | Performed by: OBSTETRICS & GYNECOLOGY

## 2023-02-05 PROCEDURE — 84156 ASSAY OF PROTEIN URINE: CPT | Performed by: OBSTETRICS & GYNECOLOGY

## 2023-02-05 RX ORDER — HYDROCORTISONE 25 MG/G
CREAM TOPICAL
Status: DISCONTINUED | OUTPATIENT
Start: 2023-02-05 | End: 2023-02-06 | Stop reason: HOSPADM

## 2023-02-05 RX ORDER — SIMETHICONE 80 MG
1 TABLET,CHEWABLE ORAL 3 TIMES DAILY PRN
Status: DISCONTINUED | OUTPATIENT
Start: 2023-02-05 | End: 2023-02-06 | Stop reason: HOSPADM

## 2023-02-05 RX ORDER — ADHESIVE BANDAGE
30 BANDAGE TOPICAL DAILY PRN
Status: DISCONTINUED | OUTPATIENT
Start: 2023-02-05 | End: 2023-02-06 | Stop reason: HOSPADM

## 2023-02-05 RX ORDER — NIFEDIPINE 30 MG/1
30 TABLET, EXTENDED RELEASE ORAL DAILY
Status: DISCONTINUED | OUTPATIENT
Start: 2023-02-05 | End: 2023-02-06 | Stop reason: HOSPADM

## 2023-02-05 RX ORDER — BISACODYL 10 MG
10 SUPPOSITORY, RECTAL RECTAL DAILY PRN
Status: DISCONTINUED | OUTPATIENT
Start: 2023-02-05 | End: 2023-02-05

## 2023-02-05 RX ADMIN — HYDROCODONE BITARTRATE AND ACETAMINOPHEN 1 TABLET: 10; 325 TABLET ORAL at 09:02

## 2023-02-05 RX ADMIN — NIFEDIPINE 30 MG: 30 TABLET, FILM COATED, EXTENDED RELEASE ORAL at 05:02

## 2023-02-05 RX ADMIN — HYDROCODONE BITARTRATE AND ACETAMINOPHEN 1 TABLET: 10; 325 TABLET ORAL at 03:02

## 2023-02-05 RX ADMIN — MUPIROCIN: 20 OINTMENT TOPICAL at 09:02

## 2023-02-05 RX ADMIN — IBUPROFEN 600 MG: 600 TABLET ORAL at 05:02

## 2023-02-05 RX ADMIN — HUMAN RHO(D) IMMUNE GLOBULIN 300 MCG: 300 INJECTION, SOLUTION INTRAMUSCULAR at 12:02

## 2023-02-05 RX ADMIN — SIMETHICONE 80 MG: 80 TABLET, CHEWABLE ORAL at 03:02

## 2023-02-05 RX ADMIN — MAGNESIUM HYDROXIDE 2400 MG: 400 SUSPENSION ORAL at 12:02

## 2023-02-05 RX ADMIN — DIPHENHYDRAMINE HYDROCHLORIDE 25 MG: 25 CAPSULE ORAL at 09:02

## 2023-02-05 RX ADMIN — IBUPROFEN 600 MG: 600 TABLET ORAL at 12:02

## 2023-02-05 RX ADMIN — IBUPROFEN 600 MG: 600 TABLET ORAL at 06:02

## 2023-02-05 RX ADMIN — HYDROCODONE BITARTRATE AND ACETAMINOPHEN 1 TABLET: 10; 325 TABLET ORAL at 05:02

## 2023-02-05 NOTE — NURSING
Spoke with Dr Tovar in regards to continued high BP. Instructed to take two more readings 10-20 minutes apart and document. Patient updated with plan for BP monitoring and potential for needing IV support if readings remain elevated.

## 2023-02-05 NOTE — NURSING
MD Tovar aware of patient's elevated BPs. Work-up in progress. Waiting on patient to provide urine sample.

## 2023-02-05 NOTE — PLAN OF CARE
"Milk of magnesia given for constipation. Continuing to use high dose hydrocodone for pain control, which is still majority to Left side of  LTV, but also includes generalized cramping with breastfeeding. Up out of bed without issue. SCDs remain on when patient sleeping. Significant, friends, and family visiting with patient throughout the day.     Problem: Adult Inpatient Plan of Care  Goal: Plan of Care Review  Outcome: Ongoing, Progressing  Goal: Patient-Specific Goal (Individualized)  Outcome: Ongoing, Progressing  Flowsheets (Taken 2023 1457)  Anxieties, Fears or Concerns: High blood pressures. Asymptomatic. Admits to headaches when BP has been high when she was pregnant but states she never had "pressure issues".  Individualized Care Needs: Labs sent for evaluation of postpartum HTN. Continuing to monitor.  Goal: Absence of Hospital-Acquired Illness or Injury  Outcome: Ongoing, Progressing  Goal: Optimal Comfort and Wellbeing  Outcome: Ongoing, Progressing  Goal: Readiness for Transition of Care  Outcome: Ongoing, Progressing     Problem: Bariatric Environmental Safety  Goal: Safety Maintained with Care  Outcome: Ongoing, Progressing     Problem:  Fall Injury Risk  Goal: Absence of Fall, Infant Drop and Related Injury  Outcome: Ongoing, Progressing     Problem: Infection  Goal: Absence of Infection Signs and Symptoms  Outcome: Ongoing, Progressing        "

## 2023-02-05 NOTE — PLAN OF CARE
Problem: Adult Inpatient Plan of Care  Goal: Plan of Care Review  Outcome: Ongoing, Progressing  Goal: Patient-Specific Goal (Individualized)  Outcome: Ongoing, Progressing  Flowsheets (Taken 2023)  Anxieties, Fears or Concerns: Does not have a high pain tolerance. Does not like to experience abdominal cramping or  incision pain. Worried about doing too much and being in the type of pain she was with her last child a year ago.   Individualized Care Needs: Spoke with MD La rodriguez 2 today around prn pain medications to meet patient's pain needs. Heating pad in room. Abdominal binder in place. Splinting when coughing instructed.  Goal: Absence of Hospital-Acquired Illness or Injury  Outcome: Ongoing, Progressing  Goal: Optimal Comfort and Wellbeing  Outcome: Ongoing, Progressing  Goal: Readiness for Transition of Care  Outcome: Ongoing, Progressing     Problem: Bariatric Environmental Safety  Goal: Safety Maintained with Care  Outcome: Ongoing, Progressing     Problem:  Fall Injury Risk  Goal: Absence of Fall, Infant Drop and Related Injury  Outcome: Ongoing, Progressing     Problem: Infection  Goal: Absence of Infection Signs and Symptoms  Outcome: Ongoing, Progressing     Problem: Adjustment to Role Transition (Postpartum Vaginal Delivery)  Goal: Successful Maternal Role Transition  Outcome: Ongoing, Progressing     Problem: Bleeding (Postpartum Vaginal Delivery)  Goal: Hemostasis  Outcome: Ongoing, Progressing     Problem: Infection (Postpartum Vaginal Delivery)  Goal: Absence of Infection Signs/Symptoms  Outcome: Ongoing, Progressing     Problem: Pain (Postpartum Vaginal Delivery)  Goal: Acceptable Pain Control  Outcome: Ongoing, Not Progressing     Problem: Urinary Retention (Postpartum Vaginal Delivery)  Goal: Effective Urinary Elimination  Outcome: Ongoing, Progressing

## 2023-02-05 NOTE — PROGRESS NOTES
[facility]  Obstetrics  Postpartum Progress Note    Patient Name: Ole Allen  MRN: [id number]  Admission Date: 2/3/2023  Hospital Length of Stay: 2 days  Attending Physician: Avila Herman MD  Primary Care Provider: Umesh He MD    Subjective:     Principal Problem:Status post  section    Hospital Course:  2/3/23 s/p R LTCD  23, POD # 1, doing well, passing flatus, breastfeeding, mild range BP x 2 last night, now normotensive  23, POD #2, intermittent mild range BP continue, denies preE symptoms, passing flatus- feeling constipated      Interval History:     She is doing well this morning. She is tolerating a regular diet without nausea or vomiting. She is voiding spontaneously. She is ambulating. She has passed flatus, and has not had a BM. Vaginal bleeding is mild. She denies fever or chills. Abdominal pain is mild and controlled with oral medications. She Is breastfeeding. She desires circumcision for her male baby: not applicable.    Objective:     Vital Signs (Most Recent):  Temp: 98.1 °F (36.7 °C) (23 0333)  Pulse: 95 (23 0333)  Resp: 20 (23 035)  BP: (!) 146/74 (23)  SpO2: 97 % (23)   Vital Signs (24h Range):  Temp:  [97.8 °F (36.6 °C)-99.1 °F (37.3 °C)] 98.1 °F (36.7 °C)  Pulse:  [] 95  Resp:  [17-20] 20  SpO2:  [94 %-99 %] 97 %  BP: (133-146)/(71-96) 146/74        There is no height or weight on file to calculate BMI.    No intake or output data in the 24 hours ending 23 0747      Significant Labs:  Lab Results   Component Value Date    GROUPTRH O NEG 2023    HEPBSAG Non-reactive 2022    STREPBCULT No Group B Streptococcus isolated 2023     Recent Labs   Lab 23  0659   HGB 9.2*   HCT 28.2*       I have personallly reviewed all pertinent lab results from the last 24 hours.  Recent Lab Results         23  0659   23  1500        Albumin 2.6         Alkaline  Phosphatase 110         ALT 8         Anion Gap 8         AST 17         Baso # 0.03         Basophil % 0.4         BILIRUBIN TOTAL 0.5  Comment: For infants and newborns, interpretation of results should be based  on gestational age, weight and in agreement with clinical  observations.    Premature Infant recommended reference ranges:  Up to 24 hours.............<8.0 mg/dL  Up to 48 hours............<12.0 mg/dL  3-5 days..................<15.0 mg/dL  6-29 days.................<15.0 mg/dL           BUN 4         Calcium 9.3         Chloride 105         CO2 24         Creatinine 0.6         Differential Method Automated         eGFR >60         Eos # 0.3         Eosinophil % 4.3         Glucose 81         Gran # (ANC) 4.6         Gran % 64.4         Group & Rh   O NEG       Hematocrit 28.2         Hemoglobin 9.2         Immature Grans (Abs) 0.03  Comment: Mild elevation in immature granulocytes is non specific and   can be seen in a variety of conditions including stress response,   acute inflammation, trauma and pregnancy. Correlation with other   laboratory and clinical findings is essential.           Immature Granulocytes 0.4         INDIRECT WILLIAM   NEG         Comment: Results are increased in hemolyzed samples.         Lymph # 1.7         Lymph % 23.2         MCH 27.1         MCHC 32.6         MCV 83         Mono # 0.5         Mono % 7.3         MPV 11.1         nRBC 0         Platelets 227         Potassium 3.9         PROTEIN TOTAL 6.5         RBC 3.40         RDW 16.3         [place] [facility] (Fetal Bleed Screen)   NEG       Sodium 137         Uric Acid 4.7         WBC 7.15                 Physical Exam:   Constitutional: She is oriented to person, place, and time. She appears well-developed and well-nourished.    HENT:   Head: Normocephalic and atraumatic.    Eyes: Pupils are equal, round, and reactive to light. EOM are normal.     Cardiovascular:       Exam reveals no clubbing and no cyanosis.         Pulmonary/Chest: Effort normal.        Abdominal: Soft. She exhibits no mass. There is no rebound and no guarding. No hernia.             Musculoskeletal: Normal range of motion and moves all extremeties.       Neurological: She is alert and oriented to person, place, and time.    Skin: Skin is warm. No cyanosis. Nails show no clubbing.    Psychiatric: She has a normal mood and affect. Her behavior is normal. Thought content normal.     Review of Systems   Constitutional:  Negative for appetite change, chills and fever.   Respiratory:  Negative for shortness of breath.    Cardiovascular:  Negative for chest pain.   Gastrointestinal:  Negative for abdominal pain, blood in stool, constipation, diarrhea, nausea and vomiting.   Endocrine: Negative for hair loss and hot flashes.   Genitourinary:  Negative for decreased libido, dysmenorrhea, dyspareunia, dysuria, genital sores, menorrhagia, menstrual problem, pelvic pain, vaginal discharge, vaginal pain, urinary incontinence and vaginal odor.   Musculoskeletal:  Negative for back pain.   Integumentary:  Negative for rash, acne, hair changes, breast mass, nipple discharge and breast skin changes.   Breast: Negative for mass, mastodynia, nipple discharge and skin changes    Assessment/Plan:     33 y.o. female  for:    * Status post  section  Routine pp care  Morning cbc pending  Monitor BP    Gestational hypertension, antepartum  Mild range BP pp, will get preE labs          Disposition: As patient meets milestones, will plan to discharge .    Kyra Tovar MD  Obstetrics  [facility] [place] [facility]

## 2023-02-05 NOTE — SUBJECTIVE & OBJECTIVE
Interval History:     She is doing well this morning. She is tolerating a regular diet without nausea or vomiting. She is voiding spontaneously. She is ambulating. She has passed flatus, and has not had a BM. Vaginal bleeding is mild. She denies fever or chills. Abdominal pain is mild and controlled with oral medications. She Is breastfeeding. She desires circumcision for her male baby: not applicable.    Objective:     Vital Signs (Most Recent):  Temp: 98.1 °F (36.7 °C) (02/05/23 0333)  Pulse: 95 (02/05/23 0333)  Resp: 20 (02/05/23 0354)  BP: (!) 146/74 (02/05/23 0333)  SpO2: 97 % (02/05/23 0333)   Vital Signs (24h Range):  Temp:  [97.8 °F (36.6 °C)-99.1 °F (37.3 °C)] 98.1 °F (36.7 °C)  Pulse:  [] 95  Resp:  [17-20] 20  SpO2:  [94 %-99 %] 97 %  BP: (133-146)/(71-96) 146/74        There is no height or weight on file to calculate BMI.    No intake or output data in the 24 hours ending 02/05/23 0747      Significant Labs:  Lab Results   Component Value Date    GROUPTRH O NEG 02/04/2023    HEPBSAG Non-reactive 11/08/2022    STREPBCULT No Group B Streptococcus isolated 01/11/2023     Recent Labs   Lab 02/05/23  0659   HGB 9.2*   HCT 28.2*       I have personallly reviewed all pertinent lab results from the last 24 hours.  Recent Lab Results         02/05/23  0659   02/04/23  1500        Albumin 2.6         Alkaline Phosphatase 110         ALT 8         Anion Gap 8         AST 17         Baso # 0.03         Basophil % 0.4         BILIRUBIN TOTAL 0.5  Comment: For infants and newborns, interpretation of results should be based  on gestational age, weight and in agreement with clinical  observations.    Premature Infant recommended reference ranges:  Up to 24 hours.............<8.0 mg/dL  Up to 48 hours............<12.0 mg/dL  3-5 days..................<15.0 mg/dL  6-29 days.................<15.0 mg/dL           BUN 4         Calcium 9.3         Chloride 105         CO2 24         Creatinine 0.6         Differential  Method Automated         eGFR >60         Eos # 0.3         Eosinophil % 4.3         Glucose 81         Gran # (ANC) 4.6         Gran % 64.4         Group & Rh   O NEG       Hematocrit 28.2         Hemoglobin 9.2         Immature Grans (Abs) 0.03  Comment: Mild elevation in immature granulocytes is non specific and   can be seen in a variety of conditions including stress response,   acute inflammation, trauma and pregnancy. Correlation with other   laboratory and clinical findings is essential.           Immature Granulocytes 0.4         INDIRECT WILLIAM   NEG         Comment: Results are increased in hemolyzed samples.         Lymph # 1.7         Lymph % 23.2         MCH 27.1         MCHC 32.6         MCV 83         Mono # 0.5         Mono % 7.3         MPV 11.1         nRBC 0         Platelets 227         Potassium 3.9         PROTEIN TOTAL 6.5         RBC 3.40         RDW 16.3         [place] [facility] (Fetal Bleed Screen)   NEG       Sodium 137         Uric Acid 4.7         WBC 7.15                 Physical Exam:   Constitutional: She is oriented to person, place, and time. She appears well-developed and well-nourished.    HENT:   Head: Normocephalic and atraumatic.    Eyes: Pupils are equal, round, and reactive to light. EOM are normal.     Cardiovascular:       Exam reveals no clubbing and no cyanosis.        Pulmonary/Chest: Effort normal.        Abdominal: Soft. She exhibits no mass. There is no rebound and no guarding. No hernia.             Musculoskeletal: Normal range of motion and moves all extremeties.       Neurological: She is alert and oriented to person, place, and time.    Skin: Skin is warm. No cyanosis. Nails show no clubbing.    Psychiatric: She has a normal mood and affect. Her behavior is normal. Thought content normal.     Review of Systems   Constitutional:  Negative for appetite change, chills and fever.   Respiratory:  Negative for shortness of breath.    Cardiovascular:  Negative for  chest pain.   Gastrointestinal:  Negative for abdominal pain, blood in stool, constipation, diarrhea, nausea and vomiting.   Endocrine: Negative for hair loss and hot flashes.   Genitourinary:  Negative for decreased libido, dysmenorrhea, dyspareunia, dysuria, genital sores, menorrhagia, menstrual problem, pelvic pain, vaginal discharge, vaginal pain, urinary incontinence and vaginal odor.   Musculoskeletal:  Negative for back pain.   Integumentary:  Negative for rash, acne, hair changes, breast mass, nipple discharge and breast skin changes.   Breast: Negative for mass, mastodynia, nipple discharge and skin changes

## 2023-02-05 NOTE — NURSING
3 progressive BP's: 160/70, 150/89, 155/80. MD Tovar to start Procardia 30 mg XL once daily to start now.

## 2023-02-05 NOTE — PLAN OF CARE
Problem: Adult Inpatient Plan of Care  Goal: Plan of Care Review  Outcome: Ongoing, Progressing  Goal: Patient-Specific Goal (Individualized)  Outcome: Ongoing, Progressing  Goal: Absence of Hospital-Acquired Illness or Injury  Outcome: Ongoing, Progressing  Goal: Optimal Comfort and Wellbeing  Outcome: Ongoing, Progressing  Goal: Readiness for Transition of Care  Outcome: Ongoing, Progressing     Problem: Bariatric Environmental Safety  Goal: Safety Maintained with Care  Outcome: Ongoing, Progressing     Problem:  Fall Injury Risk  Goal: Absence of Fall, Infant Drop and Related Injury  Outcome: Ongoing, Progressing     Problem: Infection  Goal: Absence of Infection Signs and Symptoms  Outcome: Ongoing, Progressing     Problem: Adjustment to Role Transition (Postpartum Vaginal Delivery)  Goal: Successful Maternal Role Transition  Outcome: Ongoing, Progressing     Problem: Bleeding (Postpartum Vaginal Delivery)  Goal: Hemostasis  Outcome: Ongoing, Progressing     Problem: Infection (Postpartum Vaginal Delivery)  Goal: Absence of Infection Signs/Symptoms  Outcome: Ongoing, Progressing     Problem: Pain (Postpartum Vaginal Delivery)  Goal: Acceptable Pain Control  Outcome: Ongoing, Progressing     Problem: Urinary Retention (Postpartum Vaginal Delivery)  Goal: Effective Urinary Elimination  Outcome: Met

## 2023-02-06 VITALS
HEART RATE: 101 BPM | WEIGHT: 270.63 LBS | OXYGEN SATURATION: 97 % | RESPIRATION RATE: 18 BRPM | BODY MASS INDEX: 42.48 KG/M2 | HEIGHT: 67 IN | DIASTOLIC BLOOD PRESSURE: 86 MMHG | TEMPERATURE: 98 F | SYSTOLIC BLOOD PRESSURE: 137 MMHG

## 2023-02-06 PROCEDURE — 25000003 PHARM REV CODE 250: Performed by: OBSTETRICS & GYNECOLOGY

## 2023-02-06 PROCEDURE — 99238 HOSP IP/OBS DSCHRG MGMT 30/<: CPT | Mod: ,,, | Performed by: OBSTETRICS & GYNECOLOGY

## 2023-02-06 PROCEDURE — 99238 PR HOSPITAL DISCHARGE DAY,<30 MIN: ICD-10-PCS | Mod: ,,, | Performed by: OBSTETRICS & GYNECOLOGY

## 2023-02-06 RX ORDER — HYDROCODONE BITARTRATE AND ACETAMINOPHEN 5; 325 MG/1; MG/1
1 TABLET ORAL EVERY 8 HOURS PRN
Qty: 30 TABLET | Refills: 0 | Status: SHIPPED | OUTPATIENT
Start: 2023-02-06 | End: 2023-02-16

## 2023-02-06 RX ORDER — NIFEDIPINE 30 MG/1
30 TABLET, EXTENDED RELEASE ORAL DAILY
Qty: 30 TABLET | Refills: 11 | Status: SHIPPED | OUTPATIENT
Start: 2023-02-07 | End: 2024-02-07

## 2023-02-06 RX ORDER — IBUPROFEN 800 MG/1
800 TABLET ORAL EVERY 8 HOURS PRN
Qty: 40 TABLET | Refills: 0 | Status: SHIPPED | OUTPATIENT
Start: 2023-02-06

## 2023-02-06 RX ADMIN — HYDROCODONE BITARTRATE AND ACETAMINOPHEN 1 TABLET: 10; 325 TABLET ORAL at 06:02

## 2023-02-06 RX ADMIN — NIFEDIPINE 30 MG: 30 TABLET, FILM COATED, EXTENDED RELEASE ORAL at 08:02

## 2023-02-06 RX ADMIN — IBUPROFEN 600 MG: 600 TABLET ORAL at 12:02

## 2023-02-06 RX ADMIN — HYDROCODONE BITARTRATE AND ACETAMINOPHEN 1 TABLET: 10; 325 TABLET ORAL at 12:02

## 2023-02-06 RX ADMIN — MUPIROCIN: 20 OINTMENT TOPICAL at 08:02

## 2023-02-06 RX ADMIN — HYDROCORTISONE: 25 CREAM TOPICAL at 12:02

## 2023-02-06 RX ADMIN — HYDROCODONE BITARTRATE AND ACETAMINOPHEN 1 TABLET: 5; 325 TABLET ORAL at 02:02

## 2023-02-06 RX ADMIN — IBUPROFEN 600 MG: 600 TABLET ORAL at 06:02

## 2023-02-06 NOTE — SUBJECTIVE & OBJECTIVE
Interval History:   She is doing well this morning. She is tolerating a regular diet without nausea or vomiting. She is voiding spontaneously. She is ambulating. She has passed flatus, and has not a BM. Vaginal bleeding is mild. She denies fever or chills. Abdominal pain is mild and controlled with oral medications. She Is breastfeeding. She desires circumcision for her male baby: not applicable.    Objective:     Vital Signs (Most Recent):  Temp: 98.3 °F (36.8 °C) (02/06/23 1237)  Pulse: 101 (02/06/23 1237)  Resp: 18 (02/06/23 1237)  BP: 137/86 (02/06/23 1237)  SpO2: 97 % (02/06/23 0823) Vital Signs (24h Range):  Temp:  [98.1 °F (36.7 °C)-98.4 °F (36.9 °C)] 98.3 °F (36.8 °C)  Pulse:  [] 101  Resp:  [18-20] 18  SpO2:  [96 %-97 %] 97 %  BP: (130-160)/(70-89) 137/86     Weight: 122.7 kg (270 lb 9.8 oz)  Body mass index is 42.37 kg/m².    No intake or output data in the 24 hours ending 02/06/23 1303      Significant Labs:  Lab Results   Component Value Date    GROUPTRH O NEG 02/04/2023    HEPBSAG Non-reactive 11/08/2022    STREPBCULT No Group B Streptococcus isolated 01/11/2023     Recent Labs   Lab 02/05/23  0659   HGB 9.2*   HCT 28.2*       CBC:   Recent Labs   Lab 02/05/23  0659   WBC 7.15   RBC 3.40*   HGB 9.2*   HCT 28.2*      MCV 83   MCH 27.1   MCHC 32.6     I have personallly reviewed all pertinent lab results from the last 24 hours.    Physical Exam:   Constitutional: She is oriented to person, place, and time. She appears well-developed and well-nourished. No distress.       Cardiovascular:  Normal rate.             Pulmonary/Chest: Effort normal.        Abdominal: Soft. She exhibits abdominal incision. She exhibits no distension.                 Neurological: She is alert and oriented to person, place, and time.    Skin: Skin is warm and dry.    Psychiatric: She has a normal mood and affect.     Review of Systems   Constitutional:  Negative for chills and fever.   Eyes:  Negative for visual  disturbance.   Respiratory:  Negative for cough and wheezing.    Cardiovascular:  Negative for chest pain and palpitations.   Gastrointestinal:  Negative for abdominal pain, nausea and vomiting.   Genitourinary:  Negative for dysuria, frequency, hematuria, pelvic pain, vaginal bleeding, vaginal discharge and vaginal pain.   Neurological:  Negative for headaches.   Psychiatric/Behavioral:  Negative for depression.

## 2023-02-06 NOTE — DISCHARGE SUMMARY
[facility]  Obstetrics  Discharge Summary      Patient Name: Ole Allen  MRN: [id number]  Admission Date: 2/3/2023  Hospital Length of Stay: 3 days  Discharge Date and Time:  2023 1:06 PM  Attending Physician: Avila Herman MD   Discharging Provider: Avila Herman MD   Primary Care Provider: Umesh He MD    HPI:  Ole Allen is a 33 y.o.  female with IUP at 39w3d weeks gestation who is admitted for scheduled  Section secondary to history of C/S x2.     This IUP is complicated by history of C/S X2, AMA, history of HSV (on valtrex), Rh negative.  Patient denies contractions, denies vaginal bleeding, denies LOF.   Fetal Movement: normal.             Procedure(s) (LRB):   SECTION (N/A)     Hospital Course:   2/3/23 s/p R LTCD  23, POD # 1, doing well, passing flatus, breastfeeding, mild range BP x 2 last night, now normotensive  23, POD #2, intermittent mild range BP continue, denies preE symptoms, passing flatus- feeling constipated  2023: POD#3 s/p RLTCS. Patient is ambulating, voiding and tolerating PO. She had elevated BPs and was started on Procardia. Blood pressures have now improved. She is doing well. Pain controlled on PO medication.           Final Active Diagnoses:    Diagnosis Date Noted POA    PRINCIPAL PROBLEM:  Status post  section [Z98.891] 2023 Not Applicable    Gestational hypertension, antepartum [O13.9] 10/21/2021 No      Problems Resolved During this Admission:    Diagnosis Date Noted Date Resolved POA    History of  section [Z98.891] 11/15/2022 2023 Not Applicable        Significant Diagnostic Studies: Labs:   CBC   Recent Labs   Lab 23  0659   WBC 7.15   HGB 9.2*   HCT 28.2*            Feeding Method: breast    Immunizations     Date Immunization Status Dose Route/Site Given by    23 0057 Rho (D) Immune Globulin - IM Given 300 mcg  Intramuscular/Left Ventrogluteal Jackie Bishop RN          Delivery:    Episiotomy:     Lacerations:     Repair suture:     Repair # of packets:     Blood loss (ml):       Birth information:  YOB: 2025   Time of birth: 12:01 PM   Sex: female   Delivery type: , Low Transverse   Gestational Age: 39w3d    Delivery Clinician:      Other providers:       Additional  information:  Forceps:    Vacuum:    Breech:    Observed anomalies      Living?:           APGARS  One minute Five minutes Ten minutes   Skin color:         Heart rate:         Grimace:         Muscle tone:         Breathing:         Totals: 5  9        Placenta: Delivered:       appearance    Pending Diagnostic Studies:     None          Discharged Condition: good    Disposition: Home or Self Care    Follow Up:   Follow-up Information     Avila Herman MD Follow up in 1 week(s).    Specialty: Obstetrics and Gynecology  Why: For wound re-check, post-op check, blood pressure check  Contact information:  [address]  [address]  [place] [id number] [phone]             Avila Herman MD Follow up in 6 week(s).    Specialty: Obstetrics and Gynecology  Why: post partum  Contact information:  [address] [facility]  [address]  [place] [id number] [phone]                       Patient Instructions:      BREAST PUMP FOR HOME USE     Order Specific Question Answer Comments   Type of pump: Electric    Weight: 122.7 kg (270 lb 9.8 oz)    Length of need (1-99 months): 99      Diet Adult Regular     Lifting restrictions     Pelvic Rest     Notify your health care provider if you experience any of the following:  temperature >100.4     Notify your health care provider if you experience any of the following:  persistent nausea and vomiting or diarrhea     Notify your health care provider if you experience any of the following:  severe uncontrolled pain     Notify your health care provider if you experience any of the following:   redness, tenderness, or signs of infection (pain, swelling, redness, odor or green/yellow discharge around incision site)     Notify your health care provider if you experience any of the following:  difficulty breathing or increased cough     Notify your health care provider if you experience any of the following:  severe persistent headache     Notify your health care provider if you experience any of the following:  worsening rash     Notify your health care provider if you experience any of the following:  persistent dizziness, light-headedness, or visual disturbances     Notify your health care provider if you experience any of the following:  increased confusion or weakness     No dressing needed     Activity as tolerated     Weight bearing restrictions (specify):     Medications:  Current Discharge Medication List      START taking these medications    Details   HYDROcodone-acetaminophen (NORCO) 5-325 mg per tablet Take 1 tablet by mouth every 8 (eight) hours as needed for Pain.  Qty: 30 tablet, Refills: 0    Comments: Quantity prescribed more than 7 day supply? No  Associated Diagnoses: Status post  section      ibuprofen (ADVIL,MOTRIN) 800 MG tablet Take 1 tablet (800 mg total) by mouth every 8 (eight) hours as needed for Pain.  Qty: 40 tablet, Refills: 0    Associated Diagnoses: Status post  section      NIFEdipine (PROCARDIA-XL) 30 MG (OSM) 24 hr tablet Take 1 tablet (30 mg total) by mouth once daily.  Qty: 30 tablet, Refills: 11    Comments: .  Associated Diagnoses: Gestational hypertension, antepartum         CONTINUE these medications which have NOT CHANGED    Details   albuterol (PROVENTIL/VENTOLIN HFA) 90 mcg/actuation inhaler Inhale 2 puffs into the lungs every 6 (six) hours as needed for Wheezing. Rescue  Qty: 6.7 g, Refills: 0    Associated Diagnoses: Asthma affecting pregnancy in third trimester      albuterol sulfate 2.5 mg/0.5 mL Nebu Take 2.5 mg by nebulization every 4 (four)  hours as needed (asthma exacerbation).  Qty: 10 each, Refills: 1    Associated Diagnoses: Asthma during pregnancy         STOP taking these medications       aspirin (ECOTRIN) 81 MG EC tablet Comments:   Reason for Stopping:         ferrous sulfate 325 (65 FE) MG EC tablet Comments:   Reason for Stopping:         fluconazole (DIFLUCAN) 150 MG Tab Comments:   Reason for Stopping:         PNV,calcium 72-iron-folic acid (PRENATAL VITAMIN PLUS LOW IRON) 27 mg iron- 1 mg Tab Comments:   Reason for Stopping:         valACYclovir (VALTREX) 1000 MG tablet Comments:   Reason for Stopping:               Avila Herman MD  Obstetrics  [facility]

## 2023-02-06 NOTE — PROGRESS NOTES
[facility]  Obstetrics  Postpartum Progress Note    Patient Name: Ole Allen  MRN: [id number]  Admission Date: 2/3/2023  Hospital Length of Stay: 3 days  Attending Physician: Avila Herman MD  Primary Care Provider: Umesh He MD    Subjective:     Principal Problem:Status post  section    Hospital Course:  2/3/23 s/p R LTCD  23, POD # 1, doing well, passing flatus, breastfeeding, mild range BP x 2 last night, now normotensive  23, POD #2, intermittent mild range BP continue, denies preE symptoms, passing flatus- feeling constipated  2023: POD#3 s/p RLTCS. Patient is ambulating, voiding and tolerating PO. She had elevated BPs and was started on Procardia. Blood pressures have now improved. She is doing well. Pain controlled on PO medication.      Interval History:   She is doing well this morning. She is tolerating a regular diet without nausea or vomiting. She is voiding spontaneously. She is ambulating. She has passed flatus, and has not a BM. Vaginal bleeding is mild. She denies fever or chills. Abdominal pain is mild and controlled with oral medications. She Is breastfeeding. She desires circumcision for her male baby: not applicable.    Objective:     Vital Signs (Most Recent):  Temp: 98.3 °F (36.8 °C) (23 1237)  Pulse: 101 (23 1237)  Resp: 18 (23 1237)  BP: 137/86 (23 1237)  SpO2: 97 % (23 0823) Vital Signs (24h Range):  Temp:  [98.1 °F (36.7 °C)-98.4 °F (36.9 °C)] 98.3 °F (36.8 °C)  Pulse:  [] 101  Resp:  [18-20] 18  SpO2:  [96 %-97 %] 97 %  BP: (130-160)/(70-89) 137/86     Weight: 122.7 kg (270 lb 9.8 oz)  Body mass index is 42.37 kg/m².    No intake or output data in the 24 hours ending 23 1303      Significant Labs:  Lab Results   Component Value Date    GROUPTRH O NEG 2023    HEPBSAG Non-reactive 2022    STREPBCULT No Group B Streptococcus isolated 2023     Recent Labs   Lab  23  0659   HGB 9.2*   HCT 28.2*       CBC:   Recent Labs   Lab 23  0659   WBC 7.15   RBC 3.40*   HGB 9.2*   HCT 28.2*      MCV 83   MCH 27.1   MCHC 32.6     I have personallly reviewed all pertinent lab results from the last 24 hours.    Physical Exam:   Constitutional: She is oriented to person, place, and time. She appears well-developed and well-nourished. No distress.       Cardiovascular:  Normal rate.             Pulmonary/Chest: Effort normal.        Abdominal: Soft. She exhibits abdominal incision. She exhibits no distension.                 Neurological: She is alert and oriented to person, place, and time.    Skin: Skin is warm and dry.    Psychiatric: She has a normal mood and affect.     Review of Systems   Constitutional:  Negative for chills and fever.   Eyes:  Negative for visual disturbance.   Respiratory:  Negative for cough and wheezing.    Cardiovascular:  Negative for chest pain and palpitations.   Gastrointestinal:  Negative for abdominal pain, nausea and vomiting.   Genitourinary:  Negative for dysuria, frequency, hematuria, pelvic pain, vaginal bleeding, vaginal discharge and vaginal pain.   Neurological:  Negative for headaches.   Psychiatric/Behavioral:  Negative for depression.      Assessment/Plan:     33 y.o. female  for:    * Status post  section  - Patient doing well. Continue routine management and advances.  - Continue PO pain meds. Pain well controlled.  - Pre H/H 9.2/28.2 --> post H/H 9.1/27.5; VSS, asymptomatic  - Encourage ambulation  - Contraception will discuss at postpartum  - Lactation breast feeding      Gestational hypertension, antepartum  - BP: (130-160)/(70-89) 137/86  - Currently on Procardia 30XL            Disposition: As patient meets milestones, will plan to discharge TODAY.    Avila Herman MD  Obstetrics  [facility] [place] [facility]

## 2023-02-06 NOTE — ASSESSMENT & PLAN NOTE
- Patient doing well. Continue routine management and advances.  - Continue PO pain meds. Pain well controlled.  - Pre H/H 9.2/28.2 --> post H/H 9.1/27.5; VSS, asymptomatic  - Encourage ambulation  - Contraception will discuss at postpartum  - Lactation breast feeding

## 2023-02-06 NOTE — DISCHARGE INSTRUCTIONS
After a Cesearean Birth    General Discharge Instructions  May follow a regular diet, unless otherwise discussed with physician.  Take showers, not baths unless otherwise discussed with physician.  Activity as tolerated.  No lifting or heavy exercise for 6 weeks, no driving for 2 weeks, no sexual intercourse, douching or tampons for 6 weeks  May return to work/school as discussed with physician  Discuss birth control with physician  Breast care support bra worn at all times  Lactation consultant referral number ( [phone] or [phone])    Call Your Healthcare Provider Right Away If You Have:  A temperature of 100.4°F or higher.  If your blood pressure is over 155/105.  You have difficulty catching your breath or trouble breathing.  Heavy vaginal bleeding, clots, or vaginal discharge with foul odor. (heavier than menses)  Persistent nausea or vomiting.  You gain more than 3 pounds in 3 days.  Severe headaches not relieved by Tylenol (acetaminophen) or Motrin (ibuprofen)  Blurry or double vision, see spots or flashing lights.  Dizziness or fainting.  New onset swelling or worsening of existing swelling.  Burning or pain when you urinate.  No bowel movement for 5 days.  Redness, warmth, swelling, or pain in the lower leg.  Redness, discharge, or pain worse than you had in the hospital.  Burning, pain, red streaks, or lumpy areas in your breasts.  Cracks, blisters, or blood on your nipples.  Feelings of extreme sadness or anxiety, or a feeling that you dont want to be with your baby.  If you have any new or unusual symptoms or have questions or concerns    Incision Care  You will be able to shower and pat the incision dry.  Watch your incision for signs of infection, such as increasing redness or drainage.  For ease of movement, hold a pillow against the incision when you get up from a lying or sitting position, and when you laugh or cough.  Avoid heavy lifting--nothing heavier than your baby until your doctor  instructs you otherwise.     Follow-Up  Schedule a  follow-up exam with your healthcare provider for about 6 weeks after delivery. During this exam, your uterus and vaginal area will be checked. Contact your healthcare provider if you think you or your baby are having any problems.                 Breastfeeding discharge instructions given with First Alert form and reviewed. Please complete First Alert within 3-5 days after the baby's birth. ( Please call the Breastfeeding Warmline ( [phone]) or the baby's pediatrician if you have any concerns.     Also discussed:  AAP recommendation of exclusive breastfeeding for the first 6 months of life and continued breastfeeding with the introduction of supplemental foods beyond the first year of life. The AAP recommends breastfeeding for for at least a year or longer. Instructed on the recommendation to delay all bottle and pacifier use until after 4 weeks of age and breastfeeding is well established.  Discussed the benefits of exclusive breastfeeding for both mother and baby.  Discussed the risks of supplementation/pacifier use on the exclusivity of breastfeeding in the first 6 months. Feed the baby at the earliest sign of hunger or comfort  Hands to mouth, sucking motions  Rooting or searching for something to suck on  Dont wait for crying - it is a not a late sign of hunger; it is a sign of distress    The feedings may be 8-12 times per 24hrs and will not follow a schedule  Alternate the breast you start the feeding with, or start with the breast that feels the fullest  Switch breasts when the baby takes himself off the breast or falls asleep  Keep offering breasts until the baby looks full, no longer gives hunger signs, and stays asleep when placed on his back in the crib  If the baby is sleepy and wont wake for a feeding, put the baby skin-to-skin dressed in a diaper against the mothers bare chest  Sleep near your baby  The baby should be  positioned and latched on to the breast correctly  Chest-to-chest, chin in the breast  Babys lips are flipped outward  Babys mouth is stretched open wide like a shout  Babys sucking should feel like tugging to the mother  The baby should be drinking at the breast:  You should hear swallowing or gulping throughout the feeding  You should see milk on the babys lips when he comes off the breast  Your breasts should be softer when the baby is finished feeding  The baby should look relaxed at the end of feedings  After the 4th day and your milk is in:  The babys poop should turn bright yellow and be loose, watery, and seedy  The baby should have at least 3-4 poops the size of the palm of your hand per day  The baby should have at least 6-8 wet diapers per day  The urine should be light yellow in color  You should drink when you are thirsty and eat a healthy diet when you are    hungry.     Take naps to get the rest you need.   Take medications and/or drink alcohol only with permission of your obstetrician    or the babys pediatrician.  You can also call the [facility],   ([phone]), Monday-Friday, 8am-5pm Central time, to get the most   up-to-date evidence-based information on the use of medications during   pregnancy and breastfeeding.      The baby should be examined by a pediatrician at 3-5 days of age and again at 2 weeks of age unless ordered sooner by the pediatrician.  Once your milk production increases the baby should gain at least 1/2-1oz each day and should be back to birth weight no later than 10-14 days of age.If this is not the case, please call the Breastfeeding Warmline ( [phone]) for assistance and support. Instructed on primary engorgement and precautions.  Discussed:    Typical timing of the onset of engorgement  Signs and symptoms of engorgement  If the milk is flowing, use wet or dry heat applied to the breasts for approximately 10min prior to each feeding as a comfort  measure to facilitate the milk ejection reflex    Follow heat treatment with breast massage to soften hard/lumpy areas of the breast    Use unrestricted, frequent, effective feedings    Wake baby to feed if necessary    Avoid pacifier and bottle feedings    Hand express or pump breasts to the point of comfort as needed    Use cold treatments in the form of ice packs/gel packs/ frozen vegetables wrapped in a soft thin cloth and applied to the breasts for approximately 20min after each feeding until engorgement is resolved    Wear comfortable, supportive bra    Take pain medicine as needed    Use anti-inflammatory medications if prescribed by physician Preparation and Hygiene:  Shower daily.  Wear a clean bra each day and wash daily in warm soapy water.  Change wet or moist breast pads frequently.  Moist pads can promote growth of germs.  Actively wash your hands, paying close attention to the area around and under your fingernails, thoroughly with soap and water for 15 seconds before pumping or handling your milk.  Re-wash your hands if you touch anything (scratching your nose, answering the phone, etc) while pumping or handling your milk.   Before pumping your breasts, assemble the pump collection kit and have ready the sterile container and labels.  Place these items on a clean surface next to the breast pump.  Each time after you have finished pumping, take apart all of the parts of the breast pump collection kit and place them in a separate cleaning container (do not place them in the sink).  Be sure to remove the yellow valve from the breast shield and separate the white membrane from the yellow valve.  Rinse all of these parts with cool water.  Then use a new sponge and/or bottle brush and dishwashing detergent to clean the parts.  Rinse off the soapy water with cool water and air dry on a clean towel covered with a clean cloth.  All parts may also be washed after each use in the top rack of a .  Once  each day, sanitize all of the parts of the breast pump collection kit.  This can be done by boiling the kit parts for 10 minutes or by using a Quick Clean Micro-Steam Bag made by [employer]  If condensation appears in the tubing, continue to run the pump with the tubing attached for 1-2 minutes or until the tubing is dry.   Notify your babys nurse or doctor if you become ill or need to take any medication, even over-the-counter medicines.  Collection and Storage of Expressed Breast milk:         1. Pump your breasts at least 8 or more times every 24 hours.  Double pump (both breasts at the same time) for at least 15-20 minutes using the most suction that is comfortable.    2. Write the date and time of pumping and the name of any medications you are taking on the babys pre-printed hospital identification label.   3.    Do not touch the inside of the storage containers or lids.  4.        Tightly screw the lid onto the container and place immediately into the refrigerator for daily use.  5.    Expressed breast milk should be refrigerated or frozen within 4 hours of pumping.  6.        Do not store expressed breast milk on the door of your refrigerator or freezer             where the temperature is warmer.   7.        Refrigerated milk may be stored for up to 7 days.  At this point it can be moved to the freezer for 6 -12 months.  8.        Thaw frozen breast milk in overnight in the refrigerator.  Once milk is thawed it must be used within 24 hours.  9.        Refrigerated breast milk needs to be warmed to room temperature.  Warm by leaving unrefrigerated until it reached room temperature, or place sealed bottle into a cup of warm (not boiling) water or use a bottle warmer.               Never warm breast milk in a microwave or boiling water.    For any questions or concerns call the Lactation Department at [phone]

## 2023-02-06 NOTE — PLAN OF CARE
VSS. NAD. Ambulating and voiding. Pain well controlled with prn pain meds. Breastfeeding independently. Discussed POC, pain management, lactation, and incision care. Pt verbalizes understanding.

## 2023-02-06 NOTE — LACTATION NOTE
02/06/23 0855   Maternal Assessment   Breast Density Bilateral:;soft   Areola Bilateral:;elastic   Nipples Bilateral:;everted   Maternal Infant Feeding   Maternal Emotional State independent;relaxed   Infant Positioning cradle   Signs of Milk Transfer audible swallow;infant jaw motion present;breasts soften with feeding   Pain with Feeding no   Latch Assistance yes   Equipment Type   Breast Pump Type double electric, hospital grade   Breast Pump Flange Type hard   Breast Pump Flange Size 24 mm   Breast Pumping   Breast Pumping Interventions post-feed pumping encouraged   Breast Pumping bilateral breasts pumped until soft;double electric breast pump utilized;pre-pumping breast massage   Community Referrals   Community Referrals outpatient lactation program;pediatric care provider;WIC (women, infants and children) program     Mother breastfeeding independently-mother denies discomfort with feeding -states baby latching well -pumping to build supply to be able to supplement 1 year old at home -states supply had dropped during pregnancy but wants to continue providing breast milk for other child -has 11/2 ounces collected at bedside -baby latches easily now for strong sucking and swallows -mother states ready for discharge today -review breastfeeding  discharge information -aware to monitor wet and dirty diapers over the next few days -referred to breastfeeding guide for community resources -RX  for personal pump given -all questions answered and states understanding

## 2023-02-07 ENCOUNTER — PATIENT MESSAGE (OUTPATIENT)
Dept: OBSTETRICS AND GYNECOLOGY | Facility: HOSPITAL | Age: 33
End: 2023-02-07
Payer: MEDICAID

## 2023-02-07 NOTE — PROGRESS NOTES
Patient discharged per order. VS stable with no signs of distress. Discharge paperwork printed and reviewed with patient. Reviewed urgent maternal warning signs and instructed to go to ER or call physician if symptoms occur. Reviewed signs and symptoms of depression and anxiety and provided relevant handouts. Reviewed community resources available. Instructed on follow-up appointment with physician. Patient voiced understanding of all.

## 2023-02-08 ENCOUNTER — TELEPHONE (OUTPATIENT)
Dept: OBSTETRICS AND GYNECOLOGY | Facility: HOSPITAL | Age: 33
End: 2023-02-08
Payer: MEDICAID

## 2023-02-08 NOTE — TELEPHONE ENCOUNTER
"Spoke to pt who states baby feeding well and having plenty of wet and dirty diapers-stools are yellow and watery now -breasts are full -baby softens but needs to feed for a "long time" to soften well -her old pump not working as well as she would like but will be getting a new pump to be able to empty and store milk -has no other questions or concerns for us   "

## 2023-02-09 ENCOUNTER — HOSPITAL ENCOUNTER (EMERGENCY)
Facility: HOSPITAL | Age: 33
Discharge: HOME | End: 2023-02-09
Attending: EMERGENCY MEDICINE
Payer: MEDICAID

## 2023-02-09 ENCOUNTER — TELEPHONE (OUTPATIENT)
Dept: OBSTETRICS AND GYNECOLOGY | Facility: CLINIC | Age: 33
End: 2023-02-09
Payer: MEDICAID

## 2023-02-09 VITALS
HEIGHT: 67 IN | SYSTOLIC BLOOD PRESSURE: 124 MMHG | RESPIRATION RATE: 18 BRPM | BODY MASS INDEX: 41.59 KG/M2 | DIASTOLIC BLOOD PRESSURE: 72 MMHG | TEMPERATURE: 98 F | HEART RATE: 82 BPM | OXYGEN SATURATION: 96 % | WEIGHT: 265 LBS

## 2023-02-09 DIAGNOSIS — Z51.89 VISIT FOR WOUND CHECK: Primary | ICD-10-CM

## 2023-02-09 PROCEDURE — 99281 EMR DPT VST MAYX REQ PHY/QHP: CPT

## 2023-02-09 NOTE — TELEPHONE ENCOUNTER
Spoke with patient and rescommended that she goes to her nearest ER to get evaluated. Scheduled follow up appointment on 02/10 at 10am with NP-Qasim Mendez. VU.      ----- Message from Martha Snell sent at 2/9/2023  8:03 AM CST -----  Regarding: self .[phone]  .Type: Patient Call Back    Who called: self    What is the request in detail: PT stated that her wound is leaking and feels that she probably has some water in the wound and would like to come in asap to have it checked     Can the clinic reply by [facility]? Call back    Would the patient rather a call back or a response via [facility]? Call back    Best call back number: .[phone]

## 2023-02-09 NOTE — ED PROVIDER NOTES
Encounter Date: 2023       History     Chief Complaint   Patient presents with    Post-op Problem     Pt states that she had a C section done on Friday and has leaking from incision site.      33-year-old female with no past medical history presents to ED for emergent evaluation of a postop problem.  She states she had a  with her OBGYN, Dr. Petersen on 2023.  She states that she is been noticing some drainage from the wound yesterday.  She is an appointment with Dr. Petersen tomorrow.  She denies any fever, chills, chest pain, shortness of breath, abdominal pain, nausea, vomiting, diarrhea, dysuria, hematuria.  She reports history of 2 C sections in the past.  No other symptoms reported.    The history is provided by the patient. No  was used.   Review of patient's allergies indicates:  No Known Allergies  Past Medical History:   Diagnosis Date    Asthma     Hemoglobin S trait     Herpes      Past Surgical History:   Procedure Laterality Date    ANTERIOR CRUCIATE LIGAMENT REPAIR       SECTION       SECTION N/A 10/22/2021    Procedure:  SECTION;  Surgeon: Julie R. Jeansonne, MD;  Location: [facility] L&D;  Service: OB/GYN;  Laterality: N/A;     SECTION N/A 10/22/2021    Procedure:  SECTION;  Surgeon: Sissy Jean MD;  Location: [facility] L&D;  Service: OB/GYN;  Laterality: N/A;     SECTION N/A 2/3/2023    Procedure:  SECTION;  Surgeon: Avila Herman MD;  Location: [facility] L&D OR;  Service: OB/GYN;  Laterality: N/A;    DILATION AND CURETTAGE OF UTERUS N/A     DILATION AND CURETTAGE OF UTERUS USING SUCTION N/A      Family History   Adopted: Yes   Problem Relation Age of Onset    Miscarriages / Stillbirths Neg Hx     Cancer Neg Hx      Social History     Tobacco Use    Smoking status: Never    Smokeless tobacco: Never   Substance Use Topics    Alcohol use: Not Currently     Comment: occ    Drug use: No     Review of  Systems   Constitutional:  Negative for chills and fever.   HENT:  Negative for congestion, ear pain, rhinorrhea and sore throat.    Eyes:  Negative for redness.   Respiratory:  Negative for cough and shortness of breath.    Cardiovascular:  Negative for chest pain.   Gastrointestinal:  Negative for abdominal pain, diarrhea, nausea and vomiting.   Genitourinary:  Negative for decreased urine volume, difficulty urinating, dysuria, frequency, hematuria and urgency.   Musculoskeletal:  Negative for back pain and neck pain.   Skin:  Positive for wound. Negative for rash.   Neurological:  Negative for headaches.   Psychiatric/Behavioral:  Negative for confusion.      Physical Exam     Initial Vitals [02/09/23 1110]   BP Pulse Resp Temp SpO2   (!) 148/84 99 18 98.2 °F (36.8 °C) 98 %      MAP       --         Physical Exam    Nursing note and vitals reviewed.  Constitutional: She appears well-developed and well-nourished.  Non-toxic appearance. She does not appear ill.   HENT:   Head: Normocephalic and atraumatic.   Mouth/Throat: Mucous membranes are normal.   Eyes: Conjunctivae and EOM are normal.   Neck: Neck supple.   Normal range of motion.   Full passive range of motion without pain.     Cardiovascular:  Normal rate and regular rhythm.           Pulses:       Radial pulses are 2+ on the right side and 2+ on the left side.   Pulmonary/Chest: Effort normal and breath sounds normal. No respiratory distress.   Abdominal: Abdomen is soft. Bowel sounds are normal. She exhibits no distension. There is no abdominal tenderness.   Linear healing incision wound to lower abdomen.  Minor serous drainage.  No purulence or serosanguineous purulence.  Small area of dehiscence.  No gaping wounds.  No surrounding fluctuance or masses. There is no rebound and no guarding.   Musculoskeletal:         General: Normal range of motion.      Cervical back: Full passive range of motion without pain, normal range of motion and neck supple. No  rigidity.     Neurological: She is alert.   Skin: Skin is warm and dry.   Psychiatric: She has a normal mood and affect.       ED Course   Procedures  Labs Reviewed - No data to display       Imaging Results    None          Medications - No data to display  Medical Decision Making:   ED Management:  This is a 33-year-old female with no past medical history presents to ED for emergent evaluation of a postop problem.  She states she had a  with her OBGYN, Dr. Petersen on 2023.  On physical exam, patient is well-appearing and in no acute distress.  Nontoxic appearing.  Lungs are clear to auscultation bilaterally.  Abdomen is soft and nontender.  No guarding, rigidity, rebound.  2+ radial pulses bilaterally.  Posterior oropharynx is not erythematous.  No edema or exudate.  Uvula midline.  Bilateral tympanic membrane is normal.  No erythema, bulging, or perforations.  Neuro intact.  Strength and sensation intact bilateral upper and lower extremities. Linear incision healing wound to lower abdomen.  Minor serous drainage.  No purulence or serosanguineous purulence.  Small area of dehiscence.  No gaping wounds.  No surrounding fluctuance or masses.  Doubt infectious process at this time.  Wound was redressed.  Urged prompt follow-up with Dr. Petersen tomorrow.  Urged prompt follow-up with PCP for further evaluation.    Strict return precautions given. I discussed with the patient/family the diagnosis, treatment plan, indications for return to the emergency department, and for expected follow-up. The patient/family verbalized an understanding. The patient/family is asked if there are any questions or concerns. We discuss the case, until all issues are addressed to the patient/family's satisfaction. Patient/family understands and is agreeable to the plan. Patient is stable and ready for discharge.                          Clinical Impression:   Final diagnoses:  [Z51.89] Visit for wound check (Primary)         ED Disposition Condition    Discharge Stable          ED Prescriptions    None       Follow-up Information       Follow up With Specialties Details Why Contact Info    Avila Herman MD Obstetrics and Gynecology In 1 day For wound re-check [address]  [address]  [facility] [id number] [phone]      Umesh Mckeon MD Family Medicine In 2 days for further evaluation [address]  [facility] [address]  [phone]      [facility] - Emergency Dept Emergency Medicine In 2 days If symptoms worsen [address] [facility] [id number] [phone]             Ayala Walker PA-C  02/09/23 0617

## 2023-02-09 NOTE — ED NOTES
Pt presents with dressing to recent csection with copious amount of pus like brown drainage to bandage. Pt denes fever or pain. Incision appears closed.

## 2023-02-09 NOTE — DISCHARGE INSTRUCTIONS
Please return to the Emergency Department for any new or worsening symptoms including: fever, chest pain, shortness of breath, loss of consciousness, dizziness, weakness, or any other concerns.     Please follow up with your Primary Care Provider within in the week. If you do not have one, you may contact the one listed on your discharge paperwork or you may also call the [facility] Appointment Desk at [phone] to schedule an appointment with one.

## 2023-02-10 ENCOUNTER — POSTPARTUM VISIT (OUTPATIENT)
Dept: OBSTETRICS AND GYNECOLOGY | Facility: CLINIC | Age: 33
End: 2023-02-10
Payer: MEDICAID

## 2023-02-10 VITALS — WEIGHT: 270.75 LBS | BODY MASS INDEX: 42.4 KG/M2 | SYSTOLIC BLOOD PRESSURE: 124 MMHG | DIASTOLIC BLOOD PRESSURE: 80 MMHG

## 2023-02-10 DIAGNOSIS — Z48.89 ENCOUNTER FOR POST SURGICAL WOUND CHECK: Primary | ICD-10-CM

## 2023-02-10 PROCEDURE — 99211 OFF/OP EST MAY X REQ PHY/QHP: CPT | Mod: PBBFAC

## 2023-02-10 PROCEDURE — 87077 CULTURE AEROBIC IDENTIFY: CPT | Mod: 59

## 2023-02-10 PROCEDURE — 87186 SC STD MICRODIL/AGAR DIL: CPT | Mod: 59

## 2023-02-10 PROCEDURE — 99213 OFFICE O/P EST LOW 20 MIN: CPT | Mod: S$PBB,,,

## 2023-02-10 PROCEDURE — 99999 PR PBB SHADOW E&M-EST. PATIENT-LVL I: ICD-10-PCS | Mod: PBBFAC,,,

## 2023-02-10 PROCEDURE — 1111F PR DISCHARGE MEDS RECONCILED W/ CURRENT OUTPATIENT MED LIST: ICD-10-PCS | Mod: CPTII,,,

## 2023-02-10 PROCEDURE — 87070 CULTURE OTHR SPECIMN AEROBIC: CPT

## 2023-02-10 PROCEDURE — 99213 PR OFFICE/OUTPT VISIT, EST, LEVL III, 20-29 MIN: ICD-10-PCS | Mod: S$PBB,,,

## 2023-02-10 PROCEDURE — 99999 PR PBB SHADOW E&M-EST. PATIENT-LVL I: CPT | Mod: PBBFAC,,,

## 2023-02-10 PROCEDURE — 1111F DSCHRG MED/CURRENT MED MERGE: CPT | Mod: CPTII,,,

## 2023-02-10 RX ORDER — SULFAMETHOXAZOLE AND TRIMETHOPRIM 400; 80 MG/1; MG/1
1 TABLET ORAL 2 TIMES DAILY
Qty: 20 TABLET | Refills: 0 | Status: SHIPPED | OUTPATIENT
Start: 2023-02-10 | End: 2023-02-20

## 2023-02-10 NOTE — PROGRESS NOTES
Postpartum Visit    CC: No chief complaint on file.      Ole Allen is a 33 y.o. female  is here for a postpartum visit. She is 1 week postpartum following a low cervical transverse  section, of a female infant weighinlb 11oz. The delivery was at 39w 3d.     Pregnancy was complicated by: AMA, HSV.      Reports consistent drainage and vaginal swelling. States she sits in a recliner most of the day. She denies pain, redness, warmth, or fever.     Dr Tommy Munoz called to room to assess.    OB History    Para Term  AB Living   5 3 3   2 3   SAB IAB Ectopic Multiple Live Births     2   0 3      # Outcome Date GA Lbr Zack/2nd Weight Sex Delivery Anes PTL Lv   5 Term 23 39w3d  3.49 kg (7 lb 11.1 oz) F CS-LTranv Spinal N CLARIBEL   4 Term 10/22/21 41w1d  3.459 kg (7 lb 10 oz) M CS-LTranv Spinal N CLARIBEL      Complications: Gestational hypertension without significant proteinuria during pregnancy in third trimester, antepartum   3 Term 06 39w0d  3.487 kg (7 lb 11 oz) M CS-LTranv  N CLARIBEL      Birth Comments: failed IOL, preE      Complications: Preeclampsia, third trimester   2 IAB            1 IAB               Obstetric Comments   Gynhx: reg   H/o genital herpes, chlamydia       ROS:  GENERAL: No fever, chills, fatigability.  VULVAR: No pain, no lesions and no itching.  VAGINAL: No relaxation, no itching, no discharge, no abnormal bleeding and no lesions.  ABDOMEN: No abdominal pain. Denies nausea. Denies vomiting. No diarrhea. No constipation.   Reports Incision has been oozing.  BREAST: Denies pain. No lumps. No discharge.  URINARY: No incontinence, no nocturia, no frequency and no dysuria.  CARDIOVASCULAR: No chest pain. No shortness of breath. No leg cramps.  NEUROLOGICAL: No headaches. No vision changes.    PHYSICAL EXAM:   General appearance - alert, well appearing, and in no distress and oriented to person, place, and time  Mental status - alert, oriented to person,  place, and time, normal mood, behavior, speech, dress, motor activity, and thought processes  Skin - coloration normal for race, good turgor, warm to touch, no rashes  Abdomen - soft, nontender, nondistended, no masses or organomegaly  Pfannenstiel incision: moderate swelling to lower abdomen and pelvic area. Middle of incision each have a 1-2 centimeter area open and draining serosanguinous fluid. Culture obtained. Packed and covered with gauze.   Pelvic -   External genitalia postpartum: normal, well-healed, without lesions or masses.  Normal female hair distribution. Adequate perineal body. Urethral meatus without lesions or prolapse. Urethra: no masses, tenderness, or scarring.  Bladder: without tenderness or masses.  Vaginal mucosa moist and pink, normal rugae, without lesions, abnormal discharge, or foul odor.  Cervix pink, no lesions, no cervical motion tenderness.  Uterus: midline, non tender, smooth, not enlarged, not prolapsed  No adnexal masses or tenderness.  Extremities - no edema, redness or tenderness in the lower extremities or calves       PLAN:    1. Encounter for post surgical wound check  - sulfamethoxazole-trimethoprim 400-80mg (BACTRIM) 400-80 mg per tablet; Take 1 tablet by mouth 2 (two) times daily. for 10 days  Dispense: 20 tablet; Refill: 0  - CULTURE, AEROBIC  (SPECIFY SOURCE)     Aerobic culture    Culture of incision obtained. No tracking noted. Discussed signs and symptoms of infection including increased redness, warmth, pain, oozing, and fever. Discussed to keep incision clean and dry. Pt instructed to call clinic if incision worsens     Follow up with Dr Petersen next week for wound check

## 2023-02-12 LAB
BACTERIA SPEC AEROBE CULT: ABNORMAL
BACTERIA SPEC AEROBE CULT: ABNORMAL

## 2023-02-13 NOTE — ANESTHESIA PREPROCEDURE EVALUATION
02/13/2023  Ole Allen is a 33 y.o., female.      Pre-op Assessment     I have reviewed the Nursing Notes.       Review of Systems  Anesthesia Hx:  No problems with previous Anesthesia    Social:  Non-Smoker    Cardiovascular:   Denies Pacemaker. Hypertension     Pulmonary:   Denies Pneumonia Denies COPD. Asthma  Denies Shortness of breath.  Denies Recent URI.    Renal/:  Renal/ Normal     Hepatic/GI:   No Bowel Prep. Denies PUD. Denies Liver Disease. Denies Hepatitis.    Neurological:  Neurology Normal    Endocrine:   Denies Diabetes. Denies Hypothyroidism. Denies Hyperthyroidism.  Morbid Obesity / BMI > 40      Physical Exam  General: Well nourished, Cooperative and Alert    Airway:  Mallampati: III   Mouth Opening: Normal  TM Distance: Normal  Tongue: Normal  Neck ROM: Normal ROM    Dental:  Intact        Anesthesia Plan  Type of Anesthesia, risks & benefits discussed:    Anesthesia Type: Spinal  Intra-op Monitoring Plan: Standard ASA Monitors  Post Op Pain Control Plan: multimodal analgesia  Induction:  IV  Informed Consent: Informed consent signed with the Patient and all parties understand the risks and agree with anesthesia plan.  All questions answered.   ASA Score: 2    Ready For Surgery From Anesthesia Perspective.     .

## 2023-02-13 NOTE — ANESTHESIA POSTPROCEDURE EVALUATION
Anesthesia Post Evaluation    Patient: Ole Allen    Procedure(s) Performed: Procedure(s) (LRB):   SECTION (N/A)    Final Anesthesia Type: spinal      Patient location during evaluation: labor & delivery  Patient participation: Yes- Able to Participate  Level of consciousness: awake and alert  Post-procedure vital signs: reviewed and stable  Pain management: adequate  Airway patency: patent    PONV status at discharge: No PONV  Anesthetic complications: no      Cardiovascular status: blood pressure returned to baseline and hemodynamically stable  Respiratory status: unassisted and spontaneous ventilation  Hydration status: euvolemic  Follow-up not needed.          Vitals Value Taken Time   /80 02/10/23 1048   Temp 36.8 °C (98.3 °F) 23 1221   Pulse 82 23 1221   Resp 18 23 1221   SpO2 96 % 23 1221         Event Time   Out of Recovery 14:50:00         Pain/Sandy Score: No data recorded

## 2023-02-15 ENCOUNTER — TELEPHONE (OUTPATIENT)
Dept: OBSTETRICS AND GYNECOLOGY | Facility: CLINIC | Age: 33
End: 2023-02-15
Payer: MEDICAID

## 2023-02-15 NOTE — TELEPHONE ENCOUNTER
M for patient to inform her appointment has been changed to 02/16 at 3pm. Informed to call back if need to reschedule.      ----- Message from Avila Herman MD sent at 2/15/2023 10:04 AM CST -----  Regarding: RE: Wound drainage  Change her appt to tomorrow. She also needs to see wound care. Can we call to get her an appt tomorrow  ----- Message -----  From: Susan Dutton MA  Sent: 2/15/2023  10:01 AM CST  To: Avila Herman MD  Subject: Wound drainage                                   Please advise. She has an appointment with you on 02/17.  ----- Message -----  From: Pattie Tijerina MA  Sent: 2/14/2023   3:28 PM CST  To: Susan Dutton MA      ----- Message -----  From: Jossie Saul  Sent: 2/14/2023   1:44 PM CST  To: Ge[facility] Gramajo Staff     Type: Patient Call Back    Who called:self    What is the request in detail:pt wants to make sure the amount of drainage she has is normal. Her aunt that is a nurse suggested she check w the office because it is a lot that is draining out. Please call    Can the clinic reply by [facility]?    Would the patient rather a call back or a response via [facility]? call    Best call back number:[phone] (home)

## 2023-02-16 ENCOUNTER — POSTPARTUM VISIT (OUTPATIENT)
Dept: OBSTETRICS AND GYNECOLOGY | Facility: CLINIC | Age: 33
End: 2023-02-16
Payer: MEDICAID

## 2023-02-16 VITALS — BODY MASS INDEX: 40.02 KG/M2 | SYSTOLIC BLOOD PRESSURE: 130 MMHG | DIASTOLIC BLOOD PRESSURE: 70 MMHG | WEIGHT: 255.5 LBS

## 2023-02-16 PROCEDURE — 1111F PR DISCHARGE MEDS RECONCILED W/ CURRENT OUTPATIENT MED LIST: ICD-10-PCS | Mod: CPTII,,, | Performed by: OBSTETRICS & GYNECOLOGY

## 2023-02-16 PROCEDURE — 59430 PR CARE AFTER DELIVERY ONLY: ICD-10-PCS | Mod: S$PBB,,, | Performed by: OBSTETRICS & GYNECOLOGY

## 2023-02-16 PROCEDURE — 12021 TX SUPFC WND DEHSN W/PACKING: CPT | Mod: PBBFAC | Performed by: OBSTETRICS & GYNECOLOGY

## 2023-02-16 PROCEDURE — 99999 PR PBB SHADOW E&M-EST. PATIENT-LVL III: ICD-10-PCS | Mod: PBBFAC,,, | Performed by: OBSTETRICS & GYNECOLOGY

## 2023-02-16 PROCEDURE — 12021 TX SUPFC WND DEHSN W/PACKING: CPT | Mod: S$PBB,51,, | Performed by: OBSTETRICS & GYNECOLOGY

## 2023-02-16 PROCEDURE — 99213 OFFICE O/P EST LOW 20 MIN: CPT | Mod: PBBFAC,TH | Performed by: OBSTETRICS & GYNECOLOGY

## 2023-02-16 PROCEDURE — 99999 PR PBB SHADOW E&M-EST. PATIENT-LVL III: CPT | Mod: PBBFAC,,, | Performed by: OBSTETRICS & GYNECOLOGY

## 2023-02-16 PROCEDURE — 12021 PR CLOSURE SUPERF WND DEHIS W PACKING: ICD-10-PCS | Mod: S$PBB,51,, | Performed by: OBSTETRICS & GYNECOLOGY

## 2023-02-16 PROCEDURE — 1111F DSCHRG MED/CURRENT MED MERGE: CPT | Mod: CPTII,,, | Performed by: OBSTETRICS & GYNECOLOGY

## 2023-02-16 RX ORDER — SULFAMETHOXAZOLE AND TRIMETHOPRIM 800; 160 MG/1; MG/1
1 TABLET ORAL 2 TIMES DAILY
Qty: 28 TABLET | Refills: 0 | Status: SHIPPED | OUTPATIENT
Start: 2023-02-16 | End: 2023-03-02

## 2023-02-16 RX ORDER — HYDROCODONE BITARTRATE AND ACETAMINOPHEN 5; 325 MG/1; MG/1
1 TABLET ORAL EVERY 6 HOURS PRN
Qty: 15 TABLET | Refills: 0 | Status: SHIPPED | OUTPATIENT
Start: 2023-02-16

## 2023-02-16 NOTE — PROGRESS NOTES
History & Physical  Gynecology      SUBJECTIVE:     Chief Complaint: Wound Check       History of Present Illness:  Ole Allen is a 33 y.o. female  is here for a wound check. She was seen last week at which time she was found to have a wound dehiscence which was packed. She is s/p  repeat low cervical transverse  section. She denies fever, child, nausea, and vomiting. She denies pain, redness, warmth, or fever.       Review of patient's allergies indicates:   Allergen Reactions    Dog dander     House dust     Cat/feline products        Past Medical History:   Diagnosis Date    Asthma     Hemoglobin S trait     Herpes     Hypertension      Past Surgical History:   Procedure Laterality Date    ANTERIOR CRUCIATE LIGAMENT REPAIR       SECTION       SECTION N/A 10/22/2021    Procedure:  SECTION;  Surgeon: Julie R. Jeansonne, MD;  Location: [facility] L&D;  Service: OB/GYN;  Laterality: N/A;     SECTION N/A 10/22/2021    Procedure:  SECTION;  Surgeon: Sissy Jean MD;  Location: [facility] L&D;  Service: OB/GYN;  Laterality: N/A;     SECTION N/A 2/3/2023    Procedure:  SECTION;  Surgeon: Avila Herman MD;  Location: [facility] L&D OR;  Service: OB/GYN;  Laterality: N/A;    DILATION AND CURETTAGE OF UTERUS N/A     DILATION AND CURETTAGE OF UTERUS USING SUCTION N/A      OB History          5    Para   3    Term   3            AB   2    Living   3         SAB        IAB   2    Ectopic        Multiple   0    Live Births   3           Obstetric Comments   Gynhx: reg  H/o genital herpes, chlamydia               Family History   Adopted: Yes   Problem Relation Age of Onset    Miscarriages / Stillbirths Neg Hx     Cancer Neg Hx      Social History     Tobacco Use    Smoking status: Never    Smokeless tobacco: Never   Substance Use Topics    Alcohol use: Not Currently     Comment: occ    Drug use: Yes     Types: Marijuana        Current Outpatient Medications   Medication Sig    albuterol (PROVENTIL/VENTOLIN HFA) 90 mcg/actuation inhaler Inhale 2 puffs into the lungs every 6 (six) hours as needed for Wheezing. Rescue    albuterol sulfate 2.5 mg/0.5 mL Nebu Take 2.5 mg by nebulization every 4 (four) hours as needed (asthma exacerbation).    ibuprofen (ADVIL,MOTRIN) 800 MG tablet Take 1 tablet (800 mg total) by mouth every 8 (eight) hours as needed for Pain.    NIFEdipine (PROCARDIA-XL) 30 MG (OSM) 24 hr tablet Take 1 tablet (30 mg total) by mouth once daily.    HYDROcodone-acetaminophen (NORCO) 5-325 mg per tablet Take 1 tablet by mouth every 6 (six) hours as needed for Pain.     No current facility-administered medications for this visit.         Review of Systems:  Review of Systems   Constitutional:  Negative for chills and fever.   Eyes:  Negative for visual disturbance.   Respiratory:  Negative for cough and wheezing.    Cardiovascular:  Negative for chest pain and palpitations.   Gastrointestinal:  Positive for abdominal pain. Negative for nausea and vomiting.   Genitourinary:  Negative for dysuria, frequency, hematuria, pelvic pain, vaginal bleeding, vaginal discharge and vaginal pain.   Neurological:  Negative for headaches.   Psychiatric/Behavioral:  Negative for depression.       OBJECTIVE:     Physical Exam:  Physical Exam  Vitals and nursing note reviewed.   Constitutional:       Appearance: Normal appearance. She is well-developed.   Cardiovascular:      Rate and Rhythm: Normal rate.   Pulmonary:      Effort: Pulmonary effort is normal. No respiratory distress.   Abdominal:      General: There is no distension.      Palpations: Abdomen is soft.      Tenderness: There is no abdominal tenderness.      Comments: 1-2 cm dehiscence in midline incision with clear fluid, possible seroma   Genitourinary:     Exam position: Supine.   Skin:     General: Skin is warm and dry.   Neurological:      Mental Status: She is oriented to  person, place, and time.     ASSESSMENT:       ICD-10-CM ICD-9-CM    1. Wound dehiscence,   O90.0 674.10 sulfamethoxazole-trimethoprim 800-160mg (BACTRIM DS) 800-160 mg Tab      HYDROcodone-acetaminophen (NORCO) 5-325 mg per tablet        Plan:      Ole Saavedra was seen today for wound check.    Diagnoses and all orders for this visit:    Wound dehiscence,   -     sulfamethoxazole-trimethoprim 800-160mg (BACTRIM DS) 800-160 mg Tab; Take 1 tablet by mouth 2 (two) times daily. for 14 days (Patient not taking: Reported on 3/1/2023)  -     HYDROcodone-acetaminophen (NORCO) 5-325 mg per tablet; Take 1 tablet by mouth every 6 (six) hours as needed for Pain.        No orders of the defined types were placed in this encounter.      Follow up in about 1 week (around 2023) for Follow up.    Counseling time: 30 minutes    Avila Herman

## 2023-02-22 ENCOUNTER — TELEPHONE (OUTPATIENT)
Dept: OBSTETRICS AND GYNECOLOGY | Facility: CLINIC | Age: 33
End: 2023-02-22
Payer: MEDICAID

## 2023-02-22 NOTE — TELEPHONE ENCOUNTER
Pt advised while her aunt was packing her wound, she noticed a new spot that was draining. Next day appt scheduled with Dr. Herman. Pt was previously referred to wound care. Spoke with Catrachita and Consuelo with wound care for sooner appt. No sooner appt available. Appt added to wait list.     ----- Message from Ryanne Perez sent at 2023 11:18 AM CST -----  Regarding: [phone]  Type:  Same Day Appointment Request    Caller is requesting a same day appointment.  Caller declined first available   appointment listed below.      Name of Caller: self    When is the first available appointment?     Symptoms: wound check,  wound is opening back up.      Would the patient rather a call back or a response via [facility]? Call back      Best Call Back Number: [phone]      Additional Information: pt states she will like a call back with an appt time for today or a sooner appt, that's her wound has reopened.

## 2023-02-23 ENCOUNTER — OFFICE VISIT (OUTPATIENT)
Dept: OBSTETRICS AND GYNECOLOGY | Facility: CLINIC | Age: 33
End: 2023-02-23
Payer: MEDICAID

## 2023-02-23 VITALS
BODY MASS INDEX: 40.59 KG/M2 | WEIGHT: 259.13 LBS | DIASTOLIC BLOOD PRESSURE: 72 MMHG | SYSTOLIC BLOOD PRESSURE: 136 MMHG

## 2023-02-23 PROCEDURE — 99213 PR OFFICE/OUTPT VISIT, EST, LEVL III, 20-29 MIN: ICD-10-PCS | Mod: S$PBB,TH,24,25 | Performed by: OBSTETRICS & GYNECOLOGY

## 2023-02-23 PROCEDURE — 12021 TX SUPFC WND DEHSN W/PACKING: CPT | Mod: PBBFAC | Performed by: OBSTETRICS & GYNECOLOGY

## 2023-02-23 PROCEDURE — 1160F PR REVIEW ALL MEDS BY PRESCRIBER/CLIN PHARMACIST DOCUMENTED: ICD-10-PCS | Mod: CPTII,,, | Performed by: OBSTETRICS & GYNECOLOGY

## 2023-02-23 PROCEDURE — 99999 PR PBB SHADOW E&M-EST. PATIENT-LVL III: CPT | Mod: PBBFAC,,, | Performed by: OBSTETRICS & GYNECOLOGY

## 2023-02-23 PROCEDURE — 1111F DSCHRG MED/CURRENT MED MERGE: CPT | Mod: CPTII,,, | Performed by: OBSTETRICS & GYNECOLOGY

## 2023-02-23 PROCEDURE — 1111F PR DISCHARGE MEDS RECONCILED W/ CURRENT OUTPATIENT MED LIST: ICD-10-PCS | Mod: CPTII,,, | Performed by: OBSTETRICS & GYNECOLOGY

## 2023-02-23 PROCEDURE — 1160F RVW MEDS BY RX/DR IN RCRD: CPT | Mod: CPTII,,, | Performed by: OBSTETRICS & GYNECOLOGY

## 2023-02-23 PROCEDURE — 99213 OFFICE O/P EST LOW 20 MIN: CPT | Mod: PBBFAC,TH | Performed by: OBSTETRICS & GYNECOLOGY

## 2023-02-23 PROCEDURE — 99999 PR PBB SHADOW E&M-EST. PATIENT-LVL III: ICD-10-PCS | Mod: PBBFAC,,, | Performed by: OBSTETRICS & GYNECOLOGY

## 2023-02-23 PROCEDURE — 3078F DIAST BP <80 MM HG: CPT | Mod: CPTII,,, | Performed by: OBSTETRICS & GYNECOLOGY

## 2023-02-23 PROCEDURE — 3078F PR MOST RECENT DIASTOLIC BLOOD PRESSURE < 80 MM HG: ICD-10-PCS | Mod: CPTII,,, | Performed by: OBSTETRICS & GYNECOLOGY

## 2023-02-23 PROCEDURE — 1159F PR MEDICATION LIST DOCUMENTED IN MEDICAL RECORD: ICD-10-PCS | Mod: CPTII,,, | Performed by: OBSTETRICS & GYNECOLOGY

## 2023-02-23 PROCEDURE — 99213 OFFICE O/P EST LOW 20 MIN: CPT | Mod: S$PBB,TH,24,25 | Performed by: OBSTETRICS & GYNECOLOGY

## 2023-02-23 PROCEDURE — 3075F PR MOST RECENT SYSTOLIC BLOOD PRESS GE 130-139MM HG: ICD-10-PCS | Mod: CPTII,,, | Performed by: OBSTETRICS & GYNECOLOGY

## 2023-02-23 PROCEDURE — 3075F SYST BP GE 130 - 139MM HG: CPT | Mod: CPTII,,, | Performed by: OBSTETRICS & GYNECOLOGY

## 2023-02-23 PROCEDURE — 3008F BODY MASS INDEX DOCD: CPT | Mod: CPTII,,, | Performed by: OBSTETRICS & GYNECOLOGY

## 2023-02-23 PROCEDURE — 1159F MED LIST DOCD IN RCRD: CPT | Mod: CPTII,,, | Performed by: OBSTETRICS & GYNECOLOGY

## 2023-02-23 PROCEDURE — 12021 PR CLOSURE SUPERF WND DEHIS W PACKING: ICD-10-PCS | Mod: S$PBB,,, | Performed by: OBSTETRICS & GYNECOLOGY

## 2023-02-23 PROCEDURE — 12021 TX SUPFC WND DEHSN W/PACKING: CPT | Mod: S$PBB,,, | Performed by: OBSTETRICS & GYNECOLOGY

## 2023-02-23 PROCEDURE — 3008F PR BODY MASS INDEX (BMI) DOCUMENTED: ICD-10-PCS | Mod: CPTII,,, | Performed by: OBSTETRICS & GYNECOLOGY

## 2023-02-23 NOTE — PROGRESS NOTES
History & Physical  Gynecology      SUBJECTIVE:     Chief Complaint: Wound Check       History of Present Illness:  Ole Allen is a 33 y.o. female  is here for a wound check. She was seen last week at which time she was found to have a wound dehiscence which was packed. She is s/p  repeat low cervical transverse  section. She denies fever, child, nausea, and vomiting. She denies pain, redness, warmth, or fever.       Review of patient's allergies indicates:   Allergen Reactions    Dog dander     House dust     Cat/feline products        Past Medical History:   Diagnosis Date    Asthma     Hemoglobin S trait     Herpes     Hypertension      Past Surgical History:   Procedure Laterality Date    ANTERIOR CRUCIATE LIGAMENT REPAIR       SECTION       SECTION N/A 10/22/2021    Procedure:  SECTION;  Surgeon: Julie R. Jeansonne, MD;  Location: [facility] L&D;  Service: OB/GYN;  Laterality: N/A;     SECTION N/A 10/22/2021    Procedure:  SECTION;  Surgeon: Sissy Jean MD;  Location: [facility] L&D;  Service: OB/GYN;  Laterality: N/A;     SECTION N/A 2/3/2023    Procedure:  SECTION;  Surgeon: Avila Herman MD;  Location: [facility] L&D OR;  Service: OB/GYN;  Laterality: N/A;    DILATION AND CURETTAGE OF UTERUS N/A     DILATION AND CURETTAGE OF UTERUS USING SUCTION N/A      OB History          5    Para   3    Term   3            AB   2    Living   3         SAB        IAB   2    Ectopic        Multiple   0    Live Births   3           Obstetric Comments   Gynhx: reg  H/o genital herpes, chlamydia               Family History   Adopted: Yes   Problem Relation Age of Onset    Miscarriages / Stillbirths Neg Hx     Cancer Neg Hx      Social History     Tobacco Use    Smoking status: Never    Smokeless tobacco: Never   Substance Use Topics    Alcohol use: Not Currently     Comment: occ    Drug use: Yes     Types: Marijuana        Current Outpatient Medications   Medication Sig    albuterol (PROVENTIL/VENTOLIN HFA) 90 mcg/actuation inhaler Inhale 2 puffs into the lungs every 6 (six) hours as needed for Wheezing. Rescue    albuterol sulfate 2.5 mg/0.5 mL Nebu Take 2.5 mg by nebulization every 4 (four) hours as needed (asthma exacerbation).    HYDROcodone-acetaminophen (NORCO) 5-325 mg per tablet Take 1 tablet by mouth every 6 (six) hours as needed for Pain.    ibuprofen (ADVIL,MOTRIN) 800 MG tablet Take 1 tablet (800 mg total) by mouth every 8 (eight) hours as needed for Pain.    NIFEdipine (PROCARDIA-XL) 30 MG (OSM) 24 hr tablet Take 1 tablet (30 mg total) by mouth once daily.     No current facility-administered medications for this visit.         Review of Systems:  Review of Systems   Constitutional:  Negative for chills and fever.   Eyes:  Negative for visual disturbance.   Respiratory:  Negative for cough and wheezing.    Cardiovascular:  Negative for chest pain and palpitations.   Gastrointestinal:  Positive for abdominal pain. Negative for nausea and vomiting.   Genitourinary:  Negative for dysuria, frequency, hematuria, pelvic pain, vaginal bleeding, vaginal discharge and vaginal pain.   Neurological:  Negative for headaches.   Psychiatric/Behavioral:  Negative for depression.       OBJECTIVE:     Physical Exam:  Physical Exam  Vitals and nursing note reviewed.   Constitutional:       Appearance: Normal appearance. She is well-developed.   Cardiovascular:      Rate and Rhythm: Normal rate.   Pulmonary:      Effort: Pulmonary effort is normal. No respiratory distress.   Abdominal:      General: There is no distension.      Palpations: Abdomen is soft.      Tenderness: There is abdominal tenderness.      Comments: Area dehiscence present and improving   Genitourinary:     Exam position: Supine.   Skin:     General: Skin is warm and dry.   Neurological:      Mental Status: She is oriented to person, place, and time.          ASSESSMENT:       ICD-10-CM ICD-9-CM    1. Wound dehiscence,   O90.0 674.10           Plan:      Ole Saavedra was seen today for wound check.    Diagnoses and all orders for this visit:    Wound dehiscence,   - Wound packed  - Wound care appt scheduled      No orders of the defined types were placed in this encounter.      Follow up in about 1 week (around 3/2/2023) for Follow up.    Counseling time: 15 minutes    Avila Herman

## 2023-03-01 ENCOUNTER — HOSPITAL ENCOUNTER (OUTPATIENT)
Dept: WOUND CARE | Facility: HOSPITAL | Age: 33
Discharge: HOME | End: 2023-03-01
Attending: OBSTETRICS & GYNECOLOGY
Payer: MEDICAID

## 2023-03-01 ENCOUNTER — TELEPHONE (OUTPATIENT)
Dept: WOUND CARE | Facility: HOSPITAL | Age: 33
End: 2023-03-01

## 2023-03-01 VITALS — TEMPERATURE: 98 F | SYSTOLIC BLOOD PRESSURE: 110 MMHG | DIASTOLIC BLOOD PRESSURE: 58 MMHG | HEART RATE: 80 BPM

## 2023-03-01 PROCEDURE — 17250 CHEM CAUT OF GRANLTJ TISSUE: CPT | Performed by: FAMILY MEDICINE

## 2023-03-01 NOTE — PROGRESS NOTES
[facility]                Progress Note    Subjective:       Patient ID: Ole Allen is a 33 y.o. female.    Chief Complaint: No chief complaint on file.    HPI    Review of Systems      Objective:        Physical Exam    Vitals:    23 1335   BP: (!) 110/58   Pulse: 80   Temp: 98.1 °F (36.7 °C)       Assessment:           ICD-10-CM ICD-9-CM   1.  section wound seroma, postpartum  O90.89 674.34            Altered Skin Integrity 23 anterior Pubis (Active)   23   Altered Skin Integrity Present on Admission: yes   Side:    Orientation: anterior   Location: Pubis   Wound Number:    Is this injury device related?:    Primary Wound Type:    Description of Altered Skin Integrity:    Ankle-Brachial Index:    Pulses:    Removal Indication and Assessment:    Wound Outcome:    (Retired) Wound Length (cm):    (Retired) Wound Width (cm):    (Retired) Depth (cm):    Wound Description (Comments):    Removal Indications:    Wound Image   23   Description of Altered Skin Integrity Full thickness tissue loss. Subcutaneous fat may be visible but bone, tendon or muscle are not exposed 23   Dressing Appearance Dry;Intact 23   Drainage Amount None 23   Drainage Characteristics/Odor No odor 23   Appearance Red 23   Tissue loss description Full thickness 23   Red (%), Wound Tissue Color 100 % 23   Periwound Area Macerated 23   Wound Edges Defined 23   Wound Length (cm) 0.3 cm 23 141   Wound Width (cm) 1 cm 23 141   Wound Depth (cm) 1 cm 23 141   Wound Volume (cm^3) 0.3 cm^3 23   Wound Surface Area (cm^2) 0.3 cm^2 23 141   Tunneling (depth (cm)/location) 0 23 141   Undermining (depth (cm)/location) 0 23 141   Care Cleansed with:;Antimicrobial agent;Sterile normal saline 23 141    Packing packing removed;packed with 03/01/23 1414   Packing Inserted  10 03/01/23 1414   Packing Removed 10 03/01/23 1414   Periwound Care Cleansed with pH balanced cleanser;Dry periwound area maintained 03/01/23 1414   Dressing Change Due 03/08/23 03/01/23 1414           Plan:              Tissue pathology and/or culture taken     [] Yes      [] No  Sharp debridement performed                   [] Yes       [] No  Labs ordered     [] Yes       [] No  Imaging ordered    [] Yes      [] No    Orders Placed This Encounter   Procedures    Ambulatory referral/consult to Wound Clinic     Standing Status:   Standing     Number of Occurrences:   1     Referral Priority:   Routine     Referral Type:   Consultation     Referral Reason:   Specialty Services Required     Requested Specialty:   Wound Care     Number of Visits Requested:   1        Follow up in about 1 week (around 3/8/2023).

## 2023-03-01 NOTE — PROGRESS NOTES
[facility]                Progress Note    Subjective:       Patient ID: Ole Allen is a 33 y.o. female.    Chief Complaint: Wound Check    Walked to clinic unaided discussed arriving 15 min before appointment to avoid risking missing appointment. Gauze dsg with plain packing about 10 cm in length removed from wound for oderless serous/sang colored packing no drainage on outside gauze. States that aunt, who is a nurse, changes daily. Pt had child last October and this February where she received C section to suprapubic region both times. She thought wound was healed but opened and started draining serous fluid for which she is on abx, has few more days left. Wound site appears mostly closed with raised pink area to right of wound that is closed and wound does not track.  One cm in wound depth with no undermining or tunnels. Will pack with Vashe dampened packing and cover with gauze, abd pad and tape.     Wound Check    Review of Systems      Objective:        Physical Exam    Vitals:    23 1335   BP: (!) 110/58   Pulse: 80   Temp: 98.1 °F (36.7 °C)       Assessment:           ICD-10-CM ICD-9-CM   1.  section wound seroma, postpartum  O90.89 674.34            Altered Skin Integrity 23 1413 anterior Pubis (Active)   23 1413   Altered Skin Integrity Present on Admission: yes   Side:    Orientation: anterior   Location: Pubis   Wound Number:    Is this injury device related?:    Primary Wound Type:    Description of Altered Skin Integrity:    Ankle-Brachial Index:    Pulses:    Removal Indication and Assessment:    Wound Outcome:    (Retired) Wound Length (cm):    (Retired) Wound Width (cm):    (Retired) Depth (cm):    Wound Description (Comments):    Removal Indications:    Wound Image   23 1414   Description of Altered Skin Integrity Full thickness tissue loss. Subcutaneous fat may be visible but bone, tendon or muscle are not  exposed 03/01/23 1414   Dressing Appearance Dry;Intact 03/01/23 1414   Drainage Amount None 03/01/23 1414   Drainage Characteristics/Odor No odor 03/01/23 1414   Appearance Red 03/01/23 1414   Tissue loss description Full thickness 03/01/23 1414   Red (%), Wound Tissue Color 100 % 03/01/23 1414   Periwound Area Macerated 03/01/23 1414   Wound Edges Defined 03/01/23 1414   Wound Length (cm) 0.3 cm 03/01/23 1414   Wound Width (cm) 1 cm 03/01/23 1414   Wound Depth (cm) 1 cm 03/01/23 1414   Wound Volume (cm^3) 0.3 cm^3 03/01/23 1414   Wound Surface Area (cm^2) 0.3 cm^2 03/01/23 1414   Tunneling (depth (cm)/location) 0 03/01/23 1414   Undermining (depth (cm)/location) 0 03/01/23 1414   Care Cleansed with:;Antimicrobial agent;Sterile normal saline 03/01/23 1414   Packing packing removed;packed with 03/01/23 1414   Packing Inserted  10 03/01/23 1414   Packing Removed 10 03/01/23 1414   Periwound Care Cleansed with pH balanced cleanser;Dry periwound area maintained 03/01/23 1414   Dressing Change Due 03/08/23 03/01/23 1414           Plan:              Tissue pathology and/or culture taken     [] Yes      [x] No  Sharp debridement performed                   [] Yes       [x] No  Labs ordered     [] Yes       [x] No  Imaging ordered    [] Yes      [x] No    Orders Placed This Encounter   Procedures    Ambulatory referral/consult to Wound Clinic     Standing Status:   Standing     Number of Occurrences:   1     Referral Priority:   Routine     Referral Type:   Consultation     Referral Reason:   Specialty Services Required     Requested Specialty:   Wound Care     Number of Visits Requested:   1    Change dressing     Pack wound with small 10cm long packing dampened with Vashe   Cover with gauze and abdominal pad   Tape to secure        Follow up in about 1 week (around 3/8/2023).

## 2023-03-08 ENCOUNTER — HOSPITAL ENCOUNTER (OUTPATIENT)
Dept: WOUND CARE | Facility: HOSPITAL | Age: 33
Discharge: HOME | End: 2023-03-08
Attending: FAMILY MEDICINE
Payer: MEDICAID

## 2023-03-08 VITALS — DIASTOLIC BLOOD PRESSURE: 73 MMHG | SYSTOLIC BLOOD PRESSURE: 136 MMHG | HEART RATE: 88 BPM | TEMPERATURE: 98 F

## 2023-03-08 PROCEDURE — 17250 CHEM CAUT OF GRANLTJ TISSUE: CPT | Performed by: FAMILY MEDICINE

## 2023-03-08 PROCEDURE — 99214 PR OFFICE/OUTPT VISIT, EST, LEVL IV, 30-39 MIN: ICD-10-PCS | Mod: 25,,, | Performed by: FAMILY MEDICINE

## 2023-03-08 PROCEDURE — 99214 OFFICE O/P EST MOD 30 MIN: CPT | Mod: 25,,, | Performed by: FAMILY MEDICINE

## 2023-03-08 NOTE — PATIENT INSTRUCTIONS
Remove old dressing.  Clean wound with Vashe soaked applicator.   Then insert Promogran (collagen) into wound hole (small piece).   Clean around wound with Vashe, pat dry then reapply ABD pad to skin.

## 2023-03-08 NOTE — PROGRESS NOTES
[facility]                Progress Note    Subjective:       Patient ID: Ole Allen is a 33 y.o. female.    Chief Complaint: Wound Check    Follow up wound care visit. Patient ambulated to exam room unaided. C/o pain to wound bed rating as 5/10 at present. Denies fever, chills or flu-like symptoms. Dressing intact with a small amount of serosanguineous, non-malodor drainage noted. She reports last change was yesterday. Right Lower Pubis wound is measuring smaller in (0.2 cm) width and (0.4 cm) depth.     Wound care done as per order. Return to clinic in 1 week. AVS printed and given to patient, with instructions included from MD on wound care at home.    This is an established patient in wound care.   Patient presents in the office today for evaluation of the chronic wound.  Updated HPI is noted below.    The periwound appears non-erythematous, no maceration noted, edematous    The wound appears wound healing    Patient denies fever, chills    Patients reports wound pain    Lymphedema status is contributory to wound status    Pain at the site of the wound is aching and throbbing    Contributing factors to current wound state include numerous comorbid conditions, Lymphedema      Review of Systems   Constitutional:  Negative for activity change, appetite change and fever.   HENT:  Negative for congestion.    Respiratory:  Negative for shortness of breath and wheezing.    Cardiovascular:  Negative for chest pain and leg swelling.   Gastrointestinal:  Negative for abdominal pain, nausea and vomiting.   Skin:  Positive for wound.   Neurological:  Negative for dizziness and headaches.   Psychiatric/Behavioral:  The patient is not nervous/anxious.        Objective:        Physical Exam  Vitals and nursing note reviewed.   Constitutional:       General: She is not in acute distress.     Appearance: She is well-developed.   HENT:      Head: Normocephalic and  atraumatic.   Eyes:      Conjunctiva/sclera: Conjunctivae normal.   Pulmonary:      Effort: Pulmonary effort is normal.   Abdominal:      General: There is no distension.   Musculoskeletal:         General: Normal range of motion.      Cervical back: Normal range of motion.   Skin:     General: Skin is warm.      Comments: See wound description   Neurological:      Mental Status: She is alert and oriented to person, place, and time.   Psychiatric:         Behavior: Behavior normal.         Thought Content: Thought content normal.         Judgment: Judgment normal.       Vitals:    23 1525   BP: 136/73   Pulse: 88   Temp: 98.1 °F (36.7 °C)       Assessment:           ICD-10-CM ICD-9-CM   1.  section wound seroma, postpartum  O90.89 674.34            Altered Skin Integrity 23 1413 anterior Pubis (Active)   23 1413   Altered Skin Integrity Present on Admission - Did Patient arrive to the hospital with altered skin?: yes   Side:    Orientation: anterior   Location: Pubis   Wound Number:    Is this injury device related?:    Primary Wound Type:    Description of Altered Skin Integrity:    Ankle-Brachial Index:    Pulses:    Removal Indication and Assessment:    Wound Outcome:    (Retired) Wound Length (cm):    (Retired) Wound Width (cm):    (Retired) Depth (cm):    Wound Description (Comments):    Removal Indications:    Wound Image    23 155   Description of Altered Skin Integrity Full thickness tissue loss. Subcutaneous fat may be visible but bone, tendon or muscle are not exposed 23 155   Dressing Appearance Intact;Moist drainage 23 1552   Drainage Amount Small 23 155   Drainage Characteristics/Odor Serosanguineous;No odor 23 155   Appearance Red;Moist 23 1552   Tissue loss description Full thickness 23 1552   Black (%), Wound Tissue Color 0 % 23 1552   Red (%), Wound Tissue Color 100 % 23 1552   Yellow (%), Wound Tissue Color 0 %  03/08/23 1552   Periwound Area Dry;Intact 03/08/23 1552   Wound Edges Defined;Open 03/08/23 1552   Wound Length (cm) 0.3 cm 03/08/23 1552   Wound Width (cm) 0.8 cm 03/08/23 1552   Wound Depth (cm) 0.6 cm 03/08/23 1552   Wound Volume (cm^3) 0.144 cm^3 03/08/23 1552   Wound Surface Area (cm^2) 0.24 cm^2 03/08/23 1552   Tunneling (depth (cm)/location) 0 03/08/23 1552   Undermining (depth (cm)/location) 0 03/08/23 1552   Care Cleansed with:;Antimicrobial agent;Sterile normal saline 03/08/23 1552   Dressing Changed 03/08/23 1552   Packing packing removed 03/08/23 1552   Packing Removed 1 03/08/23 1552   Dressing Change Due 03/15/23 03/08/23 1552           Plan:            Chemical cauterization with silver nitrate stick x 1 of wound bed done in clinic for treatment of hypergranulation or to decrease biofilm burden. Patient tolerated procedure well.   Increase protein   Start collagen dressing  Continue daily dressing changes   Keep dressing clean and intact  Continue with wound care orders and plan as noted in orders.   Continue to follow current medication regimen as per pcp   Call for any questions / concerns.       Tissue pathology and/or culture taken     [] Yes      [x] No  Chemical debridement performed              [x] Yes       [] No  Labs ordered     [] Yes       [x] No  Imaging ordered    [] Yes      [x] No    Orders Placed This Encounter   Procedures    Change dressing     Wound Dressing Orders    Dressing change frequency daily  Remove old dressing  Cleanse or irrigate with: Wound Cleanser (Vashe)  Protect periwound with:  periwound: Cavilon   Primary dressing - adjust to fit: WOUND BASE: Promogran packed into wound bed (remainder given to patient)  Secondary dressing: ABD Pad    Patient given instruction to change dressing daily cleaning with Vashe and reapplying Promogran into wound at home.        Follow up in about 1 week (around 3/15/2023) for wound care.

## 2023-03-09 ENCOUNTER — POSTPARTUM VISIT (OUTPATIENT)
Dept: OBSTETRICS AND GYNECOLOGY | Facility: CLINIC | Age: 33
End: 2023-03-09
Payer: MEDICAID

## 2023-03-09 VITALS
DIASTOLIC BLOOD PRESSURE: 70 MMHG | WEIGHT: 254.06 LBS | BODY MASS INDEX: 39.79 KG/M2 | SYSTOLIC BLOOD PRESSURE: 132 MMHG

## 2023-03-09 DIAGNOSIS — Z51.89 VISIT FOR WOUND CHECK: Primary | ICD-10-CM

## 2023-03-09 PROCEDURE — 99999 PR PBB SHADOW E&M-EST. PATIENT-LVL III: CPT | Mod: PBBFAC,,, | Performed by: OBSTETRICS & GYNECOLOGY

## 2023-03-09 PROCEDURE — 99213 PR OFFICE/OUTPT VISIT, EST, LEVL III, 20-29 MIN: ICD-10-PCS | Mod: S$PBB,24,25,TH | Performed by: OBSTETRICS & GYNECOLOGY

## 2023-03-09 PROCEDURE — 99999 PR PBB SHADOW E&M-EST. PATIENT-LVL III: ICD-10-PCS | Mod: PBBFAC,,, | Performed by: OBSTETRICS & GYNECOLOGY

## 2023-03-09 PROCEDURE — 99213 OFFICE O/P EST LOW 20 MIN: CPT | Mod: S$PBB,24,25,TH | Performed by: OBSTETRICS & GYNECOLOGY

## 2023-03-09 PROCEDURE — 12021 PR CLOSURE SUPERF WND DEHIS W PACKING: ICD-10-PCS | Mod: S$PBB,,, | Performed by: OBSTETRICS & GYNECOLOGY

## 2023-03-09 PROCEDURE — 12021 TX SUPFC WND DEHSN W/PACKING: CPT | Mod: S$PBB,,, | Performed by: OBSTETRICS & GYNECOLOGY

## 2023-03-09 PROCEDURE — 12021 TX SUPFC WND DEHSN W/PACKING: CPT | Mod: PBBFAC | Performed by: OBSTETRICS & GYNECOLOGY

## 2023-03-09 PROCEDURE — 99213 OFFICE O/P EST LOW 20 MIN: CPT | Mod: PBBFAC,25 | Performed by: OBSTETRICS & GYNECOLOGY

## 2023-03-14 ENCOUNTER — HOSPITAL ENCOUNTER (OUTPATIENT)
Dept: WOUND CARE | Facility: HOSPITAL | Age: 33
Discharge: HOME | End: 2023-03-14
Attending: FAMILY MEDICINE
Payer: MEDICAID

## 2023-03-14 VITALS — DIASTOLIC BLOOD PRESSURE: 70 MMHG | TEMPERATURE: 98 F | HEART RATE: 70 BPM | SYSTOLIC BLOOD PRESSURE: 120 MMHG

## 2023-03-14 PROCEDURE — 99213 OFFICE O/P EST LOW 20 MIN: CPT | Mod: 25 | Performed by: FAMILY MEDICINE

## 2023-03-14 PROCEDURE — 99214 OFFICE O/P EST MOD 30 MIN: CPT | Mod: ,,, | Performed by: FAMILY MEDICINE

## 2023-03-14 PROCEDURE — 99214 PR OFFICE/OUTPT VISIT, EST, LEVL IV, 30-39 MIN: ICD-10-PCS | Mod: ,,, | Performed by: FAMILY MEDICINE

## 2023-03-14 PROCEDURE — 11042 DBRDMT SUBQ TIS 1ST 20SQCM/<: CPT

## 2023-03-14 PROCEDURE — 99213 OFFICE O/P EST LOW 20 MIN: CPT

## 2023-03-14 NOTE — PROGRESS NOTES
[facility]                Progress Note    Subjective:       Patient ID: Ole Allen is a 33 y.o. female.    Chief Complaint: Wound Check    Walked to clinic unaided with no complaints of pain. Wound has dry dressing over wound with decrease 0.3, 0.3 and 0.2 cm in length, width and depth. Has been placing Pomogram in wound daily after cleaning with Vashe. Reviewed orders. Will clean around wound with vashe every 2-3 days then apply Promogram covered with gauze. Verbalized understanding. Will return in one week.      MD note:  patient following up today for follow up of abdominal wound post  site dehiscence.  No pain today in wound site.  She has been changing the collagen daily.  No fevrs, chills, or sweats.  No odor from wound that she has noticed.      Wound Check    Review of Systems   Constitutional: Negative.    HENT: Negative.     Eyes: Negative.    Respiratory: Negative.     Cardiovascular: Negative.    Gastrointestinal: Negative.    Skin:  Positive for wound.   Psychiatric/Behavioral: Negative.         Objective:        Physical Exam  Constitutional:       Appearance: Normal appearance.   HENT:      Head: Normocephalic and atraumatic.      Mouth/Throat:      Mouth: Mucous membranes are moist.      Pharynx: Oropharynx is clear.   Eyes:      Extraocular Movements: Extraocular movements intact.      Conjunctiva/sclera: Conjunctivae normal.      Pupils: Pupils are equal, round, and reactive to light.   Cardiovascular:      Rate and Rhythm: Normal rate and regular rhythm.   Abdominal:      General: There is no distension.      Palpations: Abdomen is soft.   Skin:     General: Skin is warm.      Comments: +small opening to lower abdomen without purulent drainage or surrounding erythema   Neurological:      Mental Status: She is alert.       Vitals:    23 1607   BP: 120/70   Pulse: 70   Temp: 97.7 °F (36.5 °C)       Assessment:            ICD-10-CM ICD-9-CM   1.  section wound seroma, postpartum  O90.89 674.34            Altered Skin Integrity 23 1413 anterior Pubis (Active)   23 1413   Altered Skin Integrity Present on Admission - Did Patient arrive to the hospital with altered skin?: yes   Side:    Orientation: anterior   Location: Pubis   Wound Number:    Is this injury device related?:    Primary Wound Type:    Description of Altered Skin Integrity:    Ankle-Brachial Index:    Pulses:    Removal Indication and Assessment:    Wound Outcome:    (Retired) Wound Length (cm):    (Retired) Wound Width (cm):    (Retired) Depth (cm):    Wound Description (Comments):    Removal Indications:    Wound Image   23 1628   Description of Altered Skin Integrity Partial thickness tissue loss. Shallow open ulcer with a red or pink wound bed, without slough. Intact or Open/Ruptured Serum-filled blister. 23 1628   Dressing Appearance Dry;Intact 23 1628   Drainage Amount None 23 1628   Appearance Red 23 1628   Tissue loss description Full thickness 23 1628   Red (%), Wound Tissue Color 100 % 23 1628   Periwound Area Intact;Dry 23 1628   Wound Edges Defined 23 1628   Wound Length (cm) 0.3 cm 23 1628   Wound Width (cm) 0.5 cm 23 1628   Wound Depth (cm) 0.4 cm 23 1628   Wound Volume (cm^3) 0.06 cm^3 23 1628   Wound Surface Area (cm^2) 0.15 cm^2 23 1628   Care Cleansed with:;Antimicrobial agent;Sterile normal saline 23 1628   Dressing Changed 23 1628   Dressing Change Due 23 1628           Plan:              Tissue pathology and/or culture taken     [] Yes      [x] No  Sharp debridement performed                   [] Yes       [x] No  Labs ordered     [] Yes       [x] No  Imaging ordered    [] Yes      [x] No    Orders Placed This Encounter   Procedures    Change dressing     Dressing change frequency daily   Remove old dressing   Cleanse or  irrigate with: Wound Cleanser (Vashe)   Protect periwound with:  periwound: Cavilon   Primary dressing - adjust to fit: WOUND BASE: Promogran packed into wound bed (remainder given to patient)   Secondary dressing:folded gauze taped        Follow up in about 1 week (around 3/21/2023).     Raffi Fitzpatrick MD

## 2023-03-14 NOTE — PROGRESS NOTES
History & Physical  Gynecology      SUBJECTIVE:     Chief Complaint: Wound Check       History of Present Illness:  Ole Allen is a 33 y.o. female  is here for a wound check.  She is s/p  repeat low cervical transverse  section. She denies fever, child, nausea, and vomiting. She denies pain, redness, warmth, or fever.       Review of patient's allergies indicates:   Allergen Reactions    Dog dander     House dust     Cat/feline products        Past Medical History:   Diagnosis Date    Asthma     Hemoglobin S trait     Herpes     Hypertension      Past Surgical History:   Procedure Laterality Date    ANTERIOR CRUCIATE LIGAMENT REPAIR       SECTION       SECTION N/A 10/22/2021    Procedure:  SECTION;  Surgeon: Julie R. Jeansonne, MD;  Location: [facility] L&D;  Service: OB/GYN;  Laterality: N/A;     SECTION N/A 10/22/2021    Procedure:  SECTION;  Surgeon: Sissy Jean MD;  Location: [facility] L&D;  Service: OB/GYN;  Laterality: N/A;     SECTION N/A 2/3/2023    Procedure:  SECTION;  Surgeon: Avila Herman MD;  Location: [facility] L&D OR;  Service: OB/GYN;  Laterality: N/A;    DILATION AND CURETTAGE OF UTERUS N/A     DILATION AND CURETTAGE OF UTERUS USING SUCTION N/A      OB History          5    Para   3    Term   3            AB   2    Living   3         SAB        IAB   2    Ectopic        Multiple   0    Live Births   3           Obstetric Comments   Gynhx: reg  H/o genital herpes, chlamydia               Family History   Adopted: Yes   Problem Relation Age of Onset    Miscarriages / Stillbirths Neg Hx     Cancer Neg Hx      Social History     Tobacco Use    Smoking status: Never    Smokeless tobacco: Never   Substance Use Topics    Alcohol use: Not Currently     Comment: occ    Drug use: Yes     Types: Marijuana       Current Outpatient Medications   Medication Sig    albuterol (PROVENTIL/VENTOLIN HFA) 90  mcg/actuation inhaler Inhale 2 puffs into the lungs every 6 (six) hours as needed for Wheezing. Rescue    albuterol sulfate 2.5 mg/0.5 mL Nebu Take 2.5 mg by nebulization every 4 (four) hours as needed (asthma exacerbation).    HYDROcodone-acetaminophen (NORCO) 5-325 mg per tablet Take 1 tablet by mouth every 6 (six) hours as needed for Pain.    ibuprofen (ADVIL,MOTRIN) 800 MG tablet Take 1 tablet (800 mg total) by mouth every 8 (eight) hours as needed for Pain.    NIFEdipine (PROCARDIA-XL) 30 MG (OSM) 24 hr tablet Take 1 tablet (30 mg total) by mouth once daily.     No current facility-administered medications for this visit.       Review of Systems:  Review of Systems   Constitutional:  Negative for chills and fever.   Eyes:  Negative for visual disturbance.   Respiratory:  Negative for cough and wheezing.    Cardiovascular:  Negative for chest pain and palpitations.   Gastrointestinal:  Positive for abdominal pain. Negative for nausea and vomiting.   Genitourinary:  Negative for dysuria, frequency, hematuria, pelvic pain, vaginal bleeding, vaginal discharge and vaginal pain.   Neurological:  Negative for headaches.   Psychiatric/Behavioral:  Negative for depression.       OBJECTIVE:     Physical Exam:  Physical Exam  Vitals and nursing note reviewed.   Constitutional:       Appearance: Normal appearance. She is well-developed.   Cardiovascular:      Rate and Rhythm: Normal rate.   Pulmonary:      Effort: Pulmonary effort is normal. No respiratory distress.   Abdominal:      General: There is no distension.      Palpations: Abdomen is soft.      Tenderness: There is no abdominal tenderness.      Comments: Incision healing well, <1cm packed   Genitourinary:     Exam position: Supine.   Skin:     General: Skin is warm and dry.   Neurological:      Mental Status: She is oriented to person, place, and time.       ASSESSMENT:       ICD-10-CM ICD-9-CM    1. Visit for wound check  Z51.89 V58.89         Plan:       Ole Saavedra was seen today for wound check.    Diagnoses and all orders for this visit:    Visit for wound check  - Wound packed today  - Followed by wound care      No orders of the defined types were placed in this encounter.      Follow up in about 1 week (around 3/16/2023) for Postop F/U.    Counseling time: 15 minutes    Avila Herman

## 2023-03-21 ENCOUNTER — HOSPITAL ENCOUNTER (OUTPATIENT)
Dept: WOUND CARE | Facility: HOSPITAL | Age: 33
Discharge: HOME | End: 2023-03-21
Attending: FAMILY MEDICINE
Payer: MEDICAID

## 2023-03-21 VITALS
HEART RATE: 72 BPM | SYSTOLIC BLOOD PRESSURE: 142 MMHG | DIASTOLIC BLOOD PRESSURE: 85 MMHG | RESPIRATION RATE: 19 BRPM | TEMPERATURE: 98 F

## 2023-03-21 PROCEDURE — 99213 OFFICE O/P EST LOW 20 MIN: CPT | Performed by: FAMILY MEDICINE

## 2023-03-21 NOTE — PROGRESS NOTES
[facility]                Progress Note    Subjective:       Patient ID: Ole Allen is a 33 y.o. female.    Chief Complaint: Wound Care    Pt arrived to [facility] ambulating without any issues. Pt denies any fever, chills, or flu-like symptoms; denies pain/discomfort. Pt's BP noted to be elevated but denies any cp, sob, ha, dizziness, lightheadedness, vision changes. Wound noted to be epithelized but instructed to call OBGY/PCP regarding elevalted BP. Pt being d/c from wound care but instructed to call back for any issues or concerns.       Review of Systems      Objective:        Physical Exam    Vitals:    03/21/23 1622   BP: (!) 142/85   Pulse: 72   Resp: 19   Temp: 97.7 °F (36.5 °C)       Assessment:         No diagnosis found.         Altered Skin Integrity 03/01/23 1413 anterior Pubis (Active)   03/01/23 1413   Altered Skin Integrity Present on Admission - Did Patient arrive to the hospital with altered skin?: yes   Side:    Orientation: anterior   Location: Pubis   Wound Number:    Is this injury device related?:    Primary Wound Type:    Description of Altered Skin Integrity:    Ankle-Brachial Index:    Pulses:    Removal Indication and Assessment:    Wound Outcome:    (Retired) Wound Length (cm):    (Retired) Wound Width (cm):    (Retired) Depth (cm):    Wound Description (Comments):    Removal Indications:    Wound Image   03/21/23 1624   Dressing Appearance Dry;Intact;Clean 03/21/23 1624   Drainage Amount None 03/21/23 1624   Appearance Epithelialization 03/21/23 1624   Periwound Area Intact 03/21/23 1624   Wound Length (cm) 0 cm 03/21/23 1624   Wound Width (cm) 0 cm 03/21/23 1624   Wound Depth (cm) 0 cm 03/21/23 1624   Wound Volume (cm^3) 0 cm^3 03/21/23 1624   Wound Surface Area (cm^2) 0 cm^2 03/21/23 1624   Tunneling (depth (cm)/location) 0 03/21/23 1624   Undermining (depth (cm)/location) 0 03/21/23 1624   Care Cleansed with:;Sterile normal saline  03/21/23 1624           Plan:              Tissue pathology and/or culture taken     [] Yes      [x] No  Sharp debridement performed                   [] Yes       [x] No  Labs ordered     [] Yes       [x] No  Imaging ordered    [] Yes      [x] No    No orders of the defined types were placed in this encounter.       No follow-ups on file.

## 2024-02-17 DIAGNOSIS — J45.909 ASTHMA AFFECTING PREGNANCY IN THIRD TRIMESTER: ICD-10-CM

## 2024-02-17 DIAGNOSIS — O99.513 ASTHMA AFFECTING PREGNANCY IN THIRD TRIMESTER: ICD-10-CM

## 2024-02-17 RX ORDER — ALBUTEROL SULFATE 90 UG/1
2 AEROSOL, METERED RESPIRATORY (INHALATION) EVERY 6 HOURS PRN
Qty: 25.5 G | Refills: 0 | Status: SHIPPED | OUTPATIENT
Start: 2024-02-17 | End: 2024-05-15

## 2024-02-17 NOTE — TELEPHONE ENCOUNTER
Refill Decision Note   Ole Allen  is requesting a refill authorization.  Brief Assessment and Rationale for Refill:  Approve     Medication Therapy Plan:       Medication Reconciliation Completed: No   Comments:     No Care Gaps recommended.     Note composed:11:38 AM 02/17/2024

## 2024-05-15 DIAGNOSIS — J45.909 ASTHMA AFFECTING PREGNANCY IN THIRD TRIMESTER: ICD-10-CM

## 2024-05-15 DIAGNOSIS — O99.513 ASTHMA AFFECTING PREGNANCY IN THIRD TRIMESTER: ICD-10-CM

## 2024-05-15 RX ORDER — ALBUTEROL SULFATE 90 UG/1
2 AEROSOL, METERED RESPIRATORY (INHALATION) EVERY 6 HOURS PRN
Qty: 6.7 G | Refills: 0 | Status: SHIPPED | OUTPATIENT
Start: 2024-05-15

## 2024-05-15 NOTE — TELEPHONE ENCOUNTER
Refill Routing Note   Medication(s) are not appropriate for processing by [facility] for the following reason(s):        Required vitals outdated    [facility] action(s):  Defer               Appointments  past 12m or future 3m with PCP    Date Provider   Last Visit   2/23/2023 Avila Herman MD   Next Visit   Visit date not found Avila Herman MD   ED visits in past 90 days: 0        Note composed:2:03 AM 05/15/2024

## 2024-05-20 ENCOUNTER — PATIENT MESSAGE (OUTPATIENT)
Dept: OBSTETRICS AND GYNECOLOGY | Facility: CLINIC | Age: 34
End: 2024-05-20
Payer: MEDICAID

## 2024-05-24 ENCOUNTER — LAB VISIT (OUTPATIENT)
Dept: LAB | Facility: HOSPITAL | Age: 34
End: 2024-05-24
Attending: OBSTETRICS & GYNECOLOGY
Payer: MEDICAID

## 2024-05-24 ENCOUNTER — OFFICE VISIT (OUTPATIENT)
Dept: OBSTETRICS AND GYNECOLOGY | Facility: CLINIC | Age: 34
End: 2024-05-24
Payer: MEDICAID

## 2024-05-24 VITALS
HEIGHT: 68 IN | WEIGHT: 269.94 LBS | BODY MASS INDEX: 40.91 KG/M2 | DIASTOLIC BLOOD PRESSURE: 78 MMHG | SYSTOLIC BLOOD PRESSURE: 144 MMHG

## 2024-05-24 DIAGNOSIS — N91.1 SECONDARY AMENORRHEA: ICD-10-CM

## 2024-05-24 DIAGNOSIS — Z12.4 CERVICAL CANCER SCREENING: ICD-10-CM

## 2024-05-24 DIAGNOSIS — Z01.419 WELL WOMAN EXAM WITH ROUTINE GYNECOLOGICAL EXAM: Primary | ICD-10-CM

## 2024-05-24 DIAGNOSIS — Z32.01 POSITIVE PREGNANCY TEST: ICD-10-CM

## 2024-05-24 DIAGNOSIS — O10.919 CHRONIC HYPERTENSION DURING PREGNANCY, ANTEPARTUM: ICD-10-CM

## 2024-05-24 LAB
ABO + RH BLD: NORMAL
ALBUMIN SERPL BCP-MCNC: 2.9 G/DL (ref 3.5–5.2)
ALP SERPL-CCNC: 93 U/L (ref 55–135)
ALT SERPL W/O P-5'-P-CCNC: 6 U/L (ref 10–44)
ANION GAP SERPL CALC-SCNC: 7 MMOL/L (ref 8–16)
AST SERPL-CCNC: 9 U/L (ref 10–40)
B-HCG UR QL: POSITIVE
BASOPHILS # BLD AUTO: 0.03 K/UL (ref 0–0.2)
BASOPHILS NFR BLD: 0.4 % (ref 0–1.9)
BILIRUB SERPL-MCNC: 0.4 MG/DL (ref 0.1–1)
BLD GP AB SCN CELLS X3 SERPL QL: NORMAL
BUN SERPL-MCNC: 5 MG/DL (ref 6–20)
CALCIUM SERPL-MCNC: 9.3 MG/DL (ref 8.7–10.5)
CHLORIDE SERPL-SCNC: 105 MMOL/L (ref 95–110)
CO2 SERPL-SCNC: 23 MMOL/L (ref 23–29)
CREAT SERPL-MCNC: 0.6 MG/DL (ref 0.5–1.4)
CTP QC/QA: YES
DIFFERENTIAL METHOD BLD: ABNORMAL
EOSINOPHIL # BLD AUTO: 0.3 K/UL (ref 0–0.5)
EOSINOPHIL NFR BLD: 3.6 % (ref 0–8)
ERYTHROCYTE [DISTWIDTH] IN BLOOD BY AUTOMATED COUNT: 13.3 % (ref 11.5–14.5)
EST. GFR  (NO RACE VARIABLE): >60 ML/MIN/1.73 M^2
GLUCOSE SERPL-MCNC: 99 MG/DL (ref 70–110)
HCT VFR BLD AUTO: 28.7 % (ref 37–48.5)
HGB BLD-MCNC: 9.8 G/DL (ref 12–16)
IMM GRANULOCYTES # BLD AUTO: 0.05 K/UL (ref 0–0.04)
IMM GRANULOCYTES NFR BLD AUTO: 0.6 % (ref 0–0.5)
LYMPHOCYTES # BLD AUTO: 1.6 K/UL (ref 1–4.8)
LYMPHOCYTES NFR BLD: 20.3 % (ref 18–48)
MCH RBC QN AUTO: 29.4 PG (ref 27–31)
MCHC RBC AUTO-ENTMCNC: 34.1 G/DL (ref 32–36)
MCV RBC AUTO: 86 FL (ref 82–98)
MONOCYTES # BLD AUTO: 0.4 K/UL (ref 0.3–1)
MONOCYTES NFR BLD: 5.3 % (ref 4–15)
NEUTROPHILS # BLD AUTO: 5.5 K/UL (ref 1.8–7.7)
NEUTROPHILS NFR BLD: 69.8 % (ref 38–73)
NRBC BLD-RTO: 0 /100 WBC
PLATELET # BLD AUTO: 215 K/UL (ref 150–450)
PMV BLD AUTO: 10.6 FL (ref 9.2–12.9)
POTASSIUM SERPL-SCNC: 3.3 MMOL/L (ref 3.5–5.1)
PROT SERPL-MCNC: 6.9 G/DL (ref 6–8.4)
RBC # BLD AUTO: 3.33 M/UL (ref 4–5.4)
SODIUM SERPL-SCNC: 135 MMOL/L (ref 136–145)
SPECIMEN OUTDATE: NORMAL
WBC # BLD AUTO: 7.8 K/UL (ref 3.9–12.7)

## 2024-05-24 PROCEDURE — 87086 URINE CULTURE/COLONY COUNT: CPT | Performed by: OBSTETRICS & GYNECOLOGY

## 2024-05-24 PROCEDURE — 99459 PELVIC EXAMINATION: CPT | Mod: S$PBB,,, | Performed by: OBSTETRICS & GYNECOLOGY

## 2024-05-24 PROCEDURE — 99999PBSHW POCT URINE PREGNANCY: Mod: PBBFAC,,,

## 2024-05-24 PROCEDURE — 99999 PR PBB SHADOW E&M-EST. PATIENT-LVL II: CPT | Mod: PBBFAC,,, | Performed by: OBSTETRICS & GYNECOLOGY

## 2024-05-24 PROCEDURE — 1159F MED LIST DOCD IN RCRD: CPT | Mod: CPTII,,, | Performed by: OBSTETRICS & GYNECOLOGY

## 2024-05-24 PROCEDURE — 81025 URINE PREGNANCY TEST: CPT | Mod: PBBFAC | Performed by: OBSTETRICS & GYNECOLOGY

## 2024-05-24 PROCEDURE — 3078F DIAST BP <80 MM HG: CPT | Mod: CPTII,,, | Performed by: OBSTETRICS & GYNECOLOGY

## 2024-05-24 PROCEDURE — 86593 SYPHILIS TEST NON-TREP QUANT: CPT | Performed by: OBSTETRICS & GYNECOLOGY

## 2024-05-24 PROCEDURE — 99212 OFFICE O/P EST SF 10 MIN: CPT | Mod: PBBFAC,TH | Performed by: OBSTETRICS & GYNECOLOGY

## 2024-05-24 PROCEDURE — 87077 CULTURE AEROBIC IDENTIFY: CPT | Performed by: OBSTETRICS & GYNECOLOGY

## 2024-05-24 PROCEDURE — 87340 HEPATITIS B SURFACE AG IA: CPT | Performed by: OBSTETRICS & GYNECOLOGY

## 2024-05-24 PROCEDURE — 87389 HIV-1 AG W/HIV-1&-2 AB AG IA: CPT | Performed by: OBSTETRICS & GYNECOLOGY

## 2024-05-24 PROCEDURE — 36415 COLL VENOUS BLD VENIPUNCTURE: CPT | Performed by: OBSTETRICS & GYNECOLOGY

## 2024-05-24 PROCEDURE — 86762 RUBELLA ANTIBODY: CPT | Performed by: OBSTETRICS & GYNECOLOGY

## 2024-05-24 PROCEDURE — 85025 COMPLETE CBC W/AUTO DIFF WBC: CPT | Performed by: OBSTETRICS & GYNECOLOGY

## 2024-05-24 PROCEDURE — 87491 CHLMYD TRACH DNA AMP PROBE: CPT | Performed by: OBSTETRICS & GYNECOLOGY

## 2024-05-24 PROCEDURE — 99204 OFFICE O/P NEW MOD 45 MIN: CPT | Mod: S$PBB,TH,, | Performed by: OBSTETRICS & GYNECOLOGY

## 2024-05-24 PROCEDURE — 1160F RVW MEDS BY RX/DR IN RCRD: CPT | Mod: CPTII,,, | Performed by: OBSTETRICS & GYNECOLOGY

## 2024-05-24 PROCEDURE — 3077F SYST BP >= 140 MM HG: CPT | Mod: CPTII,,, | Performed by: OBSTETRICS & GYNECOLOGY

## 2024-05-24 PROCEDURE — 87088 URINE BACTERIA CULTURE: CPT | Performed by: OBSTETRICS & GYNECOLOGY

## 2024-05-24 PROCEDURE — 86803 HEPATITIS C AB TEST: CPT | Performed by: OBSTETRICS & GYNECOLOGY

## 2024-05-24 PROCEDURE — 3008F BODY MASS INDEX DOCD: CPT | Mod: CPTII,,, | Performed by: OBSTETRICS & GYNECOLOGY

## 2024-05-24 PROCEDURE — 99459 PELVIC EXAMINATION: CPT | Mod: PBBFAC | Performed by: OBSTETRICS & GYNECOLOGY

## 2024-05-24 PROCEDURE — 80053 COMPREHEN METABOLIC PANEL: CPT | Performed by: OBSTETRICS & GYNECOLOGY

## 2024-05-24 PROCEDURE — 87186 SC STD MICRODIL/AGAR DIL: CPT | Performed by: OBSTETRICS & GYNECOLOGY

## 2024-05-24 PROCEDURE — 83036 HEMOGLOBIN GLYCOSYLATED A1C: CPT | Performed by: OBSTETRICS & GYNECOLOGY

## 2024-05-24 PROCEDURE — 87591 N.GONORRHOEAE DNA AMP PROB: CPT | Performed by: OBSTETRICS & GYNECOLOGY

## 2024-05-24 PROCEDURE — 86901 BLOOD TYPING SEROLOGIC RH(D): CPT | Performed by: OBSTETRICS & GYNECOLOGY

## 2024-05-24 PROCEDURE — 87624 HPV HI-RISK TYP POOLED RSLT: CPT | Performed by: OBSTETRICS & GYNECOLOGY

## 2024-05-24 PROCEDURE — 88175 CYTOPATH C/V AUTO FLUID REDO: CPT | Performed by: OBSTETRICS & GYNECOLOGY

## 2024-05-24 RX ORDER — NIFEDIPINE 30 MG/1
30 TABLET, EXTENDED RELEASE ORAL DAILY
Qty: 90 TABLET | Refills: 3 | Status: SHIPPED | OUTPATIENT
Start: 2024-05-24

## 2024-05-24 NOTE — PROGRESS NOTES
[facility]  Ambulatory Clinic  Obstetrics & Gynecology    Visit Date:  2024     Chief Complaint:  Annual GYN exam, missed my period    History of Present Illness:      Lelo Allen is a 34 y.o. ,  UPT positive today, here with c/o missed period.    Patient's last menstrual period was 2023.      Pt has no major complaints today and denies any vaginal bleeding, discharge, pain, GI/ compliants.      Pregnancy complicated by:  Chronic hypertension  AMA  Asthma, mild intermittent   H/o preE  H/o c/section x 3  Sickle cell trait  Genital herpes    OB History    Para Term  AB Living   6 3 3 0 2 3   SAB IAB Ectopic Multiple Live Births   0 2 0 0 3      # Outcome Date GA Lbr Zack/2nd Weight Sex Type Anes PTL Lv   6 Current            5 Term 23 39w3d  3.49 kg (7 lb 11.1 oz) F CS-LTranv Spinal N CLARIBEL      Name: TAMIA,GIRL LELO SOLORZANO      Apgar1: 5  Apgar5: 9   4 Term 10/22/21 41w1d  3.459 kg (7 lb 10 oz) M CS-LTranv Spinal N CLARIBEL      Complications: Gestational hypertension without significant proteinuria during pregnancy in third trimester, antepartum   3 Term 06 39w0d  3.487 kg (7 lb 11 oz) M CS-LTranv  N CLARIBEL      Birth Comments: failed IOL, preE      Complications: Preeclampsia, third trimester   2 IAB            1 IAB               Obstetric Comments   Gynhx: reg   H/o genital herpes, chlamydia     PMH - chronic HTN, asthma, genital herpes with rare outbreak  PSH -  x 3, left ACL repair  MEDS - PNV  ALL - NKDA  SH - denies tobacco, etoh, drugs  FH - denies congenital defects, aneuploidy   GYN Hx - genital herpes, abn pap    Review of Systems:      Constitutional:  No fever, fatigue  HENT:  No congestion, hearing changes  Eyes:  No visual disturbance  Respiratory:  No cough, shortness of breath  Cardiovascular:  No chest pain, leg swelling  Breast:  No lump, pain, nipple discharge, redness, skin changes  Gastrointestinal:  No abdominal  "pain, constipation, blood in stool   Genitourinary:  No dysuria, frequency  Endocrine:  No heat or cold intolerance  Musculoskeletal:  No back pain, arthralgias  Skin:  No rash, jaundice  Neurological:  No dizziness, weakness, headaches  Psychiatric/Behavioral:  No sleep disturbance, dysphoric mood     Physical Exam:     BP (!) 144/78   Ht 5' 8" (1.727 m)   Wt 122.4 kg (269 lb 15.3 oz)   LMP 2023   BMI 41.05 kg/m²      GENERAL:  NAD. Well-nourished. A&Ox3.  HEENT:  NCAT, EOMI, moist mucus membranes.  Neck supple w/o masses.  BREAST:  Symmetric, no obvious masses, adenopathy, skin changes or nipple discharge.  LUNGS:  CTA-B.  HEART:  RRR, physiologic heart sounds.  ABDOMEN:  Soft, non-tender. Normoactive BS.  No obvious organomegaly.    EXT:  Symmetric w/o cramping, claudication, or edema. +2 distal pulses.   SKIN:  No rashes  NEURO:  Grossly intact bilaterally.   PSYCH:  Mood & affect appropriate.       PELVIC:  Female external genitalia w/o any obvious lesions.  Adequate perineal body. Normal urethral meatus. No gross lymphadenopathy.    Vagina:  Normal mucosa.  Good support.  No obvious lesion.  No discharge or bleeding.     Cervix:  No cervical motion tenderness, discharge, or obvious lesions.  Closed, thick, posterior.  Uterus:  ~24 weeks size, non-tender, normal contour.  Adnexa:  No masses or tenderness.    Rectal:  Deferred.  No obvious external lesions.     Chaperone present for exam.    Vscan shows single live IUP mid T2 in breech presentation, 's.    Assessment/Plan:    34 y.o. :    Well woman gynecologic exam    A gynecologic health assessment was performed with age appropriate counseling.    Secondary amenorrhea, UPT positive, LMP 23    We discussed principles of prenatal care, weight gain goals, dietary/lifestyle modifications, pregnancy care instructions and precautions.    Prenatal vitamins.    Pap smear, gonorrhea & chlamydia cx obtained.      Chronic hypertension  H/o " preE    Discuss with pt implications of uncontrolled HTN on her pregnancy.  Start procardia XL 30 mg qd.  Risks, benefits, and alternatives to procardia discussed.  Start home BP monitoring TID, parameters to call reviewed.  Notify MD if BP > 150/100.  Stop BP med if BP <100/60.  Discussed daily ASA therapy for prevention of preE.  Discussed anatenatal testing ~32 wks.  Timing of delivery will depend on BP control and maternal/fetal status.   testing as pregnancy progresses  Encourage healthy lifestyle modifications.  PreE precautions    AMA    We discussed aneuploidy screening options at this late gestational age including cell free DNA testing and genetic amnio.  Will order appropriate test pending dating US.  Pt states she would not terminate the pregnancy for any reasons.    H/o c/section x 3    Plan for repeat  delivery  Risks, benefits, and alternatives to  delivery discussed.  Discussed BTL as pregnancy progresses    Asthma, mild intermittent     Discussed effects of uncontrolled asthma on her pregnancy.    Continue albuterol MDI.    Not interested in doing peak flow.    Asthma action plan and parameters to call reviewed.    Asthma precautions.    Sickle cell trait    Dicussed SST  FOB status unknown  Order urine cx  UTI precautions    Genital herpes    Has rare outbreak  Discussed implications of genital herpes on pregnancy.    Valtrex prn.    Safe sex.      Return in 2 weeks for OB visit, or sooner prn.    All questions answered, pt voiced understanding.      Raz Banerjee MD

## 2024-05-25 LAB
ESTIMATED AVG GLUCOSE: 80 MG/DL (ref 68–131)
HBA1C MFR BLD: 4.4 % (ref 4–5.6)
HBV SURFACE AG SERPL QL IA: NORMAL
HCV AB SERPL QL IA: NORMAL
HIV 1+2 AB+HIV1 P24 AG SERPL QL IA: NORMAL
TREPONEMA PALLIDUM IGG+IGM AB [PRESENCE] IN SERUM OR PLASMA BY IMMUNOASSAY: NONREACTIVE

## 2024-05-26 DIAGNOSIS — O99.019 ANTEPARTUM ANEMIA: Primary | ICD-10-CM

## 2024-05-26 DIAGNOSIS — R82.71 ASYMPTOMATIC BACTERIURIA DURING PREGNANCY: Primary | ICD-10-CM

## 2024-05-26 DIAGNOSIS — O99.891 ASYMPTOMATIC BACTERIURIA DURING PREGNANCY: Primary | ICD-10-CM

## 2024-05-26 LAB
BACTERIA UR CULT: ABNORMAL
C TRACH DNA SPEC QL NAA+PROBE: NOT DETECTED
N GONORRHOEA DNA SPEC QL NAA+PROBE: NOT DETECTED

## 2024-05-26 RX ORDER — FERROUS SULFATE 325(65) MG
325 TABLET, DELAYED RELEASE (ENTERIC COATED) ORAL DAILY
Qty: 90 TABLET | Refills: 1 | Status: SHIPPED | OUTPATIENT
Start: 2024-05-26

## 2024-05-26 RX ORDER — NITROFURANTOIN 25; 75 MG/1; MG/1
100 CAPSULE ORAL 2 TIMES DAILY
Qty: 14 CAPSULE | Refills: 0 | Status: SHIPPED | OUTPATIENT
Start: 2024-05-26 | End: 2024-06-02

## 2024-05-27 LAB
RUBV IGG SER-ACNC: 18.4 IU/ML
RUBV IGG SER-IMP: REACTIVE

## 2024-05-29 LAB
FINAL PATHOLOGIC DIAGNOSIS: NORMAL
Lab: NORMAL

## 2024-05-30 ENCOUNTER — TELEPHONE (OUTPATIENT)
Dept: OBSTETRICS AND GYNECOLOGY | Facility: CLINIC | Age: 34
End: 2024-05-30
Payer: MEDICAID

## 2024-05-30 NOTE — TELEPHONE ENCOUNTER
Lvm to call for results.  ceftriaxone 250 mg IM injection   ----- Message from Raz Banerjee MD sent at 5/26/2024  6:00 PM CDT -----  Please notify pt her urine cx is ABNORMAL.  Please schedule pt RN visit for a ceftriaxone 250 mg IM injection here in the clinic  Advised pt to continue with macrobid 100 mg bid x 7 days after the IM injection.  Advise pt on UTI precautions.  Thanks.

## 2024-06-04 ENCOUNTER — PROCEDURE VISIT (OUTPATIENT)
Dept: MATERNAL FETAL MEDICINE | Facility: CLINIC | Age: 34
End: 2024-06-04
Payer: MEDICAID

## 2024-06-04 DIAGNOSIS — Z36.2 ENCOUNTER FOR FOLLOW-UP ULTRASOUND OF FETAL ANATOMY: Primary | ICD-10-CM

## 2024-06-04 DIAGNOSIS — Z32.01 POSITIVE PREGNANCY TEST: ICD-10-CM

## 2024-06-04 PROCEDURE — 76811 OB US DETAILED SNGL FETUS: CPT | Mod: PBBFAC | Performed by: OBSTETRICS & GYNECOLOGY

## 2024-06-07 ENCOUNTER — ROUTINE PRENATAL (OUTPATIENT)
Dept: OBSTETRICS AND GYNECOLOGY | Facility: CLINIC | Age: 34
End: 2024-06-07
Payer: MEDICAID

## 2024-06-07 VITALS — DIASTOLIC BLOOD PRESSURE: 86 MMHG | BODY MASS INDEX: 41.82 KG/M2 | WEIGHT: 275 LBS | SYSTOLIC BLOOD PRESSURE: 124 MMHG

## 2024-06-07 DIAGNOSIS — Z3A.29 29 WEEKS GESTATION OF PREGNANCY: Primary | ICD-10-CM

## 2024-06-07 PROCEDURE — 99212 OFFICE O/P EST SF 10 MIN: CPT | Mod: PBBFAC | Performed by: OBSTETRICS & GYNECOLOGY

## 2024-06-07 PROCEDURE — 99214 OFFICE O/P EST MOD 30 MIN: CPT | Mod: TH,S$PBB,, | Performed by: OBSTETRICS & GYNECOLOGY

## 2024-06-07 PROCEDURE — 99999 PR PBB SHADOW E&M-EST. PATIENT-LVL II: CPT | Mod: PBBFAC,,, | Performed by: OBSTETRICS & GYNECOLOGY

## 2024-06-07 NOTE — PROGRESS NOTES
34 y.o.  at 29w4d here for OB visit.    Pregnancy complicated by:  Chronic hypertension  AMA  Asthma, mild intermittent   H/o preE  H/o c/section x 3  Sickle cell trait  Genital herpes  Rh negative    Pt has no major complaints today.  Reports active fetus.  Denies vaginal bleeding, leakage of fluid, or contractions.    Pt seen [facility] for anatomy US on :    Impression   =========   A detailed fetal anatomic ultrasound examination was performed for the following high risk indication: AMA.   No fetal structural malformations are identified; however, fetal imaging is incomplete today.   A follow-up study will be scheduled to complete the fetal anatomic survey.   Fetal size today is consistent with established gestational age, as determined by the patient's LMP. If menstrual dating is not reliable, please contact [facility] so that this   report can be amended appropriately   Placental location is anterior without evidence of previa.     [facility] US findings d/w pt.  Pt has f/u US on .    Chronic hypertension  H/o preE    BP normal today, 124/86.   Discuss with pt implications of uncontrolled HTN on her pregnancy.  Continue procardia XL 30 mg qd.  Risks, benefits, and alternatives to procardia discussed.  Encourage home BP monitoring TID, parameters to call reviewed.  Notify MD if BP > 150/100.  Hold BP med if BP <100/60.  Discussed daily ASA therapy for prevention of preE.  Discussed anatenatal testing ~32 wks.  Timing of delivery will depend on BP control and maternal/fetal status.   testing as pregnancy progresses  Encourage healthy lifestyle modifications.  PreE precautions    AMA    We discussed aneuploidy screening options at this late gestational age including cell free DNA testing and genetic amnio.  Pt declined and states she would not terminate the pregnancy for any reasons.    H/o c/section x 3    Plan for repeat  delivery  Risks, benefits, and alternatives to  delivery  discussed.  Discussed BTL as pregnancy progresses    Asthma, mild intermittent     Discussed effects of uncontrolled asthma on her pregnancy.    Continue albuterol MDI.    Not interested in doing peak flow.    Asthma action plan and parameters to call reviewed.    Asthma precautions.    Sickle cell trait    Discussed SST  FOB status unknown  Pt was treated asymptomatic bacteriuria since last visit  Denies UTI sxs  Repeat urine cx next visit  UTI precautions    Genital herpes    Has rare outbreak  Discussed implications of genital herpes on pregnancy.    Valtrex prn.    Safe sex.      Rh negative    Order Rhogam workup    Encourage Tdap and RSV vaccine  Principles of prenatal care and precautions reviewed  Labor/preE precautions  Kick counts  Return in 2 weeks for OB visit, or sooner prn  All questions answered, pt voiced understanding      Raz Banerjee MD

## 2024-06-10 ENCOUNTER — PATIENT MESSAGE (OUTPATIENT)
Dept: OTHER | Facility: OTHER | Age: 34
End: 2024-06-10
Payer: MEDICAID

## 2024-06-11 ENCOUNTER — PATIENT MESSAGE (OUTPATIENT)
Dept: OBSTETRICS AND GYNECOLOGY | Facility: CLINIC | Age: 34
End: 2024-06-11
Payer: MEDICAID

## 2024-06-20 ENCOUNTER — TELEPHONE (OUTPATIENT)
Dept: OBSTETRICS AND GYNECOLOGY | Facility: CLINIC | Age: 34
End: 2024-06-20
Payer: MEDICAID

## 2024-06-20 NOTE — TELEPHONE ENCOUNTER
----- Message from Leonel Odom sent at 6/20/2024  9:49 AM CDT -----  Regarding: Self  [phone]  Type:  Patient Returning Call    Who Called:  Self     Who Left Message for Patient:  Linnette Kwan LPN    Does the patient know what this is regarding?: yes     Would the patient rather a call back or a response via [facility]?  Call back     Best Call Back Number: [phone]     Additional Information:     Thank you.

## 2024-06-21 ENCOUNTER — LAB VISIT (OUTPATIENT)
Dept: LAB | Facility: HOSPITAL | Age: 34
End: 2024-06-21
Attending: OBSTETRICS & GYNECOLOGY
Payer: MEDICAID

## 2024-06-21 ENCOUNTER — CLINICAL SUPPORT (OUTPATIENT)
Dept: OBSTETRICS AND GYNECOLOGY | Facility: CLINIC | Age: 34
End: 2024-06-21
Payer: MEDICAID

## 2024-06-21 DIAGNOSIS — O26.899 RH NEGATIVE STATE IN ANTEPARTUM PERIOD: Primary | ICD-10-CM

## 2024-06-21 DIAGNOSIS — Z3A.29 29 WEEKS GESTATION OF PREGNANCY: ICD-10-CM

## 2024-06-21 DIAGNOSIS — Z67.91 RH NEGATIVE STATE IN ANTEPARTUM PERIOD: Primary | ICD-10-CM

## 2024-06-21 LAB
ABO + RH BLD: NORMAL
BLD GP AB SCN CELLS X3 SERPL QL: NORMAL
SPECIMEN OUTDATE: NORMAL

## 2024-06-21 PROCEDURE — 86900 BLOOD TYPING SEROLOGIC ABO: CPT | Performed by: OBSTETRICS & GYNECOLOGY

## 2024-06-21 PROCEDURE — 36415 COLL VENOUS BLD VENIPUNCTURE: CPT | Performed by: OBSTETRICS & GYNECOLOGY

## 2024-06-21 PROCEDURE — 99999 PR PBB SHADOW E&M-EST. PATIENT-LVL I: CPT | Mod: PBBFAC,,,

## 2024-06-21 PROCEDURE — 86850 RBC ANTIBODY SCREEN: CPT | Performed by: OBSTETRICS & GYNECOLOGY

## 2024-06-21 NOTE — PROGRESS NOTES
Rhogam injection administered per MAR. Pt instructed to monitor for adverse reaction. Identification card provided to pt.

## 2024-06-24 ENCOUNTER — PATIENT MESSAGE (OUTPATIENT)
Dept: OTHER | Facility: OTHER | Age: 34
End: 2024-06-24
Payer: MEDICAID

## 2024-06-25 ENCOUNTER — ROUTINE PRENATAL (OUTPATIENT)
Dept: OBSTETRICS AND GYNECOLOGY | Facility: CLINIC | Age: 34
End: 2024-06-25
Payer: MEDICAID

## 2024-06-25 VITALS — DIASTOLIC BLOOD PRESSURE: 76 MMHG | SYSTOLIC BLOOD PRESSURE: 130 MMHG | WEIGHT: 276.88 LBS | BODY MASS INDEX: 42.1 KG/M2

## 2024-06-25 DIAGNOSIS — Z3A.32 32 WEEKS GESTATION OF PREGNANCY: Primary | ICD-10-CM

## 2024-06-25 DIAGNOSIS — O99.210 OBESITY IN PREGNANCY: ICD-10-CM

## 2024-06-25 DIAGNOSIS — O10.919 CHRONIC HYPERTENSION DURING PREGNANCY, ANTEPARTUM: ICD-10-CM

## 2024-06-25 DIAGNOSIS — O09.529 ANTEPARTUM MULTIGRAVIDA OF ADVANCED MATERNAL AGE: ICD-10-CM

## 2024-06-25 PROCEDURE — 99212 OFFICE O/P EST SF 10 MIN: CPT | Mod: PBBFAC,TH | Performed by: OBSTETRICS & GYNECOLOGY

## 2024-06-25 PROCEDURE — 99999 PR PBB SHADOW E&M-EST. PATIENT-LVL II: CPT | Mod: PBBFAC,,, | Performed by: OBSTETRICS & GYNECOLOGY

## 2024-06-25 NOTE — PROGRESS NOTES
34 y.o.  at 32w1d here for OB visit.    Pregnancy complicated by:  Chronic hypertension on procardia  AMA  Asthma, mild intermittent   H/o preE  H/o c/section x 3  Sickle cell trait  Genital herpes  Rh negative    No major complaints today.  Reports active fetus.  Denies vaginal bleeding, leakage of fluid, or contractions.    Pt has f/u MFM US on .  Start wkly NST this wk.  NST clinic messaged.    Chronic hypertension  H/o preE    Discuss with pt implications of uncontrolled HTN on her pregnancy.  Continue procardia XL 30 mg qd.  Risks, benefits, and alternatives to procardia discussed.  Encourage home BP monitoring TID, parameters to call reviewed.  Notify MD if BP > 150/100.  Hold BP med if BP <100/60.  Discussed daily ASA therapy for prevention of preE.  Discussed anatenatal testing ~32 wks.  Timing of delivery will depend on BP control and maternal/fetal status.   testing as pregnancy progresses  Encourage healthy lifestyle modifications.  PreE precautions    AMA    We discussed aneuploidy screening options at this late gestational age including cell free DNA testing and genetic amnio.  Pt declined and states she would not terminate the pregnancy for any reasons.    H/o c/section x 3    Plan for repeat  delivery  Risks, benefits, and alternatives to  delivery discussed.  Discussed BTL as pregnancy progresses    Asthma, mild intermittent     Discussed effects of uncontrolled asthma on her pregnancy.    Continue albuterol MDI.    Not interested in doing peak flow.    Asthma action plan and parameters to call reviewed.    Asthma precautions.    Sickle cell trait    Discussed SST  FOB status unknown  Pt was treated asymptomatic bacteriuria since last visit  Denies UTI sxs  Repeat urine cx next visit  UTI precautions    Genital herpes    Has rare outbreak  Discussed implications of genital herpes on pregnancy.    Valtrex prn.    Safe sex.      Rh negative    S/p Rhogam  6/21/24    Encourage Tdap and RSV vaccine  Labor/preE precautions  Kick counts  Return q1w or sooner prn  All questions answered, pt voiced understanding      Raz Banerjee MD

## 2024-06-26 ENCOUNTER — PATIENT MESSAGE (OUTPATIENT)
Dept: OBSTETRICS AND GYNECOLOGY | Facility: CLINIC | Age: 34
End: 2024-06-26
Payer: MEDICAID

## 2024-06-28 ENCOUNTER — CLINICAL SUPPORT (OUTPATIENT)
Dept: OBSTETRICS AND GYNECOLOGY | Facility: CLINIC | Age: 34
End: 2024-06-28
Payer: MEDICAID

## 2024-06-28 VITALS — DIASTOLIC BLOOD PRESSURE: 66 MMHG | SYSTOLIC BLOOD PRESSURE: 124 MMHG

## 2024-06-28 DIAGNOSIS — O10.919 CHRONIC HYPERTENSION DURING PREGNANCY, ANTEPARTUM: ICD-10-CM

## 2024-06-28 DIAGNOSIS — Z3A.32 32 WEEKS GESTATION OF PREGNANCY: ICD-10-CM

## 2024-06-28 NOTE — PROGRESS NOTES
NST completed in prenatal testing clinic. NST reactive and reassuring. Patient denies cxtn, leaking of fluid or vaginal bleeding. Patient reports + fetal movement.     Denies h/a, changes in vision and RUQ pain.        06/28/24 1352   Vital Signs   /66   Non Stress Test - General   Indications/Pt Reported Complaint chtn   Test Duration (min) 26 min   Number of Fetuses 1   Acoustic Stimulator No   Contractions Not present   Nonstress Test Baby A   Variability Moderate   Decels None   Accels Present   Baseline    Interpretation Baby A   Nonstress Test Interpretation Reactive   Overall Impression Reassuring

## 2024-07-03 ENCOUNTER — CLINICAL SUPPORT (OUTPATIENT)
Dept: OBSTETRICS AND GYNECOLOGY | Facility: CLINIC | Age: 34
End: 2024-07-03
Payer: MEDICAID

## 2024-07-03 ENCOUNTER — ROUTINE PRENATAL (OUTPATIENT)
Dept: OBSTETRICS AND GYNECOLOGY | Facility: CLINIC | Age: 34
End: 2024-07-03
Payer: MEDICAID

## 2024-07-03 VITALS
DIASTOLIC BLOOD PRESSURE: 86 MMHG | WEIGHT: 274.75 LBS | SYSTOLIC BLOOD PRESSURE: 126 MMHG | BODY MASS INDEX: 41.78 KG/M2

## 2024-07-03 DIAGNOSIS — Z3A.33 33 WEEKS GESTATION OF PREGNANCY: Primary | ICD-10-CM

## 2024-07-03 DIAGNOSIS — O10.919 CHRONIC HYPERTENSION DURING PREGNANCY, ANTEPARTUM: ICD-10-CM

## 2024-07-03 DIAGNOSIS — O09.529 ANTEPARTUM MULTIGRAVIDA OF ADVANCED MATERNAL AGE: ICD-10-CM

## 2024-07-03 DIAGNOSIS — O99.210 OBESITY IN PREGNANCY: ICD-10-CM

## 2024-07-03 PROCEDURE — 59025 FETAL NON-STRESS TEST: CPT | Mod: PBBFAC | Performed by: OBSTETRICS & GYNECOLOGY

## 2024-07-03 PROCEDURE — 99213 OFFICE O/P EST LOW 20 MIN: CPT | Mod: PBBFAC,TH | Performed by: OBSTETRICS & GYNECOLOGY

## 2024-07-03 PROCEDURE — 99999 PR PBB SHADOW E&M-EST. PATIENT-LVL III: CPT | Mod: PBBFAC,,, | Performed by: OBSTETRICS & GYNECOLOGY

## 2024-07-03 NOTE — PROGRESS NOTES
34 y.o.  at 33w2d here for OB visit.    Pregnancy complicated by:  Chronic hypertension on procardia  AMA  Asthma, mild intermittent   H/o preE  H/o c/section x 3  Sickle cell trait  Genital herpes  Rh negative    Pt has no major complaints today.  Reports active fetus.  Denies vaginal bleeding, leakage of fluid, or contractions.    Indications for NST:  chronic hypertension on medication  NST: Baseline 140's, moderate variability, positive acceleration, no decelerations. Rachel: No contraction.  Monitored ~20 mins.  MVP: ~6 cm  Continue wkly NST, wkly MVP until delivery.  Pt has f/u MFM US on .    Chronic hypertension  H/o preE    Discuss with pt implications of uncontrolled HTN on her pregnancy.  Continue procardia XL 30 mg qd.  Risks, benefits, and alternatives to procardia discussed.  Encourage home BP monitoring TID, parameters to call reviewed.  Notify MD if BP > 150/100.  Hold BP med if BP <100/60.  Discussed daily ASA therapy for prevention of preE.  Discussed anatenatal testing ~32 wks.  Timing of delivery will depend on BP control and maternal/fetal status.   testing as pregnancy progresses  Encourage healthy lifestyle modifications.  PreE precautions    AMA    We discussed aneuploidy screening options at this late gestational age including cell free DNA testing and genetic amnio.  Pt declined and states she would not terminate the pregnancy for any reasons.    H/o c/section x 3    Plan for repeat  delivery  Risks, benefits, and alternatives to  delivery discussed.  Discussed BTL as pregnancy progresses    Asthma, mild intermittent     Discussed effects of uncontrolled asthma on her pregnancy.    Continue albuterol MDI.    Not interested in doing peak flow.    Asthma action plan and parameters to call reviewed.    Asthma precautions.    Sickle cell trait    Discussed SST  FOB status unknown  Pt was treated asymptomatic bacteriuria since last visit  Denies UTI sxs  Repeat  urine cx next visit  UTI precautions    Genital herpes    Has rare outbreak  Discussed implications of genital herpes on pregnancy.    Valtrex prn.    Safe sex.      Rh negative    S/p Rhogam 6/21/24    Encourage Tdap and RSV vaccine  Labor/preE precautions  Kick counts  Return q1w or sooner prn  All questions answered, pt voiced understanding      Raz Banerjee MD

## 2024-07-03 NOTE — PROGRESS NOTES
NST completed in prenatal testing clinic. NST reactive and reassuring. Patient denies cxtn, leaking of fluid or vaginal bleeding. Patient reports + fetal movement.        07/03/24 1114   Non Stress Test - General   Indications/Pt Reported Complaint chtn   Test Duration (min) 21 min   Number of Fetuses 1   Acoustic Stimulator No   Contractions Not present   Nonstress Test Baby A   Variability Moderate   Decels None   Accels Present   Baseline    Interpretation Baby A   Nonstress Test Interpretation Reactive   Overall Impression Reassuring

## 2024-07-09 ENCOUNTER — PROCEDURE VISIT (OUTPATIENT)
Dept: MATERNAL FETAL MEDICINE | Facility: CLINIC | Age: 34
End: 2024-07-09
Payer: MEDICAID

## 2024-07-09 DIAGNOSIS — Z36.2 ENCOUNTER FOR FOLLOW-UP ULTRASOUND OF FETAL ANATOMY: ICD-10-CM

## 2024-07-09 PROCEDURE — 76816 OB US FOLLOW-UP PER FETUS: CPT | Mod: PBBFAC | Performed by: STUDENT IN AN ORGANIZED HEALTH CARE EDUCATION/TRAINING PROGRAM

## 2024-07-10 ENCOUNTER — PATIENT MESSAGE (OUTPATIENT)
Dept: OBSTETRICS AND GYNECOLOGY | Facility: CLINIC | Age: 34
End: 2024-07-10
Payer: MEDICAID

## 2024-07-11 ENCOUNTER — ROUTINE PRENATAL (OUTPATIENT)
Dept: OBSTETRICS AND GYNECOLOGY | Facility: CLINIC | Age: 34
End: 2024-07-11
Payer: MEDICAID

## 2024-07-11 ENCOUNTER — CLINICAL SUPPORT (OUTPATIENT)
Dept: OBSTETRICS AND GYNECOLOGY | Facility: CLINIC | Age: 34
End: 2024-07-11
Payer: MEDICAID

## 2024-07-11 VITALS
BODY MASS INDEX: 42.19 KG/M2 | SYSTOLIC BLOOD PRESSURE: 126 MMHG | WEIGHT: 277.44 LBS | DIASTOLIC BLOOD PRESSURE: 88 MMHG

## 2024-07-11 DIAGNOSIS — O10.919 CHRONIC HYPERTENSION DURING PREGNANCY, ANTEPARTUM: ICD-10-CM

## 2024-07-11 DIAGNOSIS — O09.93 SUPERVISION OF HIGH RISK PREGNANCY IN THIRD TRIMESTER: Primary | ICD-10-CM

## 2024-07-11 DIAGNOSIS — Z3A.34 34 WEEKS GESTATION OF PREGNANCY: Primary | ICD-10-CM

## 2024-07-11 DIAGNOSIS — Z3A.34 34 WEEKS GESTATION OF PREGNANCY: ICD-10-CM

## 2024-07-11 PROCEDURE — 99999 PR PBB SHADOW E&M-EST. PATIENT-LVL II: CPT | Mod: PBBFAC,,, | Performed by: STUDENT IN AN ORGANIZED HEALTH CARE EDUCATION/TRAINING PROGRAM

## 2024-07-11 PROCEDURE — 99212 OFFICE O/P EST SF 10 MIN: CPT | Mod: PBBFAC,TH | Performed by: STUDENT IN AN ORGANIZED HEALTH CARE EDUCATION/TRAINING PROGRAM

## 2024-07-11 PROCEDURE — 99213 OFFICE O/P EST LOW 20 MIN: CPT | Mod: TH,S$PBB,, | Performed by: STUDENT IN AN ORGANIZED HEALTH CARE EDUCATION/TRAINING PROGRAM

## 2024-07-11 NOTE — PROGRESS NOTES
NST completed in prenatal testing clinic. NST reactive and reassuring. Patient denies cxtn, leaking of fluid or vaginal bleeding. Patient reports + fetal movement.        07/11/24 1130   Non Stress Test - General   Indications/Pt Reported Complaint chtn,obesity   Test Duration (min) 21 min   Number of Fetuses 1   Acoustic Stimulator No   Contractions Not present   Nonstress Test Baby A   Variability Moderate   Decels None   Accels Present   Baseline    Interpretation Baby A   Nonstress Test Interpretation Reactive   Overall Impression Reassuring

## 2024-07-11 NOTE — PROGRESS NOTES
35 yo  at 34w3d presents for OB follow up    No issues today, feeling FM. Denies VB or LOF  Had NST earlier, states it was good    Vitals  BP: 126/88  Weight: 125.8 kg (277 lb 7.2 oz)  Prenatal  Fundal Height (cm): 34 cm  Fetal Heart Rate: 130  Movement: Present    Assessment & Plan   - Routine prenatal care    Total time spent on visit was 20 minutes.  This includes face to face time and non-face to face time preparing to see the patient (eg, review of tests), Obtaining and/or reviewing separately obtained history, Documenting clinical information in the electronic or other health record, Independently interpreting resultsand communicating results to the patient/family/caregiver, or Care coordination.

## 2024-07-15 ENCOUNTER — PATIENT MESSAGE (OUTPATIENT)
Dept: OTHER | Facility: OTHER | Age: 34
End: 2024-07-15
Payer: MEDICAID

## 2024-07-24 ENCOUNTER — CLINICAL SUPPORT (OUTPATIENT)
Dept: OBSTETRICS AND GYNECOLOGY | Facility: CLINIC | Age: 34
End: 2024-07-24
Payer: MEDICAID

## 2024-07-24 ENCOUNTER — ROUTINE PRENATAL (OUTPATIENT)
Dept: OBSTETRICS AND GYNECOLOGY | Facility: CLINIC | Age: 34
End: 2024-07-24
Payer: MEDICAID

## 2024-07-24 VITALS
DIASTOLIC BLOOD PRESSURE: 86 MMHG | BODY MASS INDEX: 41.95 KG/M2 | SYSTOLIC BLOOD PRESSURE: 132 MMHG | WEIGHT: 275.88 LBS

## 2024-07-24 DIAGNOSIS — O10.919 CHRONIC HYPERTENSION DURING PREGNANCY, ANTEPARTUM: ICD-10-CM

## 2024-07-24 DIAGNOSIS — O99.210 OBESITY IN PREGNANCY: ICD-10-CM

## 2024-07-24 DIAGNOSIS — O09.529 ANTEPARTUM MULTIGRAVIDA OF ADVANCED MATERNAL AGE: ICD-10-CM

## 2024-07-24 DIAGNOSIS — Z3A.36 36 WEEKS GESTATION OF PREGNANCY: Primary | ICD-10-CM

## 2024-07-24 PROCEDURE — 99212 OFFICE O/P EST SF 10 MIN: CPT | Mod: PBBFAC,TH | Performed by: OBSTETRICS & GYNECOLOGY

## 2024-07-24 PROCEDURE — 87653 STREP B DNA AMP PROBE: CPT | Performed by: OBSTETRICS & GYNECOLOGY

## 2024-07-24 PROCEDURE — 59025 FETAL NON-STRESS TEST: CPT | Mod: PBBFAC | Performed by: OBSTETRICS & GYNECOLOGY

## 2024-07-24 PROCEDURE — 99999 PR PBB SHADOW E&M-EST. PATIENT-LVL II: CPT | Mod: PBBFAC,,, | Performed by: OBSTETRICS & GYNECOLOGY

## 2024-07-24 NOTE — PROGRESS NOTES
34 y.o.  at 36w2d here for OB visit.    Pregnancy complicated by:  Chronic hypertension on procardia  AMA  Asthma, mild intermittent   H/o preE  H/o c/section x 3  Sickle cell trait  Genital herpes  Rh negative    No major complaints today.  Reports active fetus.  Denies vaginal bleeding, leakage of fluid, or contractions.    Indications for NST:  chronic hypertension on medication  NST: Baseline 130's, moderate variability, positive acceleration, no decelerations. [place]: No contraction.  Monitored ~20 mins.  MVP: ~5 cm  Continue wkly NST until delivery.    Chronic hypertension  H/o preE    Discuss with pt implications of uncontrolled HTN on her pregnancy.  Continue procardia XL 30 mg qd.  Risks, benefits, and alternatives to procardia discussed.  Encourage home BP monitoring TID, parameters to call reviewed.  Notify MD if BP > 150/100.  Hold BP med if BP <100/60.  Discussed daily ASA therapy for prevention of preE.  Discussed anatenatal testing ~32 wks.  Timing of delivery will depend on BP control and maternal/fetal status.   testing as pregnancy progresses  Encourage healthy lifestyle modifications.  PreE precautions    AMA    We discussed aneuploidy screening options at this late gestational age including cell free DNA testing and genetic amnio.  Pt declined and states she would not terminate the pregnancy for any reasons.    H/o c/section x 3    Plan for repeat  delivery  Risks, benefits, and alternatives to  delivery discussed.  Pt declined BTL.  Delivery consent signed.    Asthma, mild intermittent     Discussed effects of uncontrolled asthma on her pregnancy.    Continue albuterol MDI.    Not interested in doing peak flow.    Asthma action plan and parameters to call reviewed.    Asthma precautions.    Sickle cell trait    Discussed SST  FOB status unknown  Pt was treated asymptomatic bacteriuria since last visit  Denies UTI sxs  Repeat urine cx next visit  UTI  precautions    Genital herpes    Has rare outbreak  Discussed implications of genital herpes on pregnancy.    Valtrex prn.    Safe sex.      Rh negative    S/p Rhogam 6/21/24    GBS obtained today.  Encourage Tdap and RSV vaccine  Labor/preE precautions  Kick counts  Return q1w or sooner prn with Dr. Petersen  All questions answered, pt voiced understanding      Raz Banerjee MD

## 2024-07-24 NOTE — PROGRESS NOTES
NST completed in prenatal testing clinic. NST reactive and reassuring. Patient denies cxtn, leaking of fluid or vaginal bleeding. Patient reports + fetal movement.        07/24/24 1140   Non Stress Test - General   Indications/Pt Reported Complaint chtn, obestiy   Test Duration (min) 22 min   Number of Fetuses 1   Acoustic Stimulator No   Contractions Irregular   Nonstress Test Baby A   Variability Moderate   Decels None   Accels Present   Baseline    Interpretation Baby A   Nonstress Test Interpretation Reactive   Overall Impression Reassuring

## 2024-07-26 LAB — GROUP B STREPTOCOCCUS, PCR: NEGATIVE

## 2024-07-30 ENCOUNTER — CLINICAL SUPPORT (OUTPATIENT)
Dept: OBSTETRICS AND GYNECOLOGY | Facility: CLINIC | Age: 34
End: 2024-07-30
Payer: MEDICAID

## 2024-07-30 ENCOUNTER — ROUTINE PRENATAL (OUTPATIENT)
Dept: OBSTETRICS AND GYNECOLOGY | Facility: CLINIC | Age: 34
End: 2024-07-30
Payer: MEDICAID

## 2024-07-30 VITALS
BODY MASS INDEX: 43.21 KG/M2 | DIASTOLIC BLOOD PRESSURE: 80 MMHG | SYSTOLIC BLOOD PRESSURE: 140 MMHG | WEIGHT: 284.19 LBS

## 2024-07-30 DIAGNOSIS — O26.899 PELVIC PAIN IN PREGNANCY: ICD-10-CM

## 2024-07-30 DIAGNOSIS — B00.9 HERPES SIMPLEX TYPE 2 (HSV-2) INFECTION AFFECTING PREGNANCY, ANTEPARTUM: ICD-10-CM

## 2024-07-30 DIAGNOSIS — R10.2 PELVIC PAIN IN PREGNANCY: ICD-10-CM

## 2024-07-30 DIAGNOSIS — O09.90 SUPERVISION OF HIGH RISK PREGNANCY, ANTEPARTUM: Primary | ICD-10-CM

## 2024-07-30 DIAGNOSIS — O98.519 HERPES SIMPLEX TYPE 2 (HSV-2) INFECTION AFFECTING PREGNANCY, ANTEPARTUM: ICD-10-CM

## 2024-07-30 DIAGNOSIS — O10.919 CHRONIC HYPERTENSION DURING PREGNANCY, ANTEPARTUM: ICD-10-CM

## 2024-07-30 DIAGNOSIS — Z3A.37 37 WEEKS GESTATION OF PREGNANCY: Primary | ICD-10-CM

## 2024-07-30 DIAGNOSIS — Z98.891 HISTORY OF CESAREAN SECTION: ICD-10-CM

## 2024-07-30 PROCEDURE — 99213 OFFICE O/P EST LOW 20 MIN: CPT | Mod: TH,S$PBB,, | Performed by: OBSTETRICS & GYNECOLOGY

## 2024-07-30 PROCEDURE — 99212 OFFICE O/P EST SF 10 MIN: CPT | Mod: PBBFAC,TH | Performed by: OBSTETRICS & GYNECOLOGY

## 2024-07-30 PROCEDURE — 99999 PR PBB SHADOW E&M-EST. PATIENT-LVL II: CPT | Mod: PBBFAC,,, | Performed by: OBSTETRICS & GYNECOLOGY

## 2024-07-30 RX ORDER — VALACYCLOVIR HYDROCHLORIDE 500 MG/1
500 TABLET, FILM COATED ORAL 2 TIMES DAILY
Qty: 60 TABLET | Refills: 11 | Status: SHIPPED | OUTPATIENT
Start: 2024-07-30 | End: 2025-07-30

## 2024-07-30 NOTE — PROGRESS NOTES
NST completed in prenatal testing clinic. NST reactive and reassuring. Patient denies regular/painful cxtn, leaking of fluid or vaginal bleeding. Patient reports + fetal movement.        07/30/24 1133   Non Stress Test - General   Indications/Pt Reported Complaint chtn, obesity   Test Duration (min) 34 min   Number of Fetuses 1   Acoustic Stimulator No   Contractions Not present   Nonstress Test Baby A   Variability Moderate   Decels Variable   Accels Present   Baseline FHR   (130-135 bpm)   Interpretation Baby A   Nonstress Test Interpretation Reactive   Overall Impression Reassuring

## 2024-07-30 NOTE — PROGRESS NOTES
"Complaints today:Ms. Allen reports that she is doing well. She has some pelvic pain especially when lying down. Normal fetal movements with no vaginal bleeding, LOF or contractions at this time.     BP (!) 140/80   Wt 128.9 kg (284 lb 2.8 oz)   LMP 2023   BMI 43.21 kg/m²     34 y.o., at 37w1d by Estimated Date of Delivery: 24  Patient Active Problem List   Diagnosis    Mild intermittent asthma without complication    HSV (herpes simplex virus) anogenital infection    Sickle cell trait; FOB needs screening    Marijuana abuse in remission    Gestational hypertension, antepartum    S/P  section    Advanced maternal age in multigravida, second trimester    Supervision of high risk pregnancy, antepartum    Status post  section    Wound dehiscence,      section wound seroma, postpartum    History of  section    Chronic hypertension during pregnancy, antepartum     OB History    Para Term  AB Living   6 3 3   2 3   SAB IAB Ectopic Multiple Live Births     2   0 3      # Outcome Date GA Lbr Zack/2nd Weight Sex Type Anes PTL Lv   6 Current            5 Term 23 39w3d  3.49 kg (7 lb 11.1 oz) F CS-LTranv Spinal N CLARIBEL   4 Term 10/22/21 41w1d  3.459 kg (7 lb 10 oz) M CS-LTranv Spinal N CLARIBEL      Complications: Gestational hypertension without significant proteinuria during pregnancy in third trimester, antepartum   3 Term 06 39w0d  3.487 kg (7 lb 11 oz) M CS-LTranv  N CLARIBEL      Birth Comments: failed IOL, preE      Complications: Preeclampsia, third trimester   2 IAB            1 IAB               Obstetric Comments   Gynhx: reg   H/o genital herpes, chlamydia       Dating reviewed    Allergies and problem list reviewed and updated    Medical and surgical history reviewed    Prenatal labs reviewed and updated    Physical Exam:  ABD: soft, gravid, nontender, 38cm    Assessment:  Ole [place]" was seen today for routine prenatal " visit.    Diagnoses and all orders for this visit:    Supervision of high risk pregnancy, antepartum  - Doing well  - TDAP declined  - MFM US reviewed    History of  section  - Discussed RLTCS the week of     Chronic hypertension during pregnancy, antepartum  -     Protein/Creatinine Ratio, Urine    Pelvic pain in pregnancy  - Discussed sleeping positions    Herpes simplex type 2 (HSV-2) infection affecting pregnancy, antepartum  -     valACYclovir (VALTREX) 500 MG tablet; Take 1 tablet (500 mg total) by mouth 2 (two) times daily.        Orders Placed This Encounter   Procedures    Protein/Creatinine Ratio, Urine       Follow up in about 1 week (around 2024) for Routine OB.

## 2024-08-07 ENCOUNTER — PATIENT MESSAGE (OUTPATIENT)
Dept: OBSTETRICS AND GYNECOLOGY | Facility: CLINIC | Age: 34
End: 2024-08-07

## 2024-08-07 ENCOUNTER — ROUTINE PRENATAL (OUTPATIENT)
Dept: OBSTETRICS AND GYNECOLOGY | Facility: CLINIC | Age: 34
End: 2024-08-07
Payer: MEDICAID

## 2024-08-07 ENCOUNTER — CLINICAL SUPPORT (OUTPATIENT)
Dept: OBSTETRICS AND GYNECOLOGY | Facility: CLINIC | Age: 34
End: 2024-08-07
Payer: MEDICAID

## 2024-08-07 VITALS — SYSTOLIC BLOOD PRESSURE: 126 MMHG | WEIGHT: 280 LBS | BODY MASS INDEX: 42.57 KG/M2 | DIASTOLIC BLOOD PRESSURE: 88 MMHG

## 2024-08-07 DIAGNOSIS — Z3A.38 38 WEEKS GESTATION OF PREGNANCY: Primary | ICD-10-CM

## 2024-08-07 DIAGNOSIS — O10.919 CHRONIC HYPERTENSION DURING PREGNANCY, ANTEPARTUM: ICD-10-CM

## 2024-08-07 PROCEDURE — 99212 OFFICE O/P EST SF 10 MIN: CPT | Mod: PBBFAC,TH | Performed by: PHYSICIAN ASSISTANT

## 2024-08-07 PROCEDURE — 99999 PR PBB SHADOW E&M-EST. PATIENT-LVL II: CPT | Mod: PBBFAC,,, | Performed by: PHYSICIAN ASSISTANT

## 2024-08-07 PROCEDURE — 99213 OFFICE O/P EST LOW 20 MIN: CPT | Mod: TH,S$PBB,, | Performed by: PHYSICIAN ASSISTANT

## 2024-08-09 ENCOUNTER — TELEPHONE (OUTPATIENT)
Dept: OBSTETRICS AND GYNECOLOGY | Facility: CLINIC | Age: 34
End: 2024-08-09
Payer: MEDICAID

## 2024-08-09 ENCOUNTER — PATIENT MESSAGE (OUTPATIENT)
Dept: OBSTETRICS AND GYNECOLOGY | Facility: CLINIC | Age: 34
End: 2024-08-09
Payer: MEDICAID

## 2024-08-09 DIAGNOSIS — O99.513 ASTHMA AFFECTING PREGNANCY IN THIRD TRIMESTER: ICD-10-CM

## 2024-08-09 DIAGNOSIS — J45.909 ASTHMA AFFECTING PREGNANCY IN THIRD TRIMESTER: ICD-10-CM

## 2024-08-09 RX ORDER — NIRMATRELVIR AND RITONAVIR 300-100 MG
KIT ORAL
Qty: 30 TABLET | Refills: 0 | Status: SHIPPED | OUTPATIENT
Start: 2024-08-09 | End: 2024-08-14

## 2024-08-09 RX ORDER — ALBUTEROL SULFATE 90 UG/1
2 INHALANT RESPIRATORY (INHALATION) EVERY 6 HOURS PRN
Qty: 6.7 G | Refills: 0 | Status: SHIPPED | OUTPATIENT
Start: 2024-08-09

## 2024-08-14 ENCOUNTER — ROUTINE PRENATAL (OUTPATIENT)
Dept: OBSTETRICS AND GYNECOLOGY | Facility: CLINIC | Age: 34
End: 2024-08-14
Payer: MEDICAID

## 2024-08-14 VITALS — DIASTOLIC BLOOD PRESSURE: 78 MMHG | WEIGHT: 275 LBS | BODY MASS INDEX: 41.81 KG/M2 | SYSTOLIC BLOOD PRESSURE: 140 MMHG

## 2024-08-14 DIAGNOSIS — O09.90 SUPERVISION OF HIGH RISK PREGNANCY, ANTEPARTUM: Primary | ICD-10-CM

## 2024-08-14 DIAGNOSIS — Z98.891 HISTORY OF CESAREAN SECTION: ICD-10-CM

## 2024-08-14 DIAGNOSIS — Z98.891 HISTORY OF 2 CESAREAN SECTIONS: ICD-10-CM

## 2024-08-14 DIAGNOSIS — O99.513 ASTHMA AFFECTING PREGNANCY IN THIRD TRIMESTER: ICD-10-CM

## 2024-08-14 DIAGNOSIS — O10.919 CHRONIC HYPERTENSION DURING PREGNANCY, ANTEPARTUM: ICD-10-CM

## 2024-08-14 DIAGNOSIS — J45.909 ASTHMA AFFECTING PREGNANCY IN THIRD TRIMESTER: ICD-10-CM

## 2024-08-14 PROCEDURE — 99999 PR PBB SHADOW E&M-EST. PATIENT-LVL III: CPT | Mod: PBBFAC,,, | Performed by: OBSTETRICS & GYNECOLOGY

## 2024-08-14 PROCEDURE — 99213 OFFICE O/P EST LOW 20 MIN: CPT | Mod: PBBFAC,TH | Performed by: OBSTETRICS & GYNECOLOGY

## 2024-08-14 PROCEDURE — 99213 OFFICE O/P EST LOW 20 MIN: CPT | Mod: TH,S$PBB,, | Performed by: OBSTETRICS & GYNECOLOGY

## 2024-08-14 RX ORDER — SODIUM CITRATE AND CITRIC ACID MONOHYDRATE 334; 500 MG/5ML; MG/5ML
30 SOLUTION ORAL
Status: CANCELLED | OUTPATIENT
Start: 2024-08-14

## 2024-08-14 RX ORDER — ALBUTEROL SULFATE 0.83 MG/ML
2.5 SOLUTION RESPIRATORY (INHALATION) EVERY 8 HOURS PRN
Qty: 270 ML | Refills: 0 | Status: ON HOLD | OUTPATIENT
Start: 2024-08-14 | End: 2024-09-13

## 2024-08-14 RX ORDER — OXYTOCIN-SODIUM CHLORIDE 0.9% IV SOLN 30 UNIT/500ML 30-0.9/5 UT/ML-%
95 SOLUTION INTRAVENOUS ONCE
Status: CANCELLED | OUTPATIENT
Start: 2024-08-14 | End: 2024-08-14

## 2024-08-14 RX ORDER — CARBOPROST TROMETHAMINE 250 UG/ML
250 INJECTION, SOLUTION INTRAMUSCULAR
Status: CANCELLED | OUTPATIENT
Start: 2024-08-14

## 2024-08-14 RX ORDER — MUPIROCIN 20 MG/G
OINTMENT TOPICAL
Status: CANCELLED | OUTPATIENT
Start: 2024-08-14

## 2024-08-14 RX ORDER — SODIUM CHLORIDE, SODIUM LACTATE, POTASSIUM CHLORIDE, CALCIUM CHLORIDE 600; 310; 30; 20 MG/100ML; MG/100ML; MG/100ML; MG/100ML
INJECTION, SOLUTION INTRAVENOUS CONTINUOUS
Status: CANCELLED | OUTPATIENT
Start: 2024-08-14

## 2024-08-14 RX ORDER — OXYTOCIN-SODIUM CHLORIDE 0.9% IV SOLN 30 UNIT/500ML 30-0.9/5 UT/ML-%
10 SOLUTION INTRAVENOUS ONCE
Status: CANCELLED | OUTPATIENT
Start: 2024-08-14 | End: 2024-08-14

## 2024-08-14 RX ORDER — METHYLERGONOVINE MALEATE 0.2 MG/ML
200 INJECTION INTRAVENOUS
Status: CANCELLED | OUTPATIENT
Start: 2024-08-14

## 2024-08-14 RX ORDER — FAMOTIDINE 10 MG/ML
20 INJECTION INTRAVENOUS
Status: CANCELLED | OUTPATIENT
Start: 2024-08-14

## 2024-08-14 RX ORDER — MISOPROSTOL 200 UG/1
800 TABLET ORAL
Status: CANCELLED | OUTPATIENT
Start: 2024-08-14

## 2024-08-14 NOTE — PROGRESS NOTES
"Complaints today:Ms. Allen reports that she is doing well with no complaints. Normal fetal movements with no vaginal bleeding, LOF or contractions at this time.       BP (!) 140/78   Wt 124.7 kg (275 lb)   LMP 2023   BMI 41.81 kg/m²     34 y.o., at 39w2d by Estimated Date of Delivery: 24  Patient Active Problem List   Diagnosis    Mild intermittent asthma without complication    HSV (herpes simplex virus) anogenital infection    Sickle cell trait; FOB needs screening    Marijuana abuse in remission    Gestational hypertension, antepartum    S/P  section    Advanced maternal age in multigravida, second trimester    Supervision of high risk pregnancy, antepartum    Status post  section    Wound dehiscence,      section wound seroma, postpartum    History of  section    Chronic hypertension during pregnancy, antepartum     OB History    Para Term  AB Living   6 3 3   2 3   SAB IAB Ectopic Multiple Live Births     2   0 3      # Outcome Date GA Lbr Zack/2nd Weight Sex Type Anes PTL Lv   6 Current            5 Term 23 39w3d  3.49 kg (7 lb 11.1 oz) F CS-LTranv Spinal N CLARIBEL   4 Term 10/22/21 41w1d  3.459 kg (7 lb 10 oz) M CS-LTranv Spinal N CLARIBEL      Complications: Gestational hypertension without significant proteinuria during pregnancy in third trimester, antepartum   3 Term 06 39w0d  3.487 kg (7 lb 11 oz) M CS-LTranv  N CLARIBEL      Birth Comments: failed IOL, preE      Complications: Preeclampsia, third trimester   2 IAB            1 IAB               Obstetric Comments   Gynhx: reg   H/o genital herpes, chlamydia       Dating reviewed    Allergies and problem list reviewed and updated    Medical and surgical history reviewed    Prenatal labs reviewed and updated    Physical Exam:  ABD: soft, gravid, nontender, 39    Assessment:  Ole Saavedra"Ole" was seen today for routine prenatal visit.    Diagnoses and all orders for this " visit:    Supervision of high risk pregnancy, antepartum    Chronic hypertension during pregnancy, antepartum    History of  section  -     Admit to Inpatient; Standing  -     Vital signs; Standing  -     Verify informed consent; Standing  -     Fetal monitoring WITH contraction monitoring; Standing  -     Dugan to Gravity; Standing  -     Insert peripheral IV; Standing  -     Chlorhexidine (CHG) 2% Wipes; Standing  -     Clip and Prep Suprapubic; Standing  -     Notify NICU to attend birth; Standing  -     Notify Physician; Standing  -     Diet NPO; Standing  -     miSOPROStoL tablet 800 mcg  -     Chlorohexidine Gluconate Bath; Standing  -     CBC auto differential; Standing  -     POCT Cord Blood Gas Umbilical Artery Cord Gas; Standing  -     POCT Cord Blood Gas Umbilical Vein Cord Gas; Standing  -     Type & Screen, Labor & Delivery; Standing  -     Inpatient consult to Anesthesiology; Standing  -     Full code; Standing  -     Place JEANNE hose; Standing  -     Place sequential compression device; Standing    History of 2  sections    Asthma affecting pregnancy in third trimester  -     albuterol (PROVENTIL) 2.5 mg /3 mL (0.083 %) nebulizer solution; Take 3 mLs (2.5 mg total) by nebulization every 8 (eight) hours as needed for Wheezing or Shortness of Breath. Rescue    Other orders  -     Progressive Mobility Protocol (mobilize patient to their highest level of functioning at least twice daily); Standing  -     Bed rest With bathroom privileges; Standing  -     lactated ringers bolus 1,000 mL  -     lactated ringers infusion  -     IP VTE LOW RISK PATIENT; Standing  -     sodium citrate-citric acid 500-334 mg/5 ml solution 30 mL  -     famotidine (PF) injection 20 mg  -     methylergonovine injection 200 mcg  -     carboprost injection 250 mcg  -     mupirocin 2 % ointment  -     oxytocin 30 units in 500 mL normal saline infusion (loading dose)  -     oxytocin 30 units/500 mL (60 milliunits/mL) in  0.9% NaCl (TITRATING)  -     ceFAZolin 3 g in D5W 100 mL IVPB (MB+)        No orders of the defined types were placed in this encounter.      Follow up in about 2 days (around 8/16/2024) for to labor and delivery at 8am.

## 2024-08-15 ENCOUNTER — ANESTHESIA EVENT (OUTPATIENT)
Dept: OBSTETRICS AND GYNECOLOGY | Facility: HOSPITAL | Age: 34
End: 2024-08-15
Payer: MEDICAID

## 2024-08-16 ENCOUNTER — HOSPITAL ENCOUNTER (INPATIENT)
Facility: HOSPITAL | Age: 34
LOS: 2 days | Discharge: HOME | End: 2024-08-18
Attending: OBSTETRICS & GYNECOLOGY | Admitting: OBSTETRICS & GYNECOLOGY
Payer: MEDICAID

## 2024-08-16 ENCOUNTER — ANESTHESIA (OUTPATIENT)
Dept: OBSTETRICS AND GYNECOLOGY | Facility: HOSPITAL | Age: 34
End: 2024-08-16
Payer: MEDICAID

## 2024-08-16 DIAGNOSIS — Z98.891 HISTORY OF 2 CESAREAN SECTIONS: ICD-10-CM

## 2024-08-16 DIAGNOSIS — Z98.891 STATUS POST REPEAT LOW TRANSVERSE CESAREAN SECTION: Primary | ICD-10-CM

## 2024-08-16 DIAGNOSIS — Z98.891 HISTORY OF CESAREAN SECTION: ICD-10-CM

## 2024-08-16 LAB
ABO GROUP BLD: NORMAL
BASOPHILS # BLD AUTO: 0.02 K/UL (ref 0–0.2)
BASOPHILS NFR BLD: 0.4 % (ref 0–1.9)
BLD GP AB SCN CELLS X3 SERPL QL: NORMAL
DIFFERENTIAL METHOD BLD: ABNORMAL
EOSINOPHIL # BLD AUTO: 0.1 K/UL (ref 0–0.5)
EOSINOPHIL NFR BLD: 1.9 % (ref 0–8)
ERYTHROCYTE [DISTWIDTH] IN BLOOD BY AUTOMATED COUNT: 15.3 % (ref 11.5–14.5)
HCT VFR BLD AUTO: 33.3 % (ref 37–48.5)
HGB BLD-MCNC: 11 G/DL (ref 12–16)
IMM GRANULOCYTES # BLD AUTO: 0.02 K/UL (ref 0–0.04)
IMM GRANULOCYTES NFR BLD AUTO: 0.4 % (ref 0–0.5)
LYMPHOCYTES # BLD AUTO: 1.7 K/UL (ref 1–4.8)
LYMPHOCYTES NFR BLD: 29.5 % (ref 18–48)
MCH RBC QN AUTO: 28.1 PG (ref 27–31)
MCHC RBC AUTO-ENTMCNC: 33 G/DL (ref 32–36)
MCV RBC AUTO: 85 FL (ref 82–98)
MONOCYTES # BLD AUTO: 0.3 K/UL (ref 0.3–1)
MONOCYTES NFR BLD: 5.3 % (ref 4–15)
NEUTROPHILS # BLD AUTO: 3.6 K/UL (ref 1.8–7.7)
NEUTROPHILS NFR BLD: 62.5 % (ref 38–73)
NRBC BLD-RTO: 0 /100 WBC
PLATELET # BLD AUTO: 289 K/UL (ref 150–450)
PMV BLD AUTO: 10.4 FL (ref 9.2–12.9)
RBC # BLD AUTO: 3.91 M/UL (ref 4–5.4)
RH BLD: NORMAL
TREPONEMA PALLIDUM IGG+IGM AB [PRESENCE] IN SERUM OR PLASMA BY IMMUNOASSAY: NONREACTIVE
WBC # BLD AUTO: 5.69 K/UL (ref 3.9–12.7)

## 2024-08-16 PROCEDURE — 86901 BLOOD TYPING SEROLOGIC RH(D): CPT | Performed by: OBSTETRICS & GYNECOLOGY

## 2024-08-16 PROCEDURE — 63600175 PHARM REV CODE 636 W HCPCS: Performed by: NURSE ANESTHETIST, CERTIFIED REGISTERED

## 2024-08-16 PROCEDURE — 59025 FETAL NON-STRESS TEST: CPT

## 2024-08-16 PROCEDURE — 51702 INSERT TEMP BLADDER CATH: CPT

## 2024-08-16 PROCEDURE — 63600175 PHARM REV CODE 636 W HCPCS: Performed by: OBSTETRICS & GYNECOLOGY

## 2024-08-16 PROCEDURE — 37000009 HC ANESTHESIA EA ADD 15 MINS: Performed by: OBSTETRICS & GYNECOLOGY

## 2024-08-16 PROCEDURE — 36004725 HC OB OR TIME LEV III - EA ADD 15 MIN: Performed by: OBSTETRICS & GYNECOLOGY

## 2024-08-16 PROCEDURE — 59514 CESAREAN DELIVERY ONLY: CPT | Mod: GB,,, | Performed by: OBSTETRICS & GYNECOLOGY

## 2024-08-16 PROCEDURE — 25000003 PHARM REV CODE 250: Performed by: OBSTETRICS & GYNECOLOGY

## 2024-08-16 PROCEDURE — 85025 COMPLETE CBC W/AUTO DIFF WBC: CPT | Performed by: OBSTETRICS & GYNECOLOGY

## 2024-08-16 PROCEDURE — 86900 BLOOD TYPING SEROLOGIC ABO: CPT | Performed by: OBSTETRICS & GYNECOLOGY

## 2024-08-16 PROCEDURE — 59514 CESAREAN DELIVERY ONLY: CPT | Mod: AS,GB,, | Performed by: PHYSICIAN ASSISTANT

## 2024-08-16 PROCEDURE — 63600175 PHARM REV CODE 636 W HCPCS: Mod: JZ,JG | Performed by: ANESTHESIOLOGY

## 2024-08-16 PROCEDURE — 86593 SYPHILIS TEST NON-TREP QUANT: CPT | Performed by: OBSTETRICS & GYNECOLOGY

## 2024-08-16 PROCEDURE — 11000001 HC ACUTE MED/SURG PRIVATE ROOM

## 2024-08-16 PROCEDURE — 36004724 HC OB OR TIME LEV III - 1ST 15 MIN: Performed by: OBSTETRICS & GYNECOLOGY

## 2024-08-16 PROCEDURE — 86850 RBC ANTIBODY SCREEN: CPT | Performed by: OBSTETRICS & GYNECOLOGY

## 2024-08-16 PROCEDURE — 25000003 PHARM REV CODE 250: Performed by: ANESTHESIOLOGY

## 2024-08-16 PROCEDURE — 71000033 HC RECOVERY, INTIAL HOUR: Performed by: OBSTETRICS & GYNECOLOGY

## 2024-08-16 PROCEDURE — 37000008 HC ANESTHESIA 1ST 15 MINUTES: Performed by: OBSTETRICS & GYNECOLOGY

## 2024-08-16 PROCEDURE — 71000039 HC RECOVERY, EACH ADD'L HOUR: Performed by: OBSTETRICS & GYNECOLOGY

## 2024-08-16 RX ORDER — KETOROLAC TROMETHAMINE 30 MG/ML
30 INJECTION, SOLUTION INTRAMUSCULAR; INTRAVENOUS EVERY 6 HOURS
Status: DISCONTINUED | OUTPATIENT
Start: 2024-08-16 | End: 2024-08-16

## 2024-08-16 RX ORDER — OXYCODONE HYDROCHLORIDE 5 MG/1
5 TABLET ORAL EVERY 4 HOURS PRN
Status: ACTIVE | OUTPATIENT
Start: 2024-08-16 | End: 2024-08-17

## 2024-08-16 RX ORDER — OXYTOCIN-SODIUM CHLORIDE 0.9% IV SOLN 30 UNIT/500ML 30-0.9/5 UT/ML-%
10 SOLUTION INTRAVENOUS ONCE AS NEEDED
Status: DISCONTINUED | OUTPATIENT
Start: 2024-08-16 | End: 2024-08-18 | Stop reason: HOSPADM

## 2024-08-16 RX ORDER — NIFEDIPINE 30 MG/1
30 TABLET, EXTENDED RELEASE ORAL DAILY
Status: DISCONTINUED | OUTPATIENT
Start: 2024-08-16 | End: 2024-08-18 | Stop reason: HOSPADM

## 2024-08-16 RX ORDER — OXYTOCIN-SODIUM CHLORIDE 0.9% IV SOLN 30 UNIT/500ML 30-0.9/5 UT/ML-%
10 SOLUTION INTRAVENOUS ONCE
Status: DISCONTINUED | OUTPATIENT
Start: 2024-08-16 | End: 2024-08-16

## 2024-08-16 RX ORDER — SODIUM CITRATE AND CITRIC ACID MONOHYDRATE 334; 500 MG/5ML; MG/5ML
30 SOLUTION ORAL ONCE
Status: DISCONTINUED | OUTPATIENT
Start: 2024-08-16 | End: 2024-08-16

## 2024-08-16 RX ORDER — PRENATAL WITH FERROUS FUM AND FOLIC ACID 3080; 920; 120; 400; 22; 1.84; 3; 20; 10; 1; 12; 200; 27; 25; 2 [IU]/1; [IU]/1; MG/1; [IU]/1; MG/1; MG/1; MG/1; MG/1; MG/1; MG/1; UG/1; MG/1; MG/1; MG/1; MG/1
1 TABLET ORAL DAILY
Status: DISCONTINUED | OUTPATIENT
Start: 2024-08-16 | End: 2024-08-16

## 2024-08-16 RX ORDER — FAMOTIDINE 10 MG/ML
20 INJECTION INTRAVENOUS
Status: DISCONTINUED | OUTPATIENT
Start: 2024-08-16 | End: 2024-08-16

## 2024-08-16 RX ORDER — SODIUM CHLORIDE, SODIUM LACTATE, POTASSIUM CHLORIDE, CALCIUM CHLORIDE 600; 310; 30; 20 MG/100ML; MG/100ML; MG/100ML; MG/100ML
INJECTION, SOLUTION INTRAVENOUS CONTINUOUS
Status: DISCONTINUED | OUTPATIENT
Start: 2024-08-16 | End: 2024-08-16

## 2024-08-16 RX ORDER — LANOLIN ALCOHOL/MO/W.PET/CERES
1 CREAM (GRAM) TOPICAL DAILY
Status: DISCONTINUED | OUTPATIENT
Start: 2024-08-16 | End: 2024-08-18 | Stop reason: HOSPADM

## 2024-08-16 RX ORDER — SODIUM CHLORIDE 0.9 % (FLUSH) 0.9 %
10 SYRINGE (ML) INJECTION
Status: DISCONTINUED | OUTPATIENT
Start: 2024-08-16 | End: 2024-08-18 | Stop reason: HOSPADM

## 2024-08-16 RX ORDER — DIPHENHYDRAMINE HCL 25 MG
25 CAPSULE ORAL EVERY 4 HOURS PRN
Status: DISCONTINUED | OUTPATIENT
Start: 2024-08-16 | End: 2024-08-18 | Stop reason: HOSPADM

## 2024-08-16 RX ORDER — OXYTOCIN-SODIUM CHLORIDE 0.9% IV SOLN 30 UNIT/500ML 30-0.9/5 UT/ML-%
95 SOLUTION INTRAVENOUS ONCE AS NEEDED
Status: DISCONTINUED | OUTPATIENT
Start: 2024-08-16 | End: 2024-08-18 | Stop reason: HOSPADM

## 2024-08-16 RX ORDER — DIPHENOXYLATE HYDROCHLORIDE AND ATROPINE SULFATE 2.5; .025 MG/1; MG/1
2 TABLET ORAL EVERY 6 HOURS PRN
Status: DISCONTINUED | OUTPATIENT
Start: 2024-08-16 | End: 2024-08-18 | Stop reason: HOSPADM

## 2024-08-16 RX ORDER — CARBOPROST TROMETHAMINE 250 UG/ML
250 INJECTION, SOLUTION INTRAMUSCULAR
Status: DISCONTINUED | OUTPATIENT
Start: 2024-08-16 | End: 2024-08-18 | Stop reason: HOSPADM

## 2024-08-16 RX ORDER — OXYTOCIN-SODIUM CHLORIDE 0.9% IV SOLN 30 UNIT/500ML 30-0.9/5 UT/ML-%
95 SOLUTION INTRAVENOUS ONCE
Status: DISCONTINUED | OUTPATIENT
Start: 2024-08-16 | End: 2024-08-18 | Stop reason: HOSPADM

## 2024-08-16 RX ORDER — ACETAMINOPHEN 325 MG/1
650 TABLET ORAL EVERY 6 HOURS
Status: COMPLETED | OUTPATIENT
Start: 2024-08-16 | End: 2024-08-17

## 2024-08-16 RX ORDER — FENTANYL CITRATE 50 UG/ML
INJECTION, SOLUTION INTRAMUSCULAR; INTRAVENOUS
Status: DISCONTINUED | OUTPATIENT
Start: 2024-08-16 | End: 2024-08-16

## 2024-08-16 RX ORDER — OXYTOCIN-SODIUM CHLORIDE 0.9% IV SOLN 30 UNIT/500ML 30-0.9/5 UT/ML-%
95 SOLUTION INTRAVENOUS ONCE
Status: DISCONTINUED | OUTPATIENT
Start: 2024-08-16 | End: 2024-08-16

## 2024-08-16 RX ORDER — METOCLOPRAMIDE HYDROCHLORIDE 5 MG/ML
10 INJECTION INTRAMUSCULAR; INTRAVENOUS ONCE
Status: DISCONTINUED | OUTPATIENT
Start: 2024-08-16 | End: 2024-08-16

## 2024-08-16 RX ORDER — MISOPROSTOL 200 UG/1
800 TABLET ORAL ONCE AS NEEDED
Status: DISCONTINUED | OUTPATIENT
Start: 2024-08-16 | End: 2024-08-18 | Stop reason: HOSPADM

## 2024-08-16 RX ORDER — SIMETHICONE 80 MG
1 TABLET,CHEWABLE ORAL EVERY 6 HOURS PRN
Status: DISCONTINUED | OUTPATIENT
Start: 2024-08-16 | End: 2024-08-18 | Stop reason: HOSPADM

## 2024-08-16 RX ORDER — SODIUM CITRATE AND CITRIC ACID MONOHYDRATE 334; 500 MG/5ML; MG/5ML
30 SOLUTION ORAL
Status: DISCONTINUED | OUTPATIENT
Start: 2024-08-16 | End: 2024-08-16

## 2024-08-16 RX ORDER — ONDANSETRON HYDROCHLORIDE 2 MG/ML
4 INJECTION, SOLUTION INTRAVENOUS EVERY 6 HOURS PRN
Status: ACTIVE | OUTPATIENT
Start: 2024-08-16 | End: 2024-08-17

## 2024-08-16 RX ORDER — MUPIROCIN 20 MG/G
OINTMENT TOPICAL
Status: DISCONTINUED | OUTPATIENT
Start: 2024-08-16 | End: 2024-08-16

## 2024-08-16 RX ORDER — HYDROCODONE BITARTRATE AND ACETAMINOPHEN 10; 325 MG/1; MG/1
1 TABLET ORAL EVERY 4 HOURS PRN
Status: DISCONTINUED | OUTPATIENT
Start: 2024-08-17 | End: 2024-08-18 | Stop reason: HOSPADM

## 2024-08-16 RX ORDER — MORPHINE SULFATE 0.5 MG/ML
INJECTION, SOLUTION EPIDURAL; INTRATHECAL; INTRAVENOUS
Status: DISCONTINUED | OUTPATIENT
Start: 2024-08-16 | End: 2024-08-16

## 2024-08-16 RX ORDER — METOCLOPRAMIDE HYDROCHLORIDE 5 MG/ML
INJECTION INTRAMUSCULAR; INTRAVENOUS
Status: DISCONTINUED | OUTPATIENT
Start: 2024-08-16 | End: 2024-08-16

## 2024-08-16 RX ORDER — OXYCODONE HYDROCHLORIDE 5 MG/1
10 TABLET ORAL EVERY 4 HOURS PRN
Status: DISPENSED | OUTPATIENT
Start: 2024-08-16 | End: 2024-08-17

## 2024-08-16 RX ORDER — LIDOCAINE HYDROCHLORIDE 10 MG/ML
1 INJECTION, SOLUTION EPIDURAL; INFILTRATION; INTRACAUDAL; PERINEURAL ONCE
Status: DISCONTINUED | OUTPATIENT
Start: 2024-08-16 | End: 2024-08-16

## 2024-08-16 RX ORDER — PHENYLEPHRINE HYDROCHLORIDE 10 MG/ML
INJECTION INTRAVENOUS
Status: DISCONTINUED | OUTPATIENT
Start: 2024-08-16 | End: 2024-08-16

## 2024-08-16 RX ORDER — TRANEXAMIC ACID 10 MG/ML
1000 INJECTION, SOLUTION INTRAVENOUS EVERY 30 MIN PRN
Status: DISCONTINUED | OUTPATIENT
Start: 2024-08-16 | End: 2024-08-18 | Stop reason: HOSPADM

## 2024-08-16 RX ORDER — CARBOPROST TROMETHAMINE 250 UG/ML
250 INJECTION, SOLUTION INTRAMUSCULAR
Status: DISCONTINUED | OUTPATIENT
Start: 2024-08-16 | End: 2024-08-16

## 2024-08-16 RX ORDER — PRENATAL WITH FERROUS FUM AND FOLIC ACID 3080; 920; 120; 400; 22; 1.84; 3; 20; 10; 1; 12; 200; 27; 25; 2 [IU]/1; [IU]/1; MG/1; [IU]/1; MG/1; MG/1; MG/1; MG/1; MG/1; MG/1; UG/1; MG/1; MG/1; MG/1; MG/1
1 TABLET ORAL DAILY
Status: DISCONTINUED | OUTPATIENT
Start: 2024-08-16 | End: 2024-08-18 | Stop reason: HOSPADM

## 2024-08-16 RX ORDER — MUPIROCIN 20 MG/G
OINTMENT TOPICAL 2 TIMES DAILY
Status: DISCONTINUED | OUTPATIENT
Start: 2024-08-16 | End: 2024-08-18 | Stop reason: HOSPADM

## 2024-08-16 RX ORDER — PROCHLORPERAZINE EDISYLATE 5 MG/ML
5 INJECTION INTRAMUSCULAR; INTRAVENOUS EVERY 6 HOURS PRN
Status: DISCONTINUED | OUTPATIENT
Start: 2024-08-16 | End: 2024-08-18 | Stop reason: HOSPADM

## 2024-08-16 RX ORDER — BUPIVACAINE HYDROCHLORIDE 7.5 MG/ML
INJECTION, SOLUTION EPIDURAL; RETROBULBAR
Status: COMPLETED | OUTPATIENT
Start: 2024-08-16 | End: 2024-08-16

## 2024-08-16 RX ORDER — METHYLERGONOVINE MALEATE 0.2 MG/ML
200 INJECTION INTRAVENOUS ONCE AS NEEDED
Status: DISCONTINUED | OUTPATIENT
Start: 2024-08-16 | End: 2024-08-18 | Stop reason: HOSPADM

## 2024-08-16 RX ORDER — ACETAMINOPHEN 10 MG/ML
INJECTION, SOLUTION INTRAVENOUS
Status: DISCONTINUED | OUTPATIENT
Start: 2024-08-16 | End: 2024-08-16

## 2024-08-16 RX ORDER — ONDANSETRON HYDROCHLORIDE 2 MG/ML
INJECTION, SOLUTION INTRAMUSCULAR; INTRAVENOUS
Status: DISCONTINUED | OUTPATIENT
Start: 2024-08-16 | End: 2024-08-16

## 2024-08-16 RX ORDER — OXYTOCIN 10 [USP'U]/ML
INJECTION, SOLUTION INTRAMUSCULAR; INTRAVENOUS
Status: DISCONTINUED | OUTPATIENT
Start: 2024-08-16 | End: 2024-08-16

## 2024-08-16 RX ORDER — HYDROCORTISONE 25 MG/G
CREAM TOPICAL
Status: DISCONTINUED | OUTPATIENT
Start: 2024-08-16 | End: 2024-08-18 | Stop reason: HOSPADM

## 2024-08-16 RX ORDER — DEXTROSE, SODIUM CHLORIDE, SODIUM LACTATE, POTASSIUM CHLORIDE, AND CALCIUM CHLORIDE 5; .6; .31; .03; .02 G/100ML; G/100ML; G/100ML; G/100ML; G/100ML
INJECTION, SOLUTION INTRAVENOUS CONTINUOUS PRN
Status: DISCONTINUED | OUTPATIENT
Start: 2024-08-16 | End: 2024-08-16

## 2024-08-16 RX ORDER — METHYLERGONOVINE MALEATE 0.2 MG/ML
200 INJECTION INTRAVENOUS
Status: DISCONTINUED | OUTPATIENT
Start: 2024-08-16 | End: 2024-08-16

## 2024-08-16 RX ORDER — HYDROCORTISONE 25 MG/G
CREAM TOPICAL 3 TIMES DAILY PRN
Status: DISCONTINUED | OUTPATIENT
Start: 2024-08-16 | End: 2024-08-18 | Stop reason: HOSPADM

## 2024-08-16 RX ORDER — MISOPROSTOL 200 UG/1
800 TABLET ORAL
Status: DISCONTINUED | OUTPATIENT
Start: 2024-08-16 | End: 2024-08-16

## 2024-08-16 RX ORDER — BISACODYL 10 MG/1
10 SUPPOSITORY RECTAL ONCE AS NEEDED
Status: DISCONTINUED | OUTPATIENT
Start: 2024-08-16 | End: 2024-08-18 | Stop reason: HOSPADM

## 2024-08-16 RX ORDER — OXYTOCIN 10 [USP'U]/ML
10 INJECTION, SOLUTION INTRAMUSCULAR; INTRAVENOUS ONCE AS NEEDED
Status: DISCONTINUED | OUTPATIENT
Start: 2024-08-16 | End: 2024-08-18 | Stop reason: HOSPADM

## 2024-08-16 RX ORDER — IBUPROFEN 600 MG/1
600 TABLET ORAL EVERY 6 HOURS
Status: DISCONTINUED | OUTPATIENT
Start: 2024-08-16 | End: 2024-08-18 | Stop reason: HOSPADM

## 2024-08-16 RX ORDER — ADHESIVE BANDAGE
30 BANDAGE TOPICAL 2 TIMES DAILY PRN
Status: DISCONTINUED | OUTPATIENT
Start: 2024-08-17 | End: 2024-08-18 | Stop reason: HOSPADM

## 2024-08-16 RX ORDER — HYDROCODONE BITARTRATE AND ACETAMINOPHEN 5; 325 MG/1; MG/1
1 TABLET ORAL EVERY 4 HOURS PRN
Status: DISCONTINUED | OUTPATIENT
Start: 2024-08-17 | End: 2024-08-18 | Stop reason: HOSPADM

## 2024-08-16 RX ORDER — DOCUSATE SODIUM 100 MG/1
200 CAPSULE, LIQUID FILLED ORAL 2 TIMES DAILY
Status: DISCONTINUED | OUTPATIENT
Start: 2024-08-16 | End: 2024-08-18 | Stop reason: HOSPADM

## 2024-08-16 RX ORDER — AMOXICILLIN 250 MG
1 CAPSULE ORAL NIGHTLY PRN
Status: DISCONTINUED | OUTPATIENT
Start: 2024-08-16 | End: 2024-08-18 | Stop reason: HOSPADM

## 2024-08-16 RX ORDER — ONDANSETRON 8 MG/1
8 TABLET, ORALLY DISINTEGRATING ORAL EVERY 8 HOURS PRN
Status: DISCONTINUED | OUTPATIENT
Start: 2024-08-16 | End: 2024-08-18 | Stop reason: HOSPADM

## 2024-08-16 RX ADMIN — SODIUM CHLORIDE, POTASSIUM CHLORIDE, SODIUM LACTATE AND CALCIUM CHLORIDE 1000 ML: 600; 310; 30; 20 INJECTION, SOLUTION INTRAVENOUS at 11:08

## 2024-08-16 RX ADMIN — BUPIVACAINE HYDROCHLORIDE 1.6 ML: 7.5 INJECTION, SOLUTION EPIDURAL; RETROBULBAR at 12:08

## 2024-08-16 RX ADMIN — FENTANYL CITRATE 10 MCG: 0.05 INJECTION, SOLUTION INTRAMUSCULAR; INTRAVENOUS at 12:08

## 2024-08-16 RX ADMIN — PHENYLEPHRINE HYDROCHLORIDE 100 MCG: 10 INJECTION INTRAVENOUS at 12:08

## 2024-08-16 RX ADMIN — IBUPROFEN 600 MG: 600 TABLET ORAL at 11:08

## 2024-08-16 RX ADMIN — DOCUSATE SODIUM 200 MG: 100 CAPSULE, LIQUID FILLED ORAL at 08:08

## 2024-08-16 RX ADMIN — DEXTROSE MONOHYDRATE 3 G: 5 INJECTION INTRAVENOUS at 11:08

## 2024-08-16 RX ADMIN — HYDROCORTISONE ACETATE AND PRAMOXINE HYDROCHLORIDE: 10; 10 CREAM TOPICAL at 04:08

## 2024-08-16 RX ADMIN — SODIUM CHLORIDE, SODIUM LACTATE, POTASSIUM CHLORIDE, AND CALCIUM CHLORIDE: .6; .31; .03; .02 INJECTION, SOLUTION INTRAVENOUS at 11:08

## 2024-08-16 RX ADMIN — MUPIROCIN: 20 OINTMENT TOPICAL at 08:08

## 2024-08-16 RX ADMIN — MORPHINE SULFATE 0.1 MG: 0.5 INJECTION, SOLUTION EPIDURAL; INTRATHECAL; INTRAVENOUS at 12:08

## 2024-08-16 RX ADMIN — ONDANSETRON 4 MG: 2 INJECTION INTRAMUSCULAR; INTRAVENOUS at 01:08

## 2024-08-16 RX ADMIN — DIPHENHYDRAMINE HYDROCHLORIDE 25 MG: 25 CAPSULE ORAL at 02:08

## 2024-08-16 RX ADMIN — DEXTROSE, SODIUM CHLORIDE, SODIUM LACTATE, POTASSIUM CHLORIDE, AND CALCIUM CHLORIDE: 5; .6; .31; .03; .02 INJECTION, SOLUTION INTRAVENOUS at 12:08

## 2024-08-16 RX ADMIN — FAMOTIDINE 20 MG: 10 INJECTION, SOLUTION INTRAVENOUS at 11:08

## 2024-08-16 RX ADMIN — IBUPROFEN 600 MG: 600 TABLET ORAL at 05:08

## 2024-08-16 RX ADMIN — NIFEDIPINE 30 MG: 30 TABLET, FILM COATED, EXTENDED RELEASE ORAL at 02:08

## 2024-08-16 RX ADMIN — METOCLOPRAMIDE 10 MG: 5 INJECTION, SOLUTION INTRAMUSCULAR; INTRAVENOUS at 12:08

## 2024-08-16 RX ADMIN — ACETAMINOPHEN 650 MG: 325 TABLET ORAL at 08:08

## 2024-08-16 RX ADMIN — FERROUS SULFATE TAB 325 MG (65 MG ELEMENTAL FE) 1 EACH: 325 (65 FE) TAB at 02:08

## 2024-08-16 RX ADMIN — ACETAMINOPHEN 1000 MG: 10 INJECTION, SOLUTION INTRAVENOUS at 01:08

## 2024-08-16 RX ADMIN — OXYTOCIN 30 UNITS: 10 INJECTION, SOLUTION INTRAMUSCULAR; INTRAVENOUS at 12:08

## 2024-08-16 RX ADMIN — PHENYLEPHRINE HYDROCHLORIDE 200 MCG: 10 INJECTION INTRAVENOUS at 12:08

## 2024-08-16 RX ADMIN — Medication 10 UNITS: at 01:08

## 2024-08-16 RX ADMIN — MUPIROCIN: 20 OINTMENT TOPICAL at 11:08

## 2024-08-16 RX ADMIN — FENTANYL CITRATE 90 MCG: 50 INJECTION, SOLUTION INTRAMUSCULAR; INTRAVENOUS at 12:08

## 2024-08-16 NOTE — ANESTHESIA PROCEDURE NOTES
Spinal    Diagnosis: Repeat   Patient location during procedure: OR  Start time: 2024 11:58 AM  Timeout: 2024 11:57 AM  End time: 2024 12:00 PM    Staffing  Authorizing Provider: Jose Manuel Polanco MD  Performing Provider: Jose Manuel Polanco MD    Staffing  Performed by: Jose Manuel Polanco MD  Authorized by: Jose Manuel Polanco MD    Preanesthetic Checklist  Completed: patient identified, IV checked, site marked, risks and benefits discussed, surgical consent, monitors and equipment checked, pre-op evaluation and timeout performed  Spinal Block  Patient position: sitting  Prep: ChloraPrep  Patient monitoring: continuous pulse ox and frequent blood pressure checks  Approach: midline  Location: L3-4  Injection technique: single shot  CSF Fluid: clear free-flowing CSF  Needle  Needle type: pencil-tip   Needle gauge: 24 G  Needle length: 4 in  Additional Documentation: no paresthesia on injection  Needle localization: anatomical landmarks  Assessment  Sensory level: T4   Dermatomal levels determined by pinch or prick  Ease of block: easy  Patient's tolerance of the procedure: comfortable throughout block  Medications:    Medications: bupivacaine (pf) (MARCAINE) injection 0.75% - Intraspinal   1.6 mL - 2024 12:00:00 PM

## 2024-08-16 NOTE — PLAN OF CARE
Problem: Breastfeeding  Goal: Effective Breastfeeding  Outcome: Progressing  Intervention: Promote Breast Care and Comfort  Flowsheets (Taken 8/16/2024 1330)  Breast Care: Breastfeeding: open to air  Intervention: Promote Effective Breastfeeding  Flowsheets (Taken 8/16/2024 1330)  Breastfeeding Assistance:   infant latch-on verified   feeding session observed   feeding on demand promoted   feeding cue recognition promoted   infant suck/swallow verified   support offered  Parent-Child Attachment Promotion:   parent/caregiver presence encouraged   interaction encouraged   face-to-face positioning promoted   cue recognition promoted   caring behavior modeled   participation in care promoted   positive reinforcement provided   rooming-in promoted   skin-to-skin contact encouraged   strengths emphasized  Intervention: Support Exclusive Breastfeeding Success  Flowsheets (Taken 8/16/2024 1330)  Supportive Measures:   active listening utilized   counseling provided   decision-making supported   goal-setting facilitated   self-care encouraged   problem-solving facilitated   positive reinforcement provided   self-responsibility promoted   verbalization of feelings encouraged  Breastfeeding Support:   diary/feeding log utilized   encouragement provided   infant-mother separation minimized   lactation counseling provided   maternal hydration promoted   maternal nutrition promoted   maternal rest encouraged

## 2024-08-16 NOTE — DISCHARGE INSTRUCTIONS
After a Cesearean Birth    General Discharge Instructions  May follow a regular diet, unless otherwise discussed with physician.  Take showers, not baths until instructed by your doctor.   For the next 5 days, continue to use Hibiclens solution when you shower. Always use a clean towel when drying incision.  Incision dressing should be removed 7 days after surgery. If dressing begins to peel up prior to this day, gently remove.    Activity as tolerated.  No lifting or heavy exercise for 6 weeks, no driving for 2 weeks, no sexual intercourse, douching or tampons for 6 weeks  May return to work/school as discussed with physician  Discuss birth control with physician  Breast care support bra worn at all times  Lactation consultant referral number ( [phone] or [phone])    Call Your Healthcare Provider Right Away If You Have:  A temperature of 100.4°F or higher.  If your blood pressure is over 140/90.  You have difficulty catching your breath or trouble breathing.  Heavy vaginal bleeding, clots, or vaginal discharge with foul odor. (heavier than menses)  Persistent nausea or vomiting.  You gain more than 3 pounds in 3 days.  Severe headaches not relieved by Tylenol (acetaminophen) or Motrin (ibuprofen)  Blurry or double vision, see spots or flashing lights.  Dizziness or fainting.  New onset swelling or worsening of existing swelling.  Burning or pain when you urinate.  No bowel movement for 5 days.  Redness, warmth, swelling, or pain in the lower leg.  Redness, discharge, or pain worse than you had in the hospital.  Burning, pain, red streaks, or lumpy areas in your breasts.  Cracks, blisters, or blood on your nipples.  Feelings of extreme sadness or anxiety, or a feeling that you dont want to be with your baby.  If you have any new or unusual symptoms or have questions or concerns    Incision Care  You will be able to shower and pat the incision dry.  Watch your incision for signs of infection, such as  increasing redness or drainage.  For ease of movement, hold a pillow against the incision when you get up from a lying or sitting position, and when you laugh or cough.  Avoid heavy lifting--nothing heavier than your baby until your doctor instructs you otherwise.     Follow-Up  Schedule a  follow-up exam with your healthcare provider for about 6 weeks after delivery. During this exam, your uterus and vaginal area will be checked. Contact your healthcare provider if you think you or your baby are having any problems.                                                                                                              Breastfeeding Discharge Instructions     AAP recommendation of exclusive breastfeeding for the first 6 months of life and continued breastfeeding with the introduction of supplemental foods beyond the first year of life and recommends to delay all bottle and pacifier use until after 4 weeks of age and breastfeeding is well established.  Discussed the benefits of exclusive breastfeeding for both mother and baby.  Discussed the risks of supplementation/pacifier use on the exclusivity of breastfeeding in the first 6 months. Feed the baby at the earliest sign of hunger or comfort  Hands to mouth, sucking motions  Rooting or searching for something to suck on  Dont wait for crying - it is a not a late sign of hunger; it is a sign of distress    The feedings may be 8-12 times per 24hrs and will not follow a schedule  Alternate the breast you start the feeding with, or start with the breast that feels the fullest  Switch breasts when the baby takes himself off the breast or falls asleep  Keep offering breasts until the baby looks full, no longer gives hunger signs, and stays asleep when placed on his back in the crib  If the baby is sleepy and wont wake for a feeding, put the baby skin-to-skin dressed in a diaper against the mothers bare chest  Sleep near your baby  The baby should  be positioned and latched on to the breast correctly  Chest-to-chest, chin in the breast  Babys lips are flipped outward  Babys mouth is stretched open wide like a shout  Babys sucking should feel like tugging to the mother  The baby should be drinking at the breast:  You should hear swallowing or gulping throughout the feeding  You should see milk on the babys lips when he comes off the breast  Your breasts should be softer when the baby is finished feeding  The baby should look relaxed at the end of feedings  After the 4th day and your milk is in:  The babys poop should turn bright yellow and be loose, watery, and seedy  The baby should have at least 3-4 poops the size of the palm of your hand per day  The baby should have at least 6-8 wet diapers per day  The urine should be light yellow in color  You should drink when you are thirsty and eat a healthy diet when you are    hungry.     Take naps to get the rest you need.   Take medications and/or drink alcohol only with permission of your obstetrician    or the babys pediatrician.  You can also call the [facility],   ([phone]), Monday-Friday, 8am-5pm Central time, to get the most   up-to-date evidence-based information on the use of medications during   pregnancy and breastfeeding.      The baby should be examined by a pediatrician at 3-5 days of age; unless ordered sooner by the pediatrician.  Once your milk comes in, the baby should be back to birth weight no later than 10-14 days of age.    Primary Engorgement    If the milk is flowing, use wet or dry heat applied to the breasts for approximately 10min prior to each feeding as a comfort measure to facilitate the milk ejection reflex    Follow heat treatment with breast massage to soften hard/lumpy areas of the breast    Use unrestricted, frequent, effective feedings.      Wake baby to feed if necessary    Avoid pacifier and bottle feedings    Hand express or pump breasts to the point of  comfort prn    Use cold treatments in the form of ice packs/gel packs/ frozen vegetables wrapped in a soft thin cloth and applied to the breasts for approximately 20min after each feeding until engorgement is resolved    Wear comfortable, supportive bra    Take pain medicine prn    Use anti-inflammatory medications if prescribed by physician    Other:    [place] Pumping Instructions :    Preparation and Hygiene:    Shower daily.  Wear a clean bra each day and wash daily in warm soapy water.  Change wet or moist breast pads frequently.  Moist pads can promote growth of germs.  Actively wash your hands, paying close attention to the area around and under your fingernails, thoroughly with soap and water for 15 seconds before pumping or handling your milk.  Re-wash your hands if you touch anything (scratching your nose, answering the phone, etc) while pumping or handling your milk.   Before pumping your breasts, assemble the pump collection kit and have ready the sterile container and labels.  Place these items on a clean surface next to the breastpump.  Each time after you have finished pumping, take apart all of the parts of the breastpump collection kit and place them in a separate cleaning container (do not place them in the sink).  Be sure to remove the yellow valve from the breastshield and separate the white membrane from the yellow valve.  Rinse all of these parts with cool water.  Then use a new sponge and/or bottle brush and dishwashing detergent to clean the parts.  Rinse off the soapy water with cool water and air dry on a clean towel covered with a clean cloth.  All parts may also be washed after each use in the top rack of a .  Once each day, sterilize all of the parts of the breastpump collection kit.  This can be done by boiling the kit parts for 10 minutes or by using a Quick Clean Micro-Steam Bag made by [employer]  If condensation appears in the tubing, continue to run the pump with the  tubing attached for 1-2 minutes or until the tubing is dry.   Notify your babys nurse or doctor if you become ill or need to take any medication, even over-the-counter medicines.        Collection and Storage of Expressed Breastmilk:         1. Pump your breasts at least 8-10 times every 24 hours.  Double pump (both breasts at  the same time) for at least 15-20 minutes using the most suction that is comfortable.    2. Write the date and time of pumping and the name of any medications you are takingon the babys pre-printed hospital identification label.   3.    Do not touch the inside of the storage containers or lids.      4.        Tightly screw the lid onto the container and place immediately into the                                refrigerator for daily use.  Bottle may remain at room temperature if the next                    feeding is within 4 hours.  5.    Expressed breastmilk should be refrigerated or frozen within 4 hours of                pumping.  6.        Do not store expressed breastmilk on the door of your refrigerator or freeze             where the temperature is warmer.   7.        Refrigerated milk may be stored in for up to 7 days.  At this point it can be              moved to the freezer for 6 -12 months.  8.        Thaw frozen breast milk in overnight in the refrigerator.  Once milk is thawed it              must be used within 24 hours.  9.        Refrigerated breast milk needs to be warmed to room temperature.  Warm by leaving unrefrigerated until it reached room temperature, or place sealed bottle into a cup of warm (not boiling) water or use a bottle warmer.               Never warm breast milk in a microwave or boiling water.    For any questions or concerns call:  Lactation Department at [phone]

## 2024-08-16 NOTE — PLAN OF CARE
VSS. Afebrile  Dugan to gravity draining clear yellow urine.   LTV incision, with pressure dressing, clean dry and intact.   Tolerating regular diet without any GI symptoms voiced.  Pain controlled with scheduled meds.  Appropriate maternal bonding noted.   Plan of care reviewed with patient. All questions answered.

## 2024-08-16 NOTE — TRANSFER OF CARE
"Anesthesia Transfer of Care Note    Patient: Ole Allen    Procedure(s) Performed: Procedure(s) (LRB):   SECTION (N/A)    Patient location: Labor and Delivery    Anesthesia Type: spinal    Transport from OR: Transported from OR on room air with adequate spontaneous ventilation    Post pain: adequate analgesia    Post assessment: no apparent anesthetic complications and tolerated procedure well    Post vital signs: stable    Level of consciousness: awake, alert and oriented    Nausea/Vomiting: no nausea/vomiting    Complications: none    Transfer of care protocol was followed    Last vitals: Visit Vitals  /75   Pulse 91   Temp 36.8 °C (98.2 °F) (Oral)   Ht 5' 8" (1.727 m)   Wt 124.7 kg (275 lb)   LMP 2023   SpO2 98%   Breastfeeding No   BMI 41.81 kg/m²     "

## 2024-08-16 NOTE — LACTATION NOTE
Patient requests a breast pump at the bedside. Requests pumping after breastfeeding to stimulate production.  Double electric hospital grade pump brought to bedside.  Patient instructed on set up, use, and cleaning.  Patient pumped for 30 minutes and did not obtain any colostrum.  Encouraged patient to pump immediately after feedings if she is using the pump to stimulate milk production.  Verbalized understanding.  All questions answered.

## 2024-08-16 NOTE — ANESTHESIA PREPROCEDURE EVALUATION
08/16/2024  Ole Allen is a 34 y.o., female.      Pre-op Assessment    I have reviewed the Patient Summary Reports.     I have reviewed the Nursing Notes. I have reviewed the NPO Status.   I have reviewed the Medications.     Review of Systems  Anesthesia Hx:  No problems with previous Anesthesia             Denies Family Hx of Anesthesia complications.    Denies Personal Hx of Anesthesia complications.                    Social:  Non-Smoker, No Alcohol Use       Hematology/Oncology:                   Hematology Comments: Sickle cell trait                    Cardiovascular:     Hypertension                                        Pulmonary:    Asthma mild                   Endocrine:        Morbid Obesity / BMI > 40      Physical Exam  General: Oriented, Alert and Cooperative    Airway:  Mallampati: II   Mouth Opening: Normal  TM Distance: Normal  Tongue: Normal  Neck ROM: Normal ROM    Dental:  Intact        Anesthesia Plan  Type of Anesthesia, risks & benefits discussed:    Anesthesia Type: Spinal  Intra-op Monitoring Plan: Standard ASA Monitors  Post Op Pain Control Plan: intrathecal opioid  Informed Consent: Informed consent signed with the Patient and all parties understand the risks and agree with anesthesia plan.  All questions answered. Patient consented to blood products? Yes  ASA Score: 3    Ready For Surgery From Anesthesia Perspective.     .

## 2024-08-16 NOTE — LACTATION NOTE
08/16/24 1330   Maternal Assessment   Breast Density Bilateral:;soft   Areola Bilateral:;elastic   Nipples Bilateral:;everted;graspable   Maternal Infant Feeding   Maternal Emotional State independent;relaxed   Infant Positioning cradle   Signs of Milk Transfer audible swallow;infant jaw motion present   Pain with Feeding no   Comfort Measures Before/During Feeding infant position adjusted;latch adjusted;maternal position adjusted   Latch Assistance yes     Patient is experienced with breastfeeding her other 3 children.  Needed minimal assistance with positioning infant.  Offered support as needed. Given breast pump information and prescription.  Verbalized understanding.

## 2024-08-17 LAB
BASOPHILS # BLD AUTO: 0.02 K/UL (ref 0–0.2)
BASOPHILS NFR BLD: 0.3 % (ref 0–1.9)
DIFFERENTIAL METHOD BLD: ABNORMAL
EOSINOPHIL # BLD AUTO: 0.1 K/UL (ref 0–0.5)
EOSINOPHIL NFR BLD: 1.7 % (ref 0–8)
ERYTHROCYTE [DISTWIDTH] IN BLOOD BY AUTOMATED COUNT: 15.4 % (ref 11.5–14.5)
HCT VFR BLD AUTO: 31 % (ref 37–48.5)
HGB BLD-MCNC: 10.2 G/DL (ref 12–16)
IMM GRANULOCYTES # BLD AUTO: 0.04 K/UL (ref 0–0.04)
IMM GRANULOCYTES NFR BLD AUTO: 0.5 % (ref 0–0.5)
LYMPHOCYTES # BLD AUTO: 1.1 K/UL (ref 1–4.8)
LYMPHOCYTES NFR BLD: 15 % (ref 18–48)
MCH RBC QN AUTO: 28.4 PG (ref 27–31)
MCHC RBC AUTO-ENTMCNC: 32.9 G/DL (ref 32–36)
MCV RBC AUTO: 86 FL (ref 82–98)
MONOCYTES # BLD AUTO: 0.5 K/UL (ref 0.3–1)
MONOCYTES NFR BLD: 6 % (ref 4–15)
NEUTROPHILS # BLD AUTO: 5.8 K/UL (ref 1.8–7.7)
NEUTROPHILS NFR BLD: 76.5 % (ref 38–73)
NRBC BLD-RTO: 0 /100 WBC
PLATELET # BLD AUTO: 215 K/UL (ref 150–450)
PMV BLD AUTO: 9.9 FL (ref 9.2–12.9)
RBC # BLD AUTO: 3.59 M/UL (ref 4–5.4)
WBC # BLD AUTO: 7.62 K/UL (ref 3.9–12.7)

## 2024-08-17 PROCEDURE — 25000003 PHARM REV CODE 250: Performed by: OBSTETRICS & GYNECOLOGY

## 2024-08-17 PROCEDURE — 99231 SBSQ HOSP IP/OBS SF/LOW 25: CPT | Mod: ,,, | Performed by: OBSTETRICS & GYNECOLOGY

## 2024-08-17 PROCEDURE — 25000003 PHARM REV CODE 250: Performed by: ANESTHESIOLOGY

## 2024-08-17 PROCEDURE — 11000001 HC ACUTE MED/SURG PRIVATE ROOM

## 2024-08-17 PROCEDURE — 85025 COMPLETE CBC W/AUTO DIFF WBC: CPT | Performed by: OBSTETRICS & GYNECOLOGY

## 2024-08-17 PROCEDURE — 36415 COLL VENOUS BLD VENIPUNCTURE: CPT | Performed by: OBSTETRICS & GYNECOLOGY

## 2024-08-17 RX ADMIN — HYDROCODONE BITARTRATE AND ACETAMINOPHEN 1 TABLET: 10; 325 TABLET ORAL at 05:08

## 2024-08-17 RX ADMIN — IBUPROFEN 600 MG: 600 TABLET ORAL at 11:08

## 2024-08-17 RX ADMIN — MAGNESIUM HYDROXIDE 2400 MG: 400 SUSPENSION ORAL at 05:08

## 2024-08-17 RX ADMIN — DOCUSATE SODIUM 200 MG: 100 CAPSULE, LIQUID FILLED ORAL at 09:08

## 2024-08-17 RX ADMIN — IBUPROFEN 600 MG: 600 TABLET ORAL at 05:08

## 2024-08-17 RX ADMIN — SIMETHICONE 80 MG: 80 TABLET, CHEWABLE ORAL at 10:08

## 2024-08-17 RX ADMIN — NIFEDIPINE 30 MG: 30 TABLET, FILM COATED, EXTENDED RELEASE ORAL at 09:08

## 2024-08-17 RX ADMIN — FERROUS SULFATE TAB 325 MG (65 MG ELEMENTAL FE) 1 EACH: 325 (65 FE) TAB at 09:08

## 2024-08-17 RX ADMIN — DOCUSATE SODIUM 200 MG: 100 CAPSULE, LIQUID FILLED ORAL at 08:08

## 2024-08-17 RX ADMIN — MUPIROCIN: 20 OINTMENT TOPICAL at 08:08

## 2024-08-17 RX ADMIN — ACETAMINOPHEN 650 MG: 325 TABLET ORAL at 05:08

## 2024-08-17 RX ADMIN — ACETAMINOPHEN 650 MG: 325 TABLET ORAL at 08:08

## 2024-08-17 RX ADMIN — OXYCODONE 10 MG: 5 TABLET ORAL at 09:08

## 2024-08-17 RX ADMIN — ACETAMINOPHEN 650 MG: 325 TABLET ORAL at 11:08

## 2024-08-17 RX ADMIN — MUPIROCIN: 20 OINTMENT TOPICAL at 09:08

## 2024-08-17 NOTE — PLAN OF CARE
PT stable. VS WNL. Voiding and ambulating independently.  Fundus firm, midline 1 below, light bleeding.  Abdominal dressing has small dried drainage.  Pain managed with ibuprofen 600, and tylenol 650. Tolerating regular diet.  IV SL.  Bonding well with baby. Pt doing well.

## 2024-08-17 NOTE — H&P
[facility]  Obstetrics  History & Physical    Patient Name: Ole Allen  MRN: [id number]  Admission Date: 2024  Primary Care Provider: Umesh Mckeon MD    Subjective:     Principal Problem:Status post repeat low transverse  section    History of Present Illness:   Ole Allen is a 34 y.o.  female with IUP at 39w4d weeks gestation who is admitted for scheduled  Section.     This IUP is complicated by asthma, history fo C/Sx3, h/o HSV, sickle cell trait, Rh negative, and CHTN on procardia..  Patient denies contractions, denies vaginal bleeding, denies LOF.   Fetal Movement: normal.          OB History    Para Term  AB Living   6 4 4 0 2 4   SAB IAB Ectopic Multiple Live Births   0 2 0 0 4      # Outcome Date GA Lbr Zack/2nd Weight Sex Type Anes PTL Lv   6 Term 24 39w4d  3.29 kg (7 lb 4.1 oz) F , V Spinal  CLARIBEL      Name: Girl Ole Allen      Apgar1: 8  Apgar5: 9   5 Term 23 39w3d  3.49 kg (7 lb 11.1 oz) F CS-LTranv Spinal N CLARIBEL      Name: TAMIA,TRACY SOLORZANO      Apgar1: 5  Apgar5: 9   4 Term 10/22/21 41w1d  3.459 kg (7 lb 10 oz) M CS-LTranv Spinal N CLARIBEL      Complications: Gestational hypertension without significant proteinuria during pregnancy in third trimester, antepartum   3 Term 06 39w0d  3.487 kg (7 lb 11 oz) M CS-LTranv  N CLARIBEL      Birth Comments: failed IOL, preE      Complications: Preeclampsia, third trimester   2 IAB            1 IAB               Obstetric Comments   Gynhx: reg   H/o genital herpes, chlamydia     Past Medical History:   Diagnosis Date    Asthma     Hemoglobin S trait     Herpes     Hypertension      Past Surgical History:   Procedure Laterality Date    ANTERIOR CRUCIATE LIGAMENT REPAIR       SECTION       SECTION N/A 10/22/2021    Procedure:  SECTION;  Surgeon: Julie R. Jeansonne, MD;  Location: [facility] L&D;  Service: OB/GYN;   Laterality: N/A;     SECTION N/A 10/22/2021    Procedure:  SECTION;  Surgeon: Sissy Jean MD;  Location: [facility] L&D;  Service: OB/GYN;  Laterality: N/A;     SECTION N/A 2/3/2023    Procedure:  SECTION;  Surgeon: Avila Herman MD;  Location: [facility] L&D OR;  Service: OB/GYN;  Laterality: N/A;     SECTION N/A 2024    Procedure:  SECTION;  Surgeon: Avila Herman MD;  Location: [facility] L&D OR;  Service: OB/GYN;  Laterality: N/A;    DILATION AND CURETTAGE OF UTERUS N/A     DILATION AND CURETTAGE OF UTERUS USING SUCTION N/A        PTA Medications   Medication Sig    albuterol (PROVENTIL) 2.5 mg /3 mL (0.083 %) nebulizer solution Take 3 mLs (2.5 mg total) by nebulization every 8 (eight) hours as needed for Wheezing or Shortness of Breath. Rescue    albuterol (PROVENTIL/VENTOLIN HFA) 90 mcg/actuation inhaler Inhale 2 puffs into the lungs every 6 (six) hours as needed for Wheezing. Rescue    ferrous sulfate 325 (65 FE) MG EC tablet Take 1 tablet (325 mg total) by mouth once daily.    NIFEdipine (PROCARDIA-XL) 30 MG (OSM) 24 hr tablet Take 1 tablet (30 mg total) by mouth once daily.    PNV,calcium 72-iron-folic acid (PRENATAL VITAMIN PLUS LOW IRON) 27 mg iron- 1 mg Tab Take 1 tablet (1 each total) by mouth once daily.    valACYclovir (VALTREX) 500 MG tablet Take 1 tablet (500 mg total) by mouth 2 (two) times daily.       Review of patient's allergies indicates:   Allergen Reactions    Dog dander     House dust     Albumen (egg white) Other (See Comments)    Cat/feline products         Family History    None       Tobacco Use    Smoking status: Never    Smokeless tobacco: Never   Substance and Sexual Activity    Alcohol use: Not Currently     Comment: occ    Drug use: Yes     Types: Marijuana    Sexual activity: Yes     Partners: Male     Review of Systems   Constitutional:  Negative for chills and fever.   Eyes:  Negative for visual disturbance.    Respiratory:  Negative for cough and wheezing.    Cardiovascular:  Negative for chest pain and palpitations.   Gastrointestinal:  Negative for abdominal pain, nausea and vomiting.   Genitourinary:  Negative for dysuria, frequency, hematuria, pelvic pain, vaginal bleeding, vaginal discharge and vaginal pain.   Neurological:  Negative for headaches.   Psychiatric/Behavioral:  Negative for depression.       Objective:     Vital Signs (Most Recent):  Temp: 98.2 °F (36.8 °C) (24 1139)  Pulse: 96 (24 1139)  Resp: 18 (24 1139)  BP: (!) 154/89 (24 1139)  SpO2: 97 % (24 1139) Vital Signs (24h Range):  Temp:  [97.7 °F (36.5 °C)-98.5 °F (36.9 °C)] 98.2 °F (36.8 °C)  Pulse:  [77-96] 96  Resp:  [17-20] 18  SpO2:  [96 %-99 %] 97 %  BP: (130-156)/(69-89) 154/89     Weight: 124.7 kg (275 lb)  Body mass index is 41.81 kg/m².    FHT: Cat 1 (reassuring)     Physical Exam:   Constitutional: She is oriented to person, place, and time. She appears well-developed and well-nourished. No distress.       Cardiovascular:  Normal rate.             Pulmonary/Chest: Effort normal.        Abdominal: Soft. She exhibits no distension.                 Neurological: She is alert and oriented to person, place, and time.    Skin: Skin is warm and dry.    Psychiatric: She has a normal mood and affect.        Cervix: deferred     Significant Labs:  Lab Results   Component Value Date    GROUPTRH O NEG 2024    HEPBSAG Non-reactive 2024    STREPBCULT No Group B Streptococcus isolated 2023       CBC:   Recent Labs   Lab 24  0636   WBC 7.62   RBC 3.59*   HGB 10.2*   HCT 31.0*      MCV 86   MCH 28.4   MCHC 32.9     I have personallly reviewed all pertinent lab results from the last 24 hours.  Assessment/Plan:     34 y.o. female  at 39w4d for:    * History of  Section  - Admit to Labor & Delivery  - Plans for RLTCS  - Place IV and start IVFs  - Type & Screen and CBC ordered STAT  -  Continuous fetal monitoring  - Notify anesthesia of patient arrival  - Postpartum Hemorrhage risk: Medium      Chronic hypertension during pregnancy, antepartum  Normal to mild range, control on procardia 30XL        Avila Herman MD  Obstetrics  [facility] [place] [facility]

## 2024-08-17 NOTE — PROGRESS NOTES
[facility]  Obstetrics  Postpartum Progress Note    Patient Name: Ole Allen  MRN: [id number]  Admission Date: 2024  Hospital Length of Stay: 1 days  Attending Physician: Avila Herman, *  Primary Care Provider: Umesh Mckeon MD    Subjective:     Principal Problem:Status post repeat low transverse  section    Hospital Course:  24 s/p RLTCD  24. POD # 1, doing well, BP NL to mild range, no preE symptoms    Interval History:     She is doing well this morning. She is tolerating a regular diet without nausea or vomiting. She is voiding spontaneously. She is ambulating. She has passed flatus, and has not a BM. Vaginal bleeding is mild. She denies fever or chills. Abdominal pain is mild and controlled with oral medications. She Is breastfeeding. She desires circumcision for her male baby: no.    Objective:     Vital Signs (Most Recent):  Temp: 98 °F (36.7 °C) (24 07)  Pulse: 95 (24 0744)  Resp: 17 (24 0744)  BP: 134/79 (24 0744)  SpO2: 96 % (24 07) Vital Signs (24h Range):  Temp:  [97.5 °F (36.4 °C)-98.5 °F (36.9 °C)] 98 °F (36.7 °C)  Pulse:  [77-99] 95  Resp:  [17-20] 17  SpO2:  [96 %-100 %] 96 %  BP: (127-162)/(62-92) 134/79     Weight: 124.7 kg (275 lb)  Body mass index is 41.81 kg/m².      Intake/Output Summary (Last 24 hours) at 2024 0928  Last data filed at 2024 0400  Gross per 24 hour   Intake 1120.91 ml   Output 3457 ml   Net -2336.09 ml         Significant Labs:  Lab Results   Component Value Date    GROUPTRH O NEG 2024    HEPBSAG Non-reactive 2024    STREPBCULT No Group B Streptococcus isolated 2023     Recent Labs   Lab 24  0636   HGB 10.2*   HCT 31.0*       I have personallly reviewed all pertinent lab results from the last 24 hours.  Recent Lab Results         24  0636   24  1112        ABO   O       Antibody Screen   NEG       Baso # 0.02   0.02       Basophil %  0.3   0.4       Differential Method Automated   Automated       Eos # 0.1   0.1       Eos % 1.7   1.9       Gran # (ANC) 5.8   3.6       Gran % 76.5   62.5       Hematocrit 31.0   33.3       Hemoglobin 10.2   11.0       Immature Grans (Abs) 0.04  Comment: Mild elevation in immature granulocytes is non specific and   can be seen in a variety of conditions including stress response,   acute inflammation, trauma and pregnancy. Correlation with other   laboratory and clinical findings is essential.     0.02  Comment: Mild elevation in immature granulocytes is non specific and   can be seen in a variety of conditions including stress response,   acute inflammation, trauma and pregnancy. Correlation with other   laboratory and clinical findings is essential.         Immature Granulocytes 0.5   0.4       Lymph # 1.1   1.7       Lymph % 15.0   29.5       MCH 28.4   28.1       MCHC 32.9   33.0       MCV 86   85       Mono # 0.5   0.3       Mono % 6.0   5.3       MPV 9.9   10.4       nRBC 0   0       Platelet Count 215   289       RBC 3.59   3.91       RDW 15.4   15.3       Rh Type   NEG       Treponema Pallidum Antibodies (IgG, IgM)   Nonreactive       WBC 7.62   5.69               Physical Exam:   Constitutional: She is oriented to person, place, and time. She appears well-developed and well-nourished.    HENT:   Head: Normocephalic and atraumatic.    Eyes: Pupils are equal, round, and reactive to light. EOM are normal.     Cardiovascular:       Exam reveals no clubbing and no cyanosis.        Pulmonary/Chest: Effort normal.        Abdominal: Soft. She exhibits abdominal incision. She exhibits no mass. There is no rebound and no guarding. No hernia.   Dressing d/c/I  Fundus firm and below umbilicus             Musculoskeletal: Normal range of motion and moves all extremeties.       Neurological: She is alert and oriented to person, place, and time.    Skin: Skin is warm. No cyanosis. Nails show no clubbing.    Psychiatric:  She has a normal mood and affect. Her behavior is normal. Thought content normal.       Review of Systems   Constitutional:  Negative for appetite change, chills and fever.   Respiratory:  Negative for shortness of breath.    Cardiovascular:  Negative for chest pain.   Gastrointestinal:  Negative for abdominal pain, blood in stool, constipation, diarrhea, nausea and vomiting.   Endocrine: Negative for hair loss and hot flashes.   Genitourinary:  Negative for decreased libido, dysmenorrhea, dyspareunia, dysuria, genital sores, menorrhagia, menstrual problem, pelvic pain, vaginal discharge, vaginal pain, urinary incontinence and vaginal odor.   Musculoskeletal:  Negative for back pain.   Integumentary:  Negative for rash, acne, hair changes, breast mass, nipple discharge and breast skin changes.   Breast: Negative for mass, mastodynia, nipple discharge and skin changes    Assessment/Plan:     34 y.o. female  for:    * Status post repeat low transverse  section  Routine pp care  Hgb 11 --> 10.2  Lactation following:  pt plan to breastfeed her  and 19 month old    Chronic hypertension during pregnancy, antepartum  Normal to mild range, control on procardia 30XL        Disposition: As patient meets milestones, will plan to discharge tomorrow.    Kyra Tovar MD  Obstetrics  [facility]

## 2024-08-17 NOTE — ASSESSMENT & PLAN NOTE
Routine pp care  Hgb 11 --> 10.2  Lactation following:  pt plan to breastfeed her  and 19 month old

## 2024-08-17 NOTE — L&D DELIVERY NOTE
[place] [facility]   Section   Operative Note    SUMMARY     Date of Procedure: 2024     Procedure: Procedure(s) (LRB):   SECTION (N/A)    Surgeons and Role:     * Avila Herman MD - Primary    Assistant: Radha Peacock PA-C    Pre-Operative Diagnosis: Third trimester pregnancy [Z34.93]    Post-Operative Diagnosis: Post-Op Diagnosis Codes:     * Third trimester pregnancy [Z34.93]    Anesthesia: Spinal/Epidural    Technical Procedures Used:   Vacuum Assisted Repeat Low Transverse  Section           Description of the Findings of the Procedure:   Fasical adhesions that required careful dissection  Bladder adhered to thin lower uterine segment  Male infant in cephalic presentation  Rectus abdominis muscles cut using bandage scissors to assist with delivery of infant  Vacuum used but popped off x1      Complications: No    Blood Loss: 307 cc     With patient in supine position, the legs are  and Dugan Catheter placed and positioning to supine done.   Abdomen prepped with Chloroprep and 3 minute drying time allowed prior to draping of the abdomen.   Time out taken with OR team members.  Pfannenstiel Incision made through the skin, transverse fascial incision developed, rectus muscles  in the midline and the peritoneum entered.   Dense fascial adhesions requiring sharp dissection was present  The bladder was adhered to thin lower uterine segment.  The lower uterine segment and position of the fetus identified.   Bladder flap taken down through transverse peritoneal incision.    Low Transverse Incision made through well developer lower uterine segment and extended laterally with blunt dissection.   Clear fluid noted. Vacuum was applied after attempted delivery through the hysterotomy. Rectus abdominis muscles were cut with bandage scissors bilaterally to create space for fetal head delivery.  Infant delivered from vertex presentation. Nuchal cord x2 was  present.  Cord clamped after one minute and  handed to attending nurse.  Cord blood taken, placenta delivered.  The uterus was exteriorized.  The edges of the uterine incision are grasped with Curry clamps at the angles and the inferior and superior midline edges of the incision.    Closure with running lock 0 Monocryl, starting at the left angle and tying on the right.  A second 0 Monocryl was used for an imbricating layer over the hysterotomy.  Observation for bleeding with suture of any bleeding along the hysterotomy line.   With good hemostasis noted, the anterior pelvis is rinsed with sterile saline.   Right and left adnexa with normal anatomy.     Closure of the abdomen with 2 0 Vicryl running of the peritoneum and rectus muscles, fascial closure with 0 Vicryl starting at the each angle and tying the knot in the midline angle.  3-0 plain gut was used to reapproximate the subcutaneous tissue.  Skin closure with 4 0 Monocryl subcuticular.  Wound dressed with Aquacel.            Specimens:   Specimen (24h ago, onward)      None            Condition: Good    VTE Risk Mitigation (From admission, onward)      None            Disposition: PACU - hemodynamically stable.    Attestation: Good         Delivery Information for Cherelle Allen    Birth information:  YOB: [birth year]24   Time of birth: 12:26 PM   Sex: female   Head Delivery Date/Time: 2024 12:26 PM   Delivery type: , Vacuum (Extractor)   Gestational Age: 39w4d        Delivery Providers    Delivering clinician: Avila Herman MD   Provider Role    Rhea San, ALESSANDROP Nurse Practitioner    Staci Ball RN Delivery Nurse    Ayesha Bates RN Registered Nurse    Ly Pittman RN Registered Nurse    Tawny Avelar Scrub Person    Marino Garg CRNA Nurse Anesthetist    Radha Peacock PA-C First Assist              Measurements    Weight: 3290 g  Weight (lbs): 7 lb 4.1  oz  Length:          Apgars    Living status: Living  Apgar Component Scores:  1 min.:  5 min.:  10 min.:  15 min.:  20 min.:    Skin color:  0  1       Heart rate:  2  2       Reflex irritability:  2  2       Muscle tone:  2  2       Respiratory effort:  2  2       Total:  8  9       Apgars assigned by: MAE VALLEJO         Operative Delivery    Forceps attempted?: No  Vacuum extractor attempted?: Yes  Vacuum type: Kiwi  Vacuum application location: None  First attempt time vacuum applied: 2024 12:24:52  First attempt time vacuum removed: 2024 12:24:55  Number of pop offs: 1  Number of pulls with vacuum: 1  Total vacuum application time: 3 seconds  Vacuum applied by:   Failed vacuum delivery?: Yes         Shoulder Dystocia    Shoulder dystocia present?: No           Presentation    Presentation: Vertex  Position: Middle Occiput Anterior           Interventions/Resuscitation    Method: Bulb Suctioning, Tactile Stimulation, NICU Attended       Cord    Vessels: 3 vessels  Complications: None  Delayed Cord Clamping?: Yes  Cord Clamped Date/Time: 2024 12:27 PM  Cord Blood Disposition: Sent with Baby, Lab  Gases Sent?: No  Stem Cell Collection (by MD): No       Placenta    Placenta delivery date/time: 2024 1227  Placenta removal: Manual removal  Placenta appearance: Intact  Placenta disposition: Discarded           Labor Events:       labor:       Labor Onset Date/Time:         Dilation Complete Date/Time:         Start Pushing Date/Time:         Start Pushing Date/Time:       Rupture Date/Time: 24  1223         Rupture type: ARM (Artificial Rupture)         Fluid Amount:       Fluid Color: Clear               steroids:       Antibiotics given for GBS:       Induction:       Indications for induction:        Augmentation:       Indications for augmentation:       Labor complications:       Additional complications:          Cervical ripening:                      Delivery:      Episiotomy:       Indication for Episiotomy:       Perineal Lacerations:   Repaired:      Periurethral Laceration:   Repaired:     Labial Laceration:   Repaired:     Sulcus Laceration:   Repaired:     Vaginal Laceration:   Repaired:     Cervical Laceration:   Repaired:     Repair suture:       Repair # of packets:       Last Value - EBL - Nursing (mL):       Sum - EBL - Nursing (mL): 0     Last Value - EBL - Anesthesia (mL):      Calculated QBL (mL): 307      Running total QBL (mL): 307      Vaginal Sweep Performed:       Surgicount Correct:       Vaginal Packing:   Quantity:       Other providers:       Anesthesia    Method: Spinal          Details (if applicable):  Trial of Labor No    Categorization: Repeat    Priority: Routine   Indications for : Prior Uterine Surgery;Repeat Section   Incision Type: low transverse     Additional  information:  Forceps:    Vacuum:    Breech:    Observed anomalies    Other (Comments):          [History reviewed] : History reviewed. [Medications and Allergies reviewed] : Medications and allergies reviewed.

## 2024-08-17 NOTE — PLAN OF CARE
Problem: Breastfeeding  Goal: Effective Breastfeeding  Outcome: Progressing  Intervention: Promote Breast Care and Comfort  Flowsheets (Taken 8/17/2024 1530)  Breast Care: Breastfeeding:   open to air   lanolin to nipples  Breast Pumping: double electric breast pump utilized  Intervention: Promote Effective Breastfeeding  Flowsheets (Taken 8/17/2024 1530)  Breastfeeding Assistance:   electric breast pump used   infant latch-on verified   feeding on demand promoted   feeding cue recognition promoted   infant suck/swallow verified   supplemental feeding provided   support offered  Parent-Child Attachment Promotion:   caring behavior modeled   cue recognition promoted   face-to-face positioning promoted   interaction encouraged   parent/caregiver presence encouraged   strengths emphasized   skin-to-skin contact encouraged   rooming-in promoted   positive reinforcement provided   participation in care promoted  Intervention: Support Exclusive Breastfeeding Success  Flowsheets (Taken 8/17/2024 1530)  Supportive Measures:   active listening utilized   decision-making supported   goal-setting facilitated   positive reinforcement provided   problem-solving facilitated   self-care encouraged   self-responsibility promoted   verbalization of feelings encouraged  Breastfeeding Support:   diary/feeding log utilized   encouragement provided   lactation counseling provided   maternal hydration promoted   infant-mother separation minimized   maternal nutrition promoted   maternal rest encouraged

## 2024-08-17 NOTE — HPI
Ole Allen is a 34 y.o.  female with IUP at 39w4d weeks gestation who is admitted for scheduled  Section.     This IUP is complicated by asthma, history fo C/Sx3, h/o HSV, sickle cell trait, Rh negative, and CHTN on procardia..  Patient denies contractions, denies vaginal bleeding, denies LOF.   Fetal Movement: normal.

## 2024-08-17 NOTE — SUBJECTIVE & OBJECTIVE
Interval History:     She is doing well this morning. She is tolerating a regular diet without nausea or vomiting. She is voiding spontaneously. She is ambulating. She has passed flatus, and has not a BM. Vaginal bleeding is mild. She denies fever or chills. Abdominal pain is mild and controlled with oral medications. She Is breastfeeding. She desires circumcision for her male baby: no.    Objective:     Vital Signs (Most Recent):  Temp: 98 °F (36.7 °C) (08/17/24 0744)  Pulse: 95 (08/17/24 0744)  Resp: 17 (08/17/24 0744)  BP: 134/79 (08/17/24 0744)  SpO2: 96 % (08/17/24 0744) Vital Signs (24h Range):  Temp:  [97.5 °F (36.4 °C)-98.5 °F (36.9 °C)] 98 °F (36.7 °C)  Pulse:  [77-99] 95  Resp:  [17-20] 17  SpO2:  [96 %-100 %] 96 %  BP: (127-162)/(62-92) 134/79     Weight: 124.7 kg (275 lb)  Body mass index is 41.81 kg/m².      Intake/Output Summary (Last 24 hours) at 8/17/2024 0928  Last data filed at 8/17/2024 0400  Gross per 24 hour   Intake 1120.91 ml   Output 3457 ml   Net -2336.09 ml         Significant Labs:  Lab Results   Component Value Date    GROUPTRH O NEG 06/21/2024    HEPBSAG Non-reactive 05/24/2024    STREPBCULT No Group B Streptococcus isolated 01/11/2023     Recent Labs   Lab 08/17/24  0636   HGB 10.2*   HCT 31.0*       I have personallly reviewed all pertinent lab results from the last 24 hours.  Recent Lab Results         08/17/24  0636   08/16/24  1112        ABO   O       Antibody Screen   NEG       Baso # 0.02   0.02       Basophil % 0.3   0.4       Differential Method Automated   Automated       Eos # 0.1   0.1       Eos % 1.7   1.9       Gran # (ANC) 5.8   3.6       Gran % 76.5   62.5       Hematocrit 31.0   33.3       Hemoglobin 10.2   11.0       Immature Grans (Abs) 0.04  Comment: Mild elevation in immature granulocytes is non specific and   can be seen in a variety of conditions including stress response,   acute inflammation, trauma and pregnancy. Correlation with other   laboratory and clinical  findings is essential.     0.02  Comment: Mild elevation in immature granulocytes is non specific and   can be seen in a variety of conditions including stress response,   acute inflammation, trauma and pregnancy. Correlation with other   laboratory and clinical findings is essential.         Immature Granulocytes 0.5   0.4       Lymph # 1.1   1.7       Lymph % 15.0   29.5       MCH 28.4   28.1       MCHC 32.9   33.0       MCV 86   85       Mono # 0.5   0.3       Mono % 6.0   5.3       MPV 9.9   10.4       nRBC 0   0       Platelet Count 215   289       RBC 3.59   3.91       RDW 15.4   15.3       Rh Type   NEG       Treponema Pallidum Antibodies (IgG, IgM)   Nonreactive       WBC 7.62   5.69               Physical Exam:   Constitutional: She is oriented to person, place, and time. She appears well-developed and well-nourished.    HENT:   Head: Normocephalic and atraumatic.    Eyes: Pupils are equal, round, and reactive to light. EOM are normal.     Cardiovascular:       Exam reveals no clubbing and no cyanosis.        Pulmonary/Chest: Effort normal.        Abdominal: Soft. She exhibits abdominal incision. She exhibits no mass. There is no rebound and no guarding. No hernia.   Dressing d/c/I  Fundus firm and below umbilicus             Musculoskeletal: Normal range of motion and moves all extremeties.       Neurological: She is alert and oriented to person, place, and time.    Skin: Skin is warm. No cyanosis. Nails show no clubbing.    Psychiatric: She has a normal mood and affect. Her behavior is normal. Thought content normal.       Review of Systems   Constitutional:  Negative for appetite change, chills and fever.   Respiratory:  Negative for shortness of breath.    Cardiovascular:  Negative for chest pain.   Gastrointestinal:  Negative for abdominal pain, blood in stool, constipation, diarrhea, nausea and vomiting.   Endocrine: Negative for hair loss and hot flashes.   Genitourinary:  Negative for decreased  libido, dysmenorrhea, dyspareunia, dysuria, genital sores, menorrhagia, menstrual problem, pelvic pain, vaginal discharge, vaginal pain, urinary incontinence and vaginal odor.   Musculoskeletal:  Negative for back pain.   Integumentary:  Negative for rash, acne, hair changes, breast mass, nipple discharge and breast skin changes.   Breast: Negative for mass, mastodynia, nipple discharge and skin changes

## 2024-08-17 NOTE — SUBJECTIVE & OBJECTIVE
OB History    Para Term  AB Living   6 4 4 0 2 4   SAB IAB Ectopic Multiple Live Births   0 2 0 0 4      # Outcome Date GA Lbr Zack/2nd Weight Sex Type Anes PTL Lv   6 Term 24 39w4d  3.29 kg (7 lb 4.1 oz) F , V Spinal  CLARIBEL      Name: Cherelle Allen      Apgar1: 8  Apgar5: 9   5 Term 23 39w3d  3.49 kg (7 lb 11.1 oz) F CS-LTranv Spinal N CLARIBEL      Name: GILAR,GIRL LELO SOLORZANO      Apgar1: 5  Apgar5: 9   4 Term 10/22/21 41w1d  3.459 kg (7 lb 10 oz) M CS-LTranv Spinal N CLARIBEL      Complications: Gestational hypertension without significant proteinuria during pregnancy in third trimester, antepartum   3 Term 06 39w0d  3.487 kg (7 lb 11 oz) M CS-LTranv  N CLARIBEL      Birth Comments: failed IOL, preE      Complications: Preeclampsia, third trimester   2 IAB            1 IAB               Obstetric Comments   Gynhx: reg   H/o genital herpes, chlamydia     Past Medical History:   Diagnosis Date    Asthma     Hemoglobin S trait     Herpes     Hypertension      Past Surgical History:   Procedure Laterality Date    ANTERIOR CRUCIATE LIGAMENT REPAIR       SECTION       SECTION N/A 10/22/2021    Procedure:  SECTION;  Surgeon: Julie R. Jeansonne, MD;  Location: [facility];  Service: OB/GYN;  Laterality: N/A;     SECTION N/A 10/22/2021    Procedure:  SECTION;  Surgeon: Sissy Jean MD;  Location: [facility] L&D;  Service: OB/GYN;  Laterality: N/A;     SECTION N/A 2/3/2023    Procedure:  SECTION;  Surgeon: Avila Herman MD;  Location: [facility] L&D OR;  Service: OB/GYN;  Laterality: N/A;     SECTION N/A 2024    Procedure:  SECTION;  Surgeon: Avila Herman MD;  Location: [facility] L&D OR;  Service: OB/GYN;  Laterality: N/A;    DILATION AND CURETTAGE OF UTERUS N/A     DILATION AND CURETTAGE OF UTERUS USING SUCTION N/A        PTA Medications   Medication Sig    albuterol (PROVENTIL) 2.5 mg /3 mL  (0.083 %) nebulizer solution Take 3 mLs (2.5 mg total) by nebulization every 8 (eight) hours as needed for Wheezing or Shortness of Breath. Rescue    albuterol (PROVENTIL/VENTOLIN HFA) 90 mcg/actuation inhaler Inhale 2 puffs into the lungs every 6 (six) hours as needed for Wheezing. Rescue    ferrous sulfate 325 (65 FE) MG EC tablet Take 1 tablet (325 mg total) by mouth once daily.    NIFEdipine (PROCARDIA-XL) 30 MG (OSM) 24 hr tablet Take 1 tablet (30 mg total) by mouth once daily.    PNV,calcium 72-iron-folic acid (PRENATAL VITAMIN PLUS LOW IRON) 27 mg iron- 1 mg Tab Take 1 tablet (1 each total) by mouth once daily.    valACYclovir (VALTREX) 500 MG tablet Take 1 tablet (500 mg total) by mouth 2 (two) times daily.       Review of patient's allergies indicates:   Allergen Reactions    Dog dander     House dust     Albumen (egg white) Other (See Comments)    Cat/feline products         Family History    None       Tobacco Use    Smoking status: Never    Smokeless tobacco: Never   Substance and Sexual Activity    Alcohol use: Not Currently     Comment: occ    Drug use: Yes     Types: Marijuana    Sexual activity: Yes     Partners: Male     Review of Systems   Constitutional:  Negative for chills and fever.   Eyes:  Negative for visual disturbance.   Respiratory:  Negative for cough and wheezing.    Cardiovascular:  Negative for chest pain and palpitations.   Gastrointestinal:  Negative for abdominal pain, nausea and vomiting.   Genitourinary:  Negative for dysuria, frequency, hematuria, pelvic pain, vaginal bleeding, vaginal discharge and vaginal pain.   Neurological:  Negative for headaches.   Psychiatric/Behavioral:  Negative for depression.       Objective:     Vital Signs (Most Recent):  Temp: 98.2 °F (36.8 °C) (08/17/24 1139)  Pulse: 96 (08/17/24 1139)  Resp: 18 (08/17/24 1139)  BP: (!) 154/89 (08/17/24 1139)  SpO2: 97 % (08/17/24 1139) Vital Signs (24h Range):  Temp:  [97.7 °F (36.5 °C)-98.5 °F (36.9 °C)] 98.2  °F (36.8 °C)  Pulse:  [77-96] 96  Resp:  [17-20] 18  SpO2:  [96 %-99 %] 97 %  BP: (130-156)/(69-89) 154/89     Weight: 124.7 kg (275 lb)  Body mass index is 41.81 kg/m².    FHT: Cat 1 (reassuring)     Physical Exam:   Constitutional: She is oriented to person, place, and time. She appears well-developed and well-nourished. No distress.       Cardiovascular:  Normal rate.             Pulmonary/Chest: Effort normal.        Abdominal: Soft. She exhibits no distension.                 Neurological: She is alert and oriented to person, place, and time.    Skin: Skin is warm and dry.    Psychiatric: She has a normal mood and affect.        Cervix: deferred     Significant Labs:  Lab Results   Component Value Date    GROUPTRH O NEG 06/21/2024    HEPBSAG Non-reactive 05/24/2024    STREPBCULT No Group B Streptococcus isolated 01/11/2023       CBC:   Recent Labs   Lab 08/17/24  0636   WBC 7.62   RBC 3.59*   HGB 10.2*   HCT 31.0*      MCV 86   MCH 28.4   MCHC 32.9     I have personallly reviewed all pertinent lab results from the last 24 hours.

## 2024-08-17 NOTE — PLAN OF CARE
Plan of care discussed with patient and significant other.  Patient and significant other verbalized understanding of plan of care and all of their questions were answered.

## 2024-08-17 NOTE — LACTATION NOTE
"   08/17/24 1530   Maternal Assessment   Breast Density Bilateral:;soft   Areola Bilateral:;elastic   Nipples Bilateral:;everted   Maternal Infant Feeding   Maternal Emotional State independent;relaxed   Signs of Milk Transfer audible swallow;infant jaw motion present   Pain with Feeding no   Latch Assistance no   Equipment Type   Breast Pump Type double electric, hospital grade   Breast Pump Flange Type hard   Breast Pump Flange Size 24 mm   Breast Pumping   Breast Pumping Interventions frequent pumping encouraged   Breast Pumping double electric breast pump utilized     Infant is sleeping after bath. Patient states she is breastfeeding and only pumped once but "didn't get anything".  States she does not have any pain when breastfeeding.  States nurses well, deep sucking. Offered support as needed. Verbalized understanding.  "

## 2024-08-18 ENCOUNTER — PATIENT MESSAGE (OUTPATIENT)
Dept: LACTATION | Facility: CLINIC | Age: 34
End: 2024-08-18
Payer: MEDICAID

## 2024-08-18 VITALS
TEMPERATURE: 98 F | SYSTOLIC BLOOD PRESSURE: 151 MMHG | DIASTOLIC BLOOD PRESSURE: 92 MMHG | RESPIRATION RATE: 17 BRPM | OXYGEN SATURATION: 96 % | WEIGHT: 275 LBS | HEIGHT: 68 IN | HEART RATE: 100 BPM | BODY MASS INDEX: 41.68 KG/M2

## 2024-08-18 PROCEDURE — 99238 HOSP IP/OBS DSCHRG MGMT 30/<: CPT | Mod: ,,, | Performed by: OBSTETRICS & GYNECOLOGY

## 2024-08-18 PROCEDURE — 25000003 PHARM REV CODE 250: Performed by: OBSTETRICS & GYNECOLOGY

## 2024-08-18 RX ORDER — HYDROCODONE BITARTRATE AND ACETAMINOPHEN 5; 325 MG/1; MG/1
1 TABLET ORAL EVERY 6 HOURS PRN
Qty: 30 TABLET | Refills: 0 | Status: SHIPPED | OUTPATIENT
Start: 2024-08-18

## 2024-08-18 RX ORDER — IBUPROFEN 600 MG/1
600 TABLET ORAL EVERY 6 HOURS PRN
Qty: 30 TABLET | Refills: 0 | Status: SHIPPED | OUTPATIENT
Start: 2024-08-18

## 2024-08-18 RX ORDER — DOCUSATE SODIUM 100 MG/1
100 CAPSULE, LIQUID FILLED ORAL 2 TIMES DAILY
Qty: 60 CAPSULE | Refills: 2 | Status: SHIPPED | OUTPATIENT
Start: 2024-08-18 | End: 2025-08-18

## 2024-08-18 RX ADMIN — MAGNESIUM HYDROXIDE 2400 MG: 400 SUSPENSION ORAL at 12:08

## 2024-08-18 RX ADMIN — DOCUSATE SODIUM 200 MG: 100 CAPSULE, LIQUID FILLED ORAL at 09:08

## 2024-08-18 RX ADMIN — NIFEDIPINE 30 MG: 30 TABLET, FILM COATED, EXTENDED RELEASE ORAL at 09:08

## 2024-08-18 RX ADMIN — IBUPROFEN 600 MG: 600 TABLET ORAL at 05:08

## 2024-08-18 RX ADMIN — FERROUS SULFATE TAB 325 MG (65 MG ELEMENTAL FE) 1 EACH: 325 (65 FE) TAB at 09:08

## 2024-08-18 RX ADMIN — PRENATAL VIT W/ FE FUMARATE-FA TAB 27-0.8 MG 1 TABLET: 27-0.8 TAB at 09:08

## 2024-08-18 RX ADMIN — HYDROCODONE BITARTRATE AND ACETAMINOPHEN 1 TABLET: 10; 325 TABLET ORAL at 04:08

## 2024-08-18 RX ADMIN — MUPIROCIN: 20 OINTMENT TOPICAL at 09:08

## 2024-08-18 RX ADMIN — HYDROCODONE BITARTRATE AND ACETAMINOPHEN 1 TABLET: 10; 325 TABLET ORAL at 12:08

## 2024-08-18 RX ADMIN — IBUPROFEN 600 MG: 600 TABLET ORAL at 11:08

## 2024-08-18 NOTE — LACTATION NOTE
08/18/24 1215   Maternal Assessment   Breast Density Bilateral:;soft   Areola Bilateral:;elastic   Nipples Bilateral:;everted   Maternal Infant Feeding   Maternal Emotional State independent;relaxed   Infant Positioning cradle   Signs of Milk Transfer audible swallow   Pain with Feeding no   Latch Assistance no     Patient states she only pumped once.  States breastfeeding is going well.  Infant latch is much deeper now and she does not have any pain with latch.  Patient would like to be discharged today. Lactation discharge instructions given to parent. Discussed infants voiding and stool pattern and what stool should look like if exclusively breastfeeding or giving formula.  Instructed patient to continue taking prenatal vitamins and that some medications may cross into breast milk and to check with her physician or pharmacist before taking any medication.  Discussed signs and symptoms of engorgement and mastitis and when to call the physician.  Instructed patient that the baby should only receive breast milk or formula for the first 6 months.  No water or juice.  Instructed the patient to feed the baby or pump 8 or more times in 24 hours.  Discussed pumping and how to store EBM.  Discussed how to thaw/warm EBM. Verbalized understanding.  All questions answered.  Lactation discharge instructions completed.

## 2024-08-18 NOTE — PLAN OF CARE
PT stable. VS WNL. Voiding and ambulating independently. Fundus firm, midline 1 below, light bleeding.  Abdominal dressing dry, intact with small drainage.  Pain managed with ibuprofen 600, and hydrocodone-acetaminophen 10.  Tolerating regular diet.  Bonding well with baby.  Pt doing well.

## 2024-08-18 NOTE — PROGRESS NOTES
Discharge teaching given to pt. All questions answered. Enc. Pt. To call MD office once discharged for any questions or concerns. RX and f/u appt. reviewed with pt., no questions at this time.

## 2024-08-18 NOTE — DISCHARGE SUMMARY
[facility]  Obstetrics  Discharge Summary      Patient Name: Ole Allen  MRN: [id number]  Admission Date: 2024  Hospital Length of Stay: 2 days  Discharge Date and Time:  2024 9:31 AM  Attending Physician: Tracie Miller, *   Discharging Provider: Kyra Tovar MD   Primary Care Provider: Umesh Mckeon MD    HPI:  Ole Allen is a 34 y.o.  female with IUP at 39w4d weeks gestation who is admitted for scheduled  Section.     This IUP is complicated by asthma, history fo C/Sx3, h/o HSV, sickle cell trait, Rh negative, and CHTN on procardia..  Patient denies contractions, denies vaginal bleeding, denies LOF.   Fetal Movement: normal.          Procedure(s) (LRB):   SECTION (N/A)     Hospital Course:   24 s/p RLTCD  24. POD # 1, doing well, BP NL to mild range, no preE symptoms  .  POD # 2. BP mild range. No preE symptoms. Pt underwent a routine postpartum course.  Her lochia was appropriate.  And pain is controlled with medication.  Her vital signs were stable and she remained afebrile.  Pt was discharged to home in stable condition.         Consults (From admission, onward)          Status Ordering Provider     Inpatient consult to Lactation  Once        Provider:  (Not yet assigned)    Acknowledged TRACIE MILLER            Final Active Diagnoses:    Diagnosis Date Noted POA    PRINCIPAL PROBLEM:  Status post repeat low transverse  section [Z98.891] 2024 Not Applicable    Chronic hypertension during pregnancy, antepartum [O10.919] 2024 Yes      Problems Resolved During this Admission:        Significant Diagnostic Studies: Labs: CBC   Recent Labs   Lab 24  1112 24  0636   WBC 5.69 7.62   HGB 11.0* 10.2*   HCT 33.3* 31.0*    215         Feeding Method: breast    Immunizations       Date Immunization Status Dose Route/Site Given by    24 1359 MMR Incomplete 0.5 mL  Subcutaneous/     24 1359 Tdap Incomplete 0.5 mL Intramuscular/     24 1509 Rho (D) Immune Globulin - IM Deleted 300 mcg Intramuscular/             Delivery:    Episiotomy:     Lacerations:     Repair suture:     Repair # of packets:     Blood loss (ml):       Birth information:  YOB: 2026   Time of birth: 12:26 PM   Sex: female   Delivery type: , Vacuum (Extractor)   Gestational Age: 39w4d     Measurements    Weight: 3290 g  Weight (lbs): 7 lb 4.1 oz  Length:          Delivery Clinician: Delivery Providers    Delivering clinician: Avila Herman MD   Provider Role    Rhea San NNP Nurse Practitioner    Staci Ball RN Delivery Nurse    Ayesha Bates, RN Registered Nurse    Ly Pittman RN Registered Nurse    Tawny Avelar Scrub Person    Marino Garg, CRNA Nurse Anesthetist    Radha Peacock PA-C [facility]             Additional  information:  Forceps:    Vacuum:    Breech:    Observed anomalies      Living?:     Apgars    Living status: Living  Apgar Component Scores:  1 min.:  5 min.:  10 min.:  15 min.:  20 min.:    Skin color:  0  1       Heart rate:  2  2       Reflex irritability:  2  2       Muscle tone:  2  2       Respiratory effort:  2  2       Total:  8  9       Apgars assigned by: MAE VALLEJO         Placenta: Delivered:       appearance  Pending Diagnostic Studies:       None            Discharged Condition: good    Disposition: Home or Self Care    Follow Up:   Follow-up Information       Avila Herman MD Follow up in 1 week(s).    Specialty: Obstetrics and Gynecology  Why: For wound re-check  Contact information:  [address]  [address]  [facility] [id number] [phone]                           Patient Instructions:      BREAST PUMP FOR HOME USE     Order Specific Question Answer Comments   Type of pump: Electric    Weight: 124.7 kg (275 lb)    Length of need (1-99 months): 99      Diet  Adult Regular     Pelvic Rest     Notify your health care provider if you experience any of the following:  increased confusion or weakness     Notify your health care provider if you experience any of the following:  persistent dizziness, light-headedness, or visual disturbances     Notify your health care provider if you experience any of the following:  worsening rash     Notify your health care provider if you experience any of the following:  severe persistent headache     Notify your health care provider if you experience any of the following:  difficulty breathing or increased cough     Notify your health care provider if you experience any of the following:  redness, tenderness, or signs of infection (pain, swelling, redness, odor or green/yellow discharge around incision site)     Notify your health care provider if you experience any of the following:  severe uncontrolled pain     Notify your health care provider if you experience any of the following:  persistent nausea and vomiting or diarrhea     Notify your health care provider if you experience any of the following:  temperature >100.4     Leave dressing on - Keep it clean, dry, and intact until clinic visit     Activity as tolerated     Medications:  Current Discharge Medication List        START taking these medications    Details   docusate sodium (COLACE) 100 MG capsule Take 1 capsule (100 mg total) by mouth 2 (two) times daily.  Qty: 60 capsule, Refills: 2      HYDROcodone-acetaminophen (NORCO) 5-325 mg per tablet Take 1 tablet by mouth every 6 (six) hours as needed for Pain.  Qty: 30 tablet, Refills: 0    Comments: Quantity prescribed more than 7 day supply? Yes, quantity medically necessary      ibuprofen (ADVIL,MOTRIN) 600 MG tablet Take 1 tablet (600 mg total) by mouth every 6 (six) hours as needed for Pain.  Qty: 30 tablet, Refills: 0           CONTINUE these medications which have NOT CHANGED    Details   albuterol (PROVENTIL) 2.5 mg /3 mL  (0.083 %) nebulizer solution Take 3 mLs (2.5 mg total) by nebulization every 8 (eight) hours as needed for Wheezing or Shortness of Breath. Rescue  Qty: 270 mL, Refills: 0    Associated Diagnoses: Asthma affecting pregnancy in third trimester      albuterol (PROVENTIL/VENTOLIN HFA) 90 mcg/actuation inhaler Inhale 2 puffs into the lungs every 6 (six) hours as needed for Wheezing. Rescue  Qty: 6.7 g, Refills: 0    Associated Diagnoses: Asthma affecting pregnancy in third trimester      ferrous sulfate 325 (65 FE) MG EC tablet Take 1 tablet (325 mg total) by mouth once daily.  Qty: 90 tablet, Refills: 1    Associated Diagnoses: Antepartum anemia      NIFEdipine (PROCARDIA-XL) 30 MG (OSM) 24 hr tablet Take 1 tablet (30 mg total) by mouth once daily.  Qty: 90 tablet, Refills: 3    Comments: .  Associated Diagnoses: Chronic hypertension during pregnancy, antepartum      PNV,calcium 72-iron-folic acid (PRENATAL VITAMIN PLUS LOW IRON) 27 mg iron- 1 mg Tab Take 1 tablet (1 each total) by mouth once daily.  Qty: 90 tablet, Refills: 3    Comments: Please substitute to comparable prenatal vitamins if not covered by pt insurance plan or per pt request.  Associated Diagnoses: Positive pregnancy test      valACYclovir (VALTREX) 500 MG tablet Take 1 tablet (500 mg total) by mouth 2 (two) times daily.  Qty: 60 tablet, Refills: 11    Associated Diagnoses: Herpes simplex type 2 (HSV-2) infection affecting pregnancy, antepartum             Kyra Tovar MD  Obstetrics  [facility]

## 2024-08-19 NOTE — ANESTHESIA POSTPROCEDURE EVALUATION
Anesthesia Post Evaluation    Patient: Ole Allen    Procedure(s) Performed: Procedure(s) (LRB):   SECTION (N/A)    Final Anesthesia Type: spinal      Patient location during evaluation: floor  Patient participation: Yes- Able to Participate  Level of consciousness: awake and alert  Post-procedure vital signs: reviewed and stable  Pain management: adequate  Airway patency: patent    PONV status at discharge: No PONV  Anesthetic complications: no      Cardiovascular status: hemodynamically stable and blood pressure returned to baseline  Respiratory status: unassisted, room air and spontaneous ventilation  Hydration status: euvolemic  Follow-up not needed.              Vitals Value Taken Time   /92 24 0752   Temp 36.7 °C (98.1 °F) 24 0752   Pulse 100 24 0752   Resp 17 24 0752   SpO2 96 % 24 0752         Event Time   Out of Recovery 14:30:00         Pain/Sandy Score: Pain Rating Prior to Med Admin: 0 (2024 11:08 AM)  Pain Rating Post Med Admin: 3 (2024  6:20 AM)

## 2024-08-23 ENCOUNTER — HOSPITAL ENCOUNTER (OUTPATIENT)
Facility: HOSPITAL | Age: 34
Discharge: HOME | End: 2024-08-23
Attending: EMERGENCY MEDICINE | Admitting: OBSTETRICS & GYNECOLOGY
Payer: MEDICAID

## 2024-08-23 ENCOUNTER — NURSE TRIAGE (OUTPATIENT)
Dept: ADMINISTRATIVE | Facility: CLINIC | Age: 34
End: 2024-08-23
Payer: MEDICAID

## 2024-08-23 ENCOUNTER — TELEPHONE (OUTPATIENT)
Dept: OBSTETRICS AND GYNECOLOGY | Facility: CLINIC | Age: 34
End: 2024-08-23
Payer: MEDICAID

## 2024-08-23 ENCOUNTER — ANESTHESIA EVENT (OUTPATIENT)
Dept: OBSTETRICS AND GYNECOLOGY | Facility: HOSPITAL | Age: 34
End: 2024-08-23

## 2024-08-23 ENCOUNTER — ANESTHESIA (OUTPATIENT)
Dept: OBSTETRICS AND GYNECOLOGY | Facility: HOSPITAL | Age: 34
End: 2024-08-23

## 2024-08-23 VITALS
OXYGEN SATURATION: 98 % | HEIGHT: 68 IN | DIASTOLIC BLOOD PRESSURE: 87 MMHG | HEART RATE: 82 BPM | WEIGHT: 269.31 LBS | TEMPERATURE: 98 F | BODY MASS INDEX: 40.82 KG/M2 | SYSTOLIC BLOOD PRESSURE: 146 MMHG | RESPIRATION RATE: 18 BRPM

## 2024-08-23 DIAGNOSIS — I10 HYPERTENSION, UNSPECIFIED TYPE: Primary | ICD-10-CM

## 2024-08-23 DIAGNOSIS — R51.9 NONINTRACTABLE HEADACHE, UNSPECIFIED CHRONICITY PATTERN, UNSPECIFIED HEADACHE TYPE: ICD-10-CM

## 2024-08-23 LAB
ALBUMIN SERPL BCP-MCNC: 3.2 G/DL (ref 3.5–5.2)
ALP SERPL-CCNC: 114 U/L (ref 55–135)
ALT SERPL W/O P-5'-P-CCNC: 14 U/L (ref 10–44)
ANION GAP SERPL CALC-SCNC: 11 MMOL/L (ref 8–16)
AST SERPL-CCNC: 15 U/L (ref 10–40)
BASOPHILS # BLD AUTO: 0.03 K/UL (ref 0–0.2)
BASOPHILS NFR BLD: 0.4 % (ref 0–1.9)
BILIRUB SERPL-MCNC: 0.4 MG/DL (ref 0.1–1)
BILIRUB UR QL STRIP: NEGATIVE
BUN SERPL-MCNC: 5 MG/DL (ref 6–20)
CALCIUM SERPL-MCNC: 9.3 MG/DL (ref 8.7–10.5)
CHLORIDE SERPL-SCNC: 105 MMOL/L (ref 95–110)
CLARITY UR: CLEAR
CO2 SERPL-SCNC: 23 MMOL/L (ref 23–29)
COLOR UR: COLORLESS
CREAT SERPL-MCNC: 0.6 MG/DL (ref 0.5–1.4)
CREAT UR-MCNC: 28.8 MG/DL (ref 15–325)
DIFFERENTIAL METHOD BLD: ABNORMAL
EOSINOPHIL # BLD AUTO: 0.3 K/UL (ref 0–0.5)
EOSINOPHIL NFR BLD: 4.5 % (ref 0–8)
ERYTHROCYTE [DISTWIDTH] IN BLOOD BY AUTOMATED COUNT: 15.6 % (ref 11.5–14.5)
EST. GFR  (NO RACE VARIABLE): >60 ML/MIN/1.73 M^2
GLUCOSE SERPL-MCNC: 71 MG/DL (ref 70–110)
GLUCOSE UR QL STRIP: NEGATIVE
HCT VFR BLD AUTO: 32.6 % (ref 37–48.5)
HGB BLD-MCNC: 10.4 G/DL (ref 12–16)
HGB UR QL STRIP: ABNORMAL
IMM GRANULOCYTES # BLD AUTO: 0.03 K/UL (ref 0–0.04)
IMM GRANULOCYTES NFR BLD AUTO: 0.4 % (ref 0–0.5)
KETONES UR QL STRIP: ABNORMAL
LEUKOCYTE ESTERASE UR QL STRIP: ABNORMAL
LYMPHOCYTES # BLD AUTO: 1.5 K/UL (ref 1–4.8)
LYMPHOCYTES NFR BLD: 21.1 % (ref 18–48)
MCH RBC QN AUTO: 28 PG (ref 27–31)
MCHC RBC AUTO-ENTMCNC: 31.9 G/DL (ref 32–36)
MCV RBC AUTO: 88 FL (ref 82–98)
MICROSCOPIC COMMENT: ABNORMAL
MONOCYTES # BLD AUTO: 0.5 K/UL (ref 0.3–1)
MONOCYTES NFR BLD: 6.6 % (ref 4–15)
NEUTROPHILS # BLD AUTO: 4.7 K/UL (ref 1.8–7.7)
NEUTROPHILS NFR BLD: 67 % (ref 38–73)
NITRITE UR QL STRIP: NEGATIVE
NRBC BLD-RTO: 0 /100 WBC
PH UR STRIP: 8 [PH] (ref 5–8)
PLATELET # BLD AUTO: 323 K/UL (ref 150–450)
PMV BLD AUTO: 10.3 FL (ref 9.2–12.9)
POTASSIUM SERPL-SCNC: 3.9 MMOL/L (ref 3.5–5.1)
PROT SERPL-MCNC: 7.3 G/DL (ref 6–8.4)
PROT UR QL STRIP: NEGATIVE
PROT UR-MCNC: <7 MG/DL
PROT/CREAT UR: NORMAL MG/G{CREAT} (ref 0–0.2)
RBC # BLD AUTO: 3.71 M/UL (ref 4–5.4)
RBC #/AREA URNS HPF: 30 /HPF (ref 0–4)
SODIUM SERPL-SCNC: 139 MMOL/L (ref 136–145)
SP GR UR STRIP: 1.01 (ref 1–1.03)
SQUAMOUS #/AREA URNS HPF: 1 /HPF
URATE SERPL-MCNC: 4.8 MG/DL (ref 2.4–5.7)
URN SPEC COLLECT METH UR: ABNORMAL
UROBILINOGEN UR STRIP-ACNC: NEGATIVE EU/DL
WBC # BLD AUTO: 6.96 K/UL (ref 3.9–12.7)
WBC #/AREA URNS HPF: 4 /HPF (ref 0–5)

## 2024-08-23 PROCEDURE — 85025 COMPLETE CBC W/AUTO DIFF WBC: CPT | Performed by: OBSTETRICS & GYNECOLOGY

## 2024-08-23 PROCEDURE — 84550 ASSAY OF BLOOD/URIC ACID: CPT | Performed by: OBSTETRICS & GYNECOLOGY

## 2024-08-23 PROCEDURE — 84156 ASSAY OF PROTEIN URINE: CPT | Performed by: EMERGENCY MEDICINE

## 2024-08-23 PROCEDURE — 25000003 PHARM REV CODE 250: Performed by: OBSTETRICS & GYNECOLOGY

## 2024-08-23 PROCEDURE — 99285 EMERGENCY DEPT VISIT HI MDM: CPT

## 2024-08-23 PROCEDURE — 80053 COMPREHEN METABOLIC PANEL: CPT | Performed by: OBSTETRICS & GYNECOLOGY

## 2024-08-23 PROCEDURE — 81000 URINALYSIS NONAUTO W/SCOPE: CPT | Performed by: OBSTETRICS & GYNECOLOGY

## 2024-08-23 RX ORDER — NIFEDIPINE 30 MG/1
30 TABLET, EXTENDED RELEASE ORAL DAILY
Status: DISCONTINUED | OUTPATIENT
Start: 2024-08-23 | End: 2024-08-23 | Stop reason: HOSPADM

## 2024-08-23 RX ORDER — LABETALOL HYDROCHLORIDE 5 MG/ML
40 INJECTION, SOLUTION INTRAVENOUS ONCE
Status: DISCONTINUED | OUTPATIENT
Start: 2024-08-23 | End: 2024-08-23 | Stop reason: HOSPADM

## 2024-08-23 RX ORDER — IBUPROFEN 600 MG/1
600 TABLET ORAL EVERY 6 HOURS PRN
Status: DISCONTINUED | OUTPATIENT
Start: 2024-08-23 | End: 2024-08-23

## 2024-08-23 RX ORDER — IBUPROFEN 400 MG/1
800 TABLET ORAL EVERY 6 HOURS PRN
Status: DISCONTINUED | OUTPATIENT
Start: 2024-08-23 | End: 2024-08-23 | Stop reason: HOSPADM

## 2024-08-23 RX ORDER — HYDROCODONE BITARTRATE AND ACETAMINOPHEN 5; 325 MG/1; MG/1
1 TABLET ORAL EVERY 6 HOURS PRN
Status: DISCONTINUED | OUTPATIENT
Start: 2024-08-23 | End: 2024-08-23 | Stop reason: HOSPADM

## 2024-08-23 RX ORDER — ACETAMINOPHEN 500 MG
1000 TABLET ORAL ONCE
Status: COMPLETED | OUTPATIENT
Start: 2024-08-23 | End: 2024-08-23

## 2024-08-23 RX ORDER — ACETAMINOPHEN 500 MG
1000 TABLET ORAL ONCE
Status: DISCONTINUED | OUTPATIENT
Start: 2024-08-23 | End: 2024-08-23

## 2024-08-23 RX ORDER — LABETALOL HYDROCHLORIDE 5 MG/ML
20 INJECTION, SOLUTION INTRAVENOUS ONCE
Status: DISCONTINUED | OUTPATIENT
Start: 2024-08-23 | End: 2024-08-23 | Stop reason: HOSPADM

## 2024-08-23 RX ORDER — HYDROCODONE BITARTRATE AND ACETAMINOPHEN 5; 325 MG/1; MG/1
1 TABLET ORAL EVERY 6 HOURS PRN
Status: DISCONTINUED | OUTPATIENT
Start: 2024-08-23 | End: 2024-08-23

## 2024-08-23 RX ORDER — HYDRALAZINE HYDROCHLORIDE 20 MG/ML
10 INJECTION INTRAMUSCULAR; INTRAVENOUS ONCE
Status: DISCONTINUED | OUTPATIENT
Start: 2024-08-23 | End: 2024-08-23 | Stop reason: HOSPADM

## 2024-08-23 RX ORDER — MAGNESIUM SULFATE HEPTAHYDRATE 40 MG/ML
4 INJECTION, SOLUTION INTRAVENOUS ONCE
Status: DISCONTINUED | OUTPATIENT
Start: 2024-08-23 | End: 2024-08-23

## 2024-08-23 RX ORDER — LABETALOL HYDROCHLORIDE 5 MG/ML
80 INJECTION, SOLUTION INTRAVENOUS ONCE
Status: DISCONTINUED | OUTPATIENT
Start: 2024-08-23 | End: 2024-08-23 | Stop reason: HOSPADM

## 2024-08-23 RX ADMIN — IBUPROFEN 800 MG: 400 TABLET ORAL at 12:08

## 2024-08-23 RX ADMIN — NIFEDIPINE 30 MG: 30 TABLET, FILM COATED, EXTENDED RELEASE ORAL at 12:08

## 2024-08-23 RX ADMIN — HYDROCODONE BITARTRATE AND ACETAMINOPHEN 1 TABLET: 5; 325 TABLET ORAL at 12:08

## 2024-08-23 RX ADMIN — ACETAMINOPHEN 1000 MG: 500 TABLET ORAL at 11:08

## 2024-08-23 NOTE — ED PROVIDER NOTES
Encounter Date: 2024       History     Chief Complaint   Patient presents with    Headache    Hypertension     Pt presents to ER with complaints of a severe HA since yesterday and an elevated b/p. Pt states she has a 1 week old and was pre eclamptic during her pregnancy. Pt states the 24 hour RN told her to come to there ER. Pt denies SOB, chest pain and any other issues at this time.      34-year-old female presenting 1 week postpartum with a history of preeclampsia presenting with pounding headache that started last night.  Notes gradual onset.  Denies changes in vision.  Denies nausea, vomiting, neck pain, numbness, weakness, seizures.  Reports she was diagnosed with preeclampsia during her recent pregnancies.  Takes Procardia though reports some noncompliance over this last week.  Denies history of eclampsia.  Notes mild lochia, improving and baseline with prior pregnancies.      Review of patient's allergies indicates:   Allergen Reactions    Dog dander     House dust     Cat/feline products      Past Medical History:   Diagnosis Date    Asthma     Hemoglobin S trait     Herpes     Hypertension      Past Surgical History:   Procedure Laterality Date    ANTERIOR CRUCIATE LIGAMENT REPAIR       SECTION       SECTION N/A 10/22/2021    Procedure:  SECTION;  Surgeon: Julie R. Jeansonne, MD;  Location: [facility] L&D;  Service: OB/GYN;  Laterality: N/A;     SECTION N/A 10/22/2021    Procedure:  SECTION;  Surgeon: Sissy Jean MD;  Location: [facility]&D;  Service: OB/GYN;  Laterality: N/A;     SECTION N/A 2/3/2023    Procedure:  SECTION;  Surgeon: Avila Herman MD;  Location: [facility] L&D OR;  Service: OB/GYN;  Laterality: N/A;     SECTION N/A 2024    Procedure:  SECTION;  Surgeon: Avila Herman MD;  Location: [facility] L&D OR;  Service: OB/GYN;  Laterality: N/A;    DILATION AND CURETTAGE OF UTERUS N/A     DILATION AND  CURETTAGE OF UTERUS USING SUCTION N/A      Family History   Adopted: Yes   Problem Relation Name Age of Onset    Miscarriages / Stillbirths Neg Hx      Cancer Neg Hx       Social History     Tobacco Use    Smoking status: Never    Smokeless tobacco: Never   Substance Use Topics    Alcohol use: Not Currently     Comment: occ    Drug use: Yes     Types: Marijuana     Review of Systems   Neurological:  Positive for headaches.       Physical Exam     Initial Vitals [08/23/24 0859]   BP Pulse Resp Temp SpO2   (!) 152/84 89 20 98.1 °F (36.7 °C) 97 %      MAP       --         Physical Exam    Nursing note and vitals reviewed.  Constitutional: She appears well-developed and well-nourished. She is not diaphoretic. No distress.   HENT:   Head: Normocephalic and atraumatic.   Nose: Nose normal.   Eyes: EOM are normal. Pupils are equal, round, and reactive to light. No scleral icterus.   Neck: Neck supple.   Normal range of motion.  Cardiovascular:  Normal rate, regular rhythm, normal heart sounds and intact distal pulses.     Exam reveals no gallop and no friction rub.       No murmur heard.  Pulmonary/Chest: Breath sounds normal. No stridor. No respiratory distress. She has no wheezes. She has no rhonchi. She has no rales.   Abdominal: Abdomen is soft. Bowel sounds are normal. She exhibits no distension. There is no abdominal tenderness. There is no rebound and no guarding.   Musculoskeletal:         General: No tenderness or edema. Normal range of motion.      Cervical back: Normal range of motion and neck supple.     Neurological: She is alert and oriented to person, place, and time. No cranial nerve deficit. GCS score is 15. GCS eye subscore is 4. GCS verbal subscore is 5. GCS motor subscore is 6.   Skin: Skin is warm and dry. No rash noted.   Psychiatric: She has a normal mood and affect. Her behavior is normal.         ED Course   Procedures  Labs Reviewed - No data to display         Imaging Results    None           Medications   acetaminophen tablet 1,000 mg (1,000 mg Oral Given 8/23/24 1100)     Medical Decision Making  Amount and/or Complexity of Data Reviewed  Labs: ordered.               ED Course as of 08/23/24 1658   Fri Aug 23, 2024   0952 Discussed case with Dr. Gipson who advised to not start magnesium and send the patient upstairs to labor and delivery for further evaluation. [GS]      ED Course User Index  [GS] Stas Milan MD               Medical Decision Making:   Initial Assessment:   34-year-old female presenting with pounding headache, hypertension.  Patient noted with 2 blood pressure readings in the emergency department both above 150.  Denies changes in vision.  Mild hyperreflexia noted.  History of preeclampsia being treated with antihypertensives at home.  Patient otherwise without complaints and with normal postpartum course.  No numbness weakness or changes in vision.  No history of seizures.  Discussed case with Dr. Gipson.  Will send to labor and delivery.  Dr. Gipson has advised against starting magnesium at this time.             Clinical Impression:  Final diagnoses:  [I10] Hypertension, unspecified type (Primary)  [R51.9] Nonintractable headache, unspecified chronicity pattern, unspecified headache type  [O14.94] Hypertension in pregnancy, preeclampsia, delivered          ED Disposition Condition    Send to L&D Stable                Stas Milan MD  08/23/24 0375

## 2024-08-23 NOTE — TELEPHONE ENCOUNTER
Bp 139/95. Headache since yesterday afternoon. It started as a dull headache and has progressively gotten worse. Headache is more on the right side. Care advice recommend pt go to Er. Pt verbalized understanding.   Reason for Disposition   Systolic BP  >= 140 OR Diastolic BP >= 90    Additional Information   Negative: Difficult to awaken or acting confused (e.g., disoriented, slurred speech)   Negative: [1] Weakness of the face, arm or leg on one side of the body AND [2] new-onset   Negative: [1] Numbness of the face, arm or leg on one side of the body AND [2] new-onset   Negative: [1] Loss of speech or garbled speech AND [2] new-onset   Negative: Sounds like a life-threatening emergency to the triager   Negative: Unable to walk, or can only walk with assistance (e.g., requires support)   Negative: Stiff neck (can't touch chin to chest)   Negative: Severe pain in one eye   Negative: [1] Other family members (or people in same household) with headaches AND [2] possibility of carbon monoxide exposure    Protocols used: Postpartum - Headache-A-AH

## 2024-08-23 NOTE — DISCHARGE INSTRUCTIONS
After a Cesearean Birth    General Discharge Instructions  May follow a regular diet, unless otherwise discussed with physician.  Take showers, not baths until instructed by your doctor.   For the next 5 days, continue to use Hibiclens solution when you shower. Always use a clean towel when drying incision.  Incision dressing should be removed 7 days after surgery. If dressing begins to peel up prior to this day, gently remove.    Activity as tolerated.  No lifting or heavy exercise for 6 weeks, no driving for 2 weeks, no sexual intercourse, douching or tampons for 6 weeks  May return to work/school as discussed with physician  Discuss birth control with physician  Breast care support bra worn at all times  Lactation consultant referral number ( [phone] or [phone])      Call Your Healthcare Provider Right Away If You Have:  A temperature of 100.4°F or higher.  If your blood pressure is over 140/90.  You have difficulty catching your breath or trouble breathing.  Heavy vaginal bleeding, clots, or vaginal discharge with foul odor. (heavier than menses)  Persistent nausea or vomiting.  You gain more than 3 pounds in 3 days.  Severe headaches not relieved by Tylenol (acetaminophen) or Motrin (ibuprofen)  Blurry or double vision, see spots or flashing lights.  Dizziness or fainting.  New onset swelling or worsening of existing swelling.  Burning or pain when you urinate.  No bowel movement for 5 days.  Redness, warmth, swelling, or pain in the lower leg.  Redness, discharge, or pain worse than you had in the hospital.  Burning, pain, red streaks, or lumpy areas in your breasts.  Cracks, blisters, or blood on your nipples.  Feelings of extreme sadness or anxiety, or a feeling that you dont want to be with your baby.  If you have any new or unusual symptoms or have questions or concerns    Incision Care  You will be able to shower and pat the incision dry.  Watch your incision for signs of infection, such as  increasing redness or drainage.  For ease of movement, hold a pillow against the incision when you get up from a lying or sitting position, and when you laugh or cough.  Avoid heavy lifting--nothing heavier than your baby until your doctor instructs you otherwise.     Follow-Up  Schedule a  follow-up exam with your healthcare provider for about 6 weeks after delivery. During this exam, your uterus and vaginal area will be checked. Contact your healthcare provider if you think you or your baby are having any problems.

## 2024-08-26 ENCOUNTER — POSTPARTUM VISIT (OUTPATIENT)
Dept: OBSTETRICS AND GYNECOLOGY | Facility: CLINIC | Age: 34
End: 2024-08-26
Payer: MEDICAID

## 2024-08-26 ENCOUNTER — TELEPHONE (OUTPATIENT)
Dept: OBSTETRICS AND GYNECOLOGY | Facility: CLINIC | Age: 34
End: 2024-08-26
Payer: MEDICAID

## 2024-08-26 VITALS
DIASTOLIC BLOOD PRESSURE: 80 MMHG | WEIGHT: 265.19 LBS | BODY MASS INDEX: 40.33 KG/M2 | SYSTOLIC BLOOD PRESSURE: 140 MMHG

## 2024-08-26 DIAGNOSIS — Z09 POSTOP CHECK: Primary | ICD-10-CM

## 2024-08-26 LAB
OHS QRS DURATION: 80 MS
OHS QTC CALCULATION: 459 MS

## 2024-08-26 PROCEDURE — 99999 PR PBB SHADOW E&M-EST. PATIENT-LVL III: CPT | Mod: PBBFAC,,, | Performed by: OBSTETRICS & GYNECOLOGY

## 2024-08-26 PROCEDURE — 99213 OFFICE O/P EST LOW 20 MIN: CPT | Mod: PBBFAC | Performed by: OBSTETRICS & GYNECOLOGY

## 2024-08-26 PROCEDURE — 99499 UNLISTED E&M SERVICE: CPT | Mod: S$PBB,,, | Performed by: OBSTETRICS & GYNECOLOGY

## 2024-08-26 RX ORDER — IBUPROFEN 600 MG/1
600 TABLET ORAL EVERY 6 HOURS PRN
Qty: 30 TABLET | Refills: 0 | Status: SHIPPED | OUTPATIENT
Start: 2024-08-26

## 2024-08-27 NOTE — PROGRESS NOTES
Cc:  postop check    HPI:  Pt had RLTCS on 8/16/24 by Dr. Petersen.  Pt here for wound check.  Pt also requesting refill of Motrin.    Vitals:    08/26/24 1142   BP: (!) 140/80   Weight: 120.3 kg (265 lb 3.4 oz)     Abd:  soft NTND  Wound:  aquacel dressing removed, wound appears to be healing well    Assessment/Plan  1. Postop check          F/u w/ Dr. Petersen in 2-3 weeks

## 2024-09-06 ENCOUNTER — PATIENT MESSAGE (OUTPATIENT)
Dept: MATERNAL FETAL MEDICINE | Facility: CLINIC | Age: 34
End: 2024-09-06
Payer: MEDICAID

## 2024-09-10 ENCOUNTER — POSTPARTUM VISIT (OUTPATIENT)
Dept: OBSTETRICS AND GYNECOLOGY | Facility: CLINIC | Age: 34
End: 2024-09-10
Payer: MEDICAID

## 2024-09-10 VITALS — BODY MASS INDEX: 38.77 KG/M2 | WEIGHT: 255 LBS | SYSTOLIC BLOOD PRESSURE: 130 MMHG | DIASTOLIC BLOOD PRESSURE: 70 MMHG

## 2024-09-10 DIAGNOSIS — Z51.89 VISIT FOR WOUND CHECK: ICD-10-CM

## 2024-09-10 PROCEDURE — 99999 PR PBB SHADOW E&M-EST. PATIENT-LVL III: CPT | Mod: PBBFAC,,, | Performed by: OBSTETRICS & GYNECOLOGY

## 2024-09-10 PROCEDURE — 99213 OFFICE O/P EST LOW 20 MIN: CPT | Mod: PBBFAC | Performed by: OBSTETRICS & GYNECOLOGY

## 2024-09-10 NOTE — PROGRESS NOTES
History & Physical  Gynecology Problem Visit      SUBJECTIVE:     Chief Complaint: Postpartum Care       History of Present Illness:  Ole Allen is a 34 y.o. female  presents for a postpartum visit.  She is status post RLTCS 4 weeks ago.  Her hospitalization was not complicated.  She is breastfeeding.  She desires family planning for contraception.  She denies postpartum depression.    Her last pap was NILM 2024.    Review of patient's allergies indicates:   Allergen Reactions    Dog dander     House dust     Cat/feline products        Past Medical History:   Diagnosis Date    Asthma     Hemoglobin S trait     Herpes     Hypertension      Past Surgical History:   Procedure Laterality Date    ANTERIOR CRUCIATE LIGAMENT REPAIR       SECTION       SECTION N/A 10/22/2021    Procedure:  SECTION;  Surgeon: Julie R. Jeansonne, MD;  [facility]: [facility];  Service: OB/GYN;  Laterality: N/A;     SECTION N/A 10/22/2021    Procedure:  SECTION;  Surgeon: Sissy Jean MD;  Location: [facility] L&D;  Service: OB/GYN;  Laterality: N/A;     SECTION N/A 2/3/2023    Procedure:  SECTION;  Surgeon: Avila Herman MD;  Location: [facility] L&D OR;  Service: OB/GYN;  Laterality: N/A;     SECTION N/A 2024    Procedure:  SECTION;  Surgeon: Avila Herman MD;  Location: [facility] L&D OR;  Service: OB/GYN;  Laterality: N/A;    DILATION AND CURETTAGE OF UTERUS N/A     DILATION AND CURETTAGE OF UTERUS USING SUCTION N/A      OB History          6    Para   4    Term   4            AB   2    Living   4         SAB        IAB   2    Ectopic        Multiple   0    Live Births   4           Obstetric Comments   Gynhx: reg  H/o genital herpes, chlamydia               Family History   Adopted: Yes   Problem Relation Name Age of Onset    Miscarriages / Stillbirths Neg Hx      Cancer Neg Hx       Social History     Tobacco Use     Smoking status: Never    Smokeless tobacco: Never   Substance Use Topics    Alcohol use: Not Currently     Comment: occ    Drug use: Yes     Types: Marijuana       Current Outpatient Medications   Medication Sig    albuterol (PROVENTIL) 2.5 mg /3 mL (0.083 %) nebulizer solution Take 3 mLs (2.5 mg total) by nebulization every 8 (eight) hours as needed for Wheezing or Shortness of Breath. Rescue    albuterol (PROVENTIL/VENTOLIN HFA) 90 mcg/actuation inhaler Inhale 2 puffs into the lungs every 6 (six) hours as needed for Wheezing. Rescue    docusate sodium (COLACE) 100 MG capsule Take 1 capsule (100 mg total) by mouth 2 (two) times daily.    ferrous sulfate 325 (65 FE) MG EC tablet Take 1 tablet (325 mg total) by mouth once daily.    HYDROcodone-acetaminophen (NORCO) 5-325 mg per tablet Take 1 tablet by mouth every 6 (six) hours as needed for Pain.    ibuprofen (ADVIL,MOTRIN) 600 MG tablet Take 1 tablet (600 mg total) by mouth every 6 (six) hours as needed for Pain.    NIFEdipine (PROCARDIA-XL) 30 MG (OSM) 24 hr tablet Take 1 tablet (30 mg total) by mouth once daily.    PNV,calcium 72-iron-folic acid (PRENATAL VITAMIN PLUS LOW IRON) 27 mg iron- 1 mg Tab Take 1 tablet (1 each total) by mouth once daily.    valACYclovir (VALTREX) 500 MG tablet Take 1 tablet (500 mg total) by mouth 2 (two) times daily.     No current facility-administered medications for this visit.         Review of Systems:  Review of Systems   Constitutional:  Negative for chills and fever.   HENT:  Negative for congestion.    Eyes:  Negative for visual disturbance.   Respiratory:  Negative for cough and shortness of breath.    Cardiovascular:  Negative for chest pain.   Gastrointestinal:  Negative for abdominal pain, nausea and vomiting.   Genitourinary:  Negative for dysuria, hematuria, vaginal bleeding and vaginal discharge.   Skin:  Negative for rash.   Neurological:  Negative for headaches.   Hematological:  Does not bruise/bleed easily.       "  OBJECTIVE:   Vitals:     Vitals:    09/10/24 1101   BP: 130/70   Weight: 115.7 kg (255 lb)        Physical Exam:  Physical Exam  Vitals and nursing note reviewed.   Constitutional:       Appearance: She is well-developed.   Cardiovascular:      Rate and Rhythm: Normal rate.   Pulmonary:      Effort: Pulmonary effort is normal. No respiratory distress.   Chest:   Breasts:     Breasts are symmetrical.   Abdominal:      General: There is no distension.      Palpations: Abdomen is soft.      Tenderness: There is no abdominal tenderness.   Skin:     General: Skin is warm and dry.   Neurological:      Mental Status: She is alert and oriented to person, place, and time.       ASSESSMENT:       ICD-10-CM ICD-9-CM    1. Postpartum care and examination of lactating mother  Z39.1 V24.1       2. Visit for wound check  Z51.89 V58.89         Plan:      Ole Saavedra "Ole" was seen today for postpartum care.    Diagnoses and all orders for this visit:    Postpartum care and examination of lactating mother  - Breast feeding  - No menses at this time  - Doing well  - Denies postpartum depression, denies SI/HI  - Declines for birth control. Discussed family planning in detail with patient      Visit for wound check  -  Wounding healing well      No orders of the defined types were placed in this encounter.      Follow up in about 4 weeks (around 10/8/2024) for postpartum visit.      "

## (undated) DEVICE — DRESSING AQUACEL AG 3.5X10IN

## (undated) DEVICE — PAD ABD 8X10 STERILE